# Patient Record
Sex: FEMALE | Race: WHITE | NOT HISPANIC OR LATINO | Employment: FULL TIME | URBAN - METROPOLITAN AREA
[De-identification: names, ages, dates, MRNs, and addresses within clinical notes are randomized per-mention and may not be internally consistent; named-entity substitution may affect disease eponyms.]

---

## 2017-01-10 RX ORDER — ESTRADIOL 0.1 MG/G
CREAM VAGINAL
Qty: 42.5 G | Refills: 0 | Status: ON HOLD | OUTPATIENT
Start: 2017-01-10 | End: 2018-06-11 | Stop reason: HOSPADM

## 2017-02-20 ENCOUNTER — HOSPITAL ENCOUNTER (OUTPATIENT)
Dept: RADIOLOGY | Facility: HOSPITAL | Age: 57
Discharge: HOME | End: 2017-02-20
Attending: NEUROLOGICAL SURGERY
Payer: COMMERCIAL

## 2017-02-20 ENCOUNTER — PROCEDURE VISIT (OUTPATIENT)
Dept: NEUROLOGY | Facility: CLINIC | Age: 57
End: 2017-02-20
Payer: COMMERCIAL

## 2017-02-20 DIAGNOSIS — M40.30 FLAT BACK SYNDROME, POSTPROCEDURAL: ICD-10-CM

## 2017-02-20 DIAGNOSIS — K83.8 COMMON BILE DUCT DILATATION: ICD-10-CM

## 2017-02-20 PROCEDURE — 72082 X-RAY EXAM ENTIRE SPI 2/3 VW: CPT | Mod: 26,,, | Performed by: RADIOLOGY

## 2017-02-20 PROCEDURE — 72131 CT LUMBAR SPINE W/O DYE: CPT | Mod: TC

## 2017-02-20 PROCEDURE — 72114 X-RAY EXAM L-S SPINE BENDING: CPT | Mod: 26,,, | Performed by: RADIOLOGY

## 2017-02-20 PROCEDURE — 72082 X-RAY EXAM ENTIRE SPI 2/3 VW: CPT | Mod: TC

## 2017-02-20 PROCEDURE — 95908 NRV CNDJ TST 3-4 STUDIES: CPT | Mod: S$GLB,,,

## 2017-02-20 PROCEDURE — 73522 X-RAY EXAM HIPS BI 3-4 VIEWS: CPT | Mod: 26,,, | Performed by: RADIOLOGY

## 2017-02-20 PROCEDURE — 76705 ECHO EXAM OF ABDOMEN: CPT | Mod: 26,,, | Performed by: RADIOLOGY

## 2017-02-20 PROCEDURE — 95886 MUSC TEST DONE W/N TEST COMP: CPT | Mod: S$GLB,,,

## 2017-02-20 PROCEDURE — 76705 ECHO EXAM OF ABDOMEN: CPT | Mod: TC

## 2017-02-20 PROCEDURE — 72131 CT LUMBAR SPINE W/O DYE: CPT | Mod: 26,,, | Performed by: RADIOLOGY

## 2017-02-20 PROCEDURE — 72114 X-RAY EXAM L-S SPINE BENDING: CPT | Mod: TC

## 2017-02-20 PROCEDURE — 73522 X-RAY EXAM HIPS BI 3-4 VIEWS: CPT | Mod: TC,LT

## 2017-02-20 NOTE — PROCEDURES
Procedures     See the copy of this report that has been scanned into patient's Epic Chart.     JAVAN STATON III, MD

## 2017-02-21 ENCOUNTER — HOSPITAL ENCOUNTER (OUTPATIENT)
Dept: RADIOLOGY | Facility: HOSPITAL | Age: 57
Discharge: HOME | End: 2017-02-21
Attending: FAMILY MEDICINE
Payer: COMMERCIAL

## 2017-02-21 ENCOUNTER — OFFICE VISIT (OUTPATIENT)
Dept: FAMILY MEDICINE | Facility: CLINIC | Age: 57
End: 2017-02-21
Payer: COMMERCIAL

## 2017-02-21 VITALS
TEMPERATURE: 98 F | HEIGHT: 63 IN | BODY MASS INDEX: 35.66 KG/M2 | RESPIRATION RATE: 12 BRPM | WEIGHT: 201.25 LBS | HEART RATE: 85 BPM | SYSTOLIC BLOOD PRESSURE: 128 MMHG | OXYGEN SATURATION: 99 % | DIASTOLIC BLOOD PRESSURE: 70 MMHG

## 2017-02-21 DIAGNOSIS — R60.0 PEDAL EDEMA: ICD-10-CM

## 2017-02-21 DIAGNOSIS — M25.562 CHRONIC PAIN OF LEFT KNEE: ICD-10-CM

## 2017-02-21 DIAGNOSIS — G89.29 CHRONIC PAIN OF LEFT KNEE: ICD-10-CM

## 2017-02-21 DIAGNOSIS — R29.6 FALLS FREQUENTLY: Primary | ICD-10-CM

## 2017-02-21 PROCEDURE — 73562 X-RAY EXAM OF KNEE 3: CPT | Mod: 26,LT,, | Performed by: RADIOLOGY

## 2017-02-21 PROCEDURE — 99999 PR PBB SHADOW E&M-EST. PATIENT-LVL III: CPT | Mod: PBBFAC,,, | Performed by: FAMILY MEDICINE

## 2017-02-21 PROCEDURE — 99214 OFFICE O/P EST MOD 30 MIN: CPT | Mod: S$GLB,,, | Performed by: FAMILY MEDICINE

## 2017-02-21 PROCEDURE — 73560 X-RAY EXAM OF KNEE 1 OR 2: CPT | Mod: TC,59,PO,RT

## 2017-02-21 PROCEDURE — 73560 X-RAY EXAM OF KNEE 1 OR 2: CPT | Mod: 26,59,RT, | Performed by: RADIOLOGY

## 2017-02-21 RX ORDER — FUROSEMIDE 20 MG/1
20 TABLET ORAL DAILY
Qty: 30 TABLET | Refills: 0 | Status: SHIPPED | OUTPATIENT
Start: 2017-02-21 | End: 2018-09-26

## 2017-02-21 RX ORDER — GABAPENTIN 300 MG/1
CAPSULE ORAL
Refills: 4 | COMMUNITY
Start: 2017-02-08 | End: 2017-11-07 | Stop reason: SDUPTHER

## 2017-02-21 NOTE — MR AVS SNAPSHOT
[facility]  [address]  [place] [address]  Phone: [phone]  Fax: [phone]                  Chanelle Downey   2017 8:20 AM   Office Visit    Description:  Female : 1960   Provider:  Jamari Murguia MD   Department:  [facility]           Reason for Visit     Leg Swelling           Diagnoses this Visit        Comments    Falls frequently    -  Primary     Chronic pain of left knee         Pedal edema                To Do List           Future Appointments        Provider Department Dept Phone    2017 1:00 PM [facility] [phone]      Goals (5 Years of Data)     None       These Medications        Disp Refills Start End    furosemide (LASIX) 20 MG tablet 30 tablet 0 2017    Take 1 tablet (20 mg total) by mouth once daily. - Oral    Pharmacy: [id number] - [place], [place] - [address] AT [facility] Ph #: [phone]         [facility] On Call     [facility] On Call Nurse Care Line -  Assistance  Registered nurses in the [facility] On Call Center provide clinical advisement, health education, appointment booking, and other advisory services.  Call for this free service at [phone].             Medications           Message regarding Medications     Verify the changes and/or additions to your medication regime listed below are the same as discussed with your clinician today.  If any of these changes or additions are incorrect, please notify your healthcare provider.        START taking these NEW medications        Refills    furosemide (LASIX) 20 MG tablet 0    Sig: Take 1 tablet (20 mg total) by mouth once daily.    Class: Normal    Route: Oral           Verify that the below list of medications is an accurate representation of the medications you are currently taking.  If none reported, the list may be blank. If incorrect,  "please contact your healthcare provider. Carry this list with you in case of emergency.           Current Medications     (Magic mouthwash) 1:1:1 Benadryl 12.5mg/5ml liq, aluminum & magnesium hydroxide-simehticone (Maalox), lidocaine viscous 2% Swish and spit 5 mLs every 4 (four) hours as needed. for mouth sores    duloxetine (CYMBALTA) 30 MG capsule Take 3 capsules (90 mg total) by mouth once daily.    ESTRACE 0.01 % (0.1 mg/gram) vaginal cream PLACE 1 GRAM VAGINALLY EVERY DAY    gabapentin (NEURONTIN) 300 MG capsule TK 1 C PO TID    methadone (DOLOPHINE) 5 MG tablet Take 125 mg by mouth once daily.     omeprazole (PRILOSEC) 40 MG capsule TAKE ONE CAPSULE BY MOUTH ONCE DAILY    triamcinolone acetonide 0.1% (KENALOG) 0.1 % ointment Apply topically 2 (two) times daily as needed.    furosemide (LASIX) 20 MG tablet Take 1 tablet (20 mg total) by mouth once daily.           Clinical Reference Information           Your Vitals Were     BP Pulse Temp Resp Height Weight    128/70 (BP Location: Left arm, Patient Position: Sitting, BP Method: Manual) 85 98 °F (36.7 °C) (Oral) 12 5' 3" (1.6 m) 91.3 kg (201 lb 4.5 oz)    SpO2 BMI             99% 35.66 kg/m2         Blood Pressure          Most Recent Value    BP  128/70      Allergies as of 2/21/2017     No Known Allergies      Immunizations Administered on Date of Encounter - 2/21/2017     None      Orders Placed During Today's Visit      Normal Orders This Visit    Ambulatory referral to Orthopedics     CANE FOR HOME USE       Smoking Cessation     If you would like to quit smoking:   You may be eligible for free services if you are a [place] resident and started smoking cigarettes before September 1, 1988.  Call the Smoking Cessation [employer] (SCT) toll free at (107) 686-8814 or (186) 489-0235.   Call 9-865-QUIT-NOW if you do not meet the above criteria.            Language Assistance Services     ATTENTION: Language assistance services are available, free of charge. " Please call [phone].      ATENCIÓN: Si habla español, tiene a means disposición servicios gratuitos de asistencia lingüística. Llame al [phone].     CHÚ Ý: N?u b?n nói Ti?ng Vi?t, có các d?ch v? h? tr? ngôn ng? mi?n phí dành cho b?n. G?i s? [phone].         [facility] complies with applicable Federal civil rights laws and does not discriminate on the basis of race, color, national origin, age, disability, or sex.

## 2017-02-21 NOTE — PROGRESS NOTES
Subjective:       Patient ID: Chanelle Downey is a 57 y.o. female.    Chief Complaint: Leg Swelling (patient also fell yesterday , left knee pain now)    HPI    Fall - pts R ankle gave out yesterday and caused her to fall and hurt her L knee, which is the knee she has chronic pain in and limited ROM. She had her cane at the time of her fall, but it did not stabilize her enough.     Pedal edema - onset one week ago and worsened to today. Worsens when she is up on her feet, at the end of the day, or when she sleeps in her recliner with her feet hanging low.        Current Outpatient Prescriptions on File Prior to Visit   Medication Sig Dispense Refill    (Magic mouthwash) 1:1:1 Benadryl 12.5mg/5ml liq, aluminum & magnesium hydroxide-simehticone (Maalox), lidocaine viscous 2% Swish and spit 5 mLs every 4 (four) hours as needed. for mouth sores 1 Bottle 1    duloxetine (CYMBALTA) 30 MG capsule Take 3 capsules (90 mg total) by mouth once daily. 90 capsule 5    ESTRACE 0.01 % (0.1 mg/gram) vaginal cream PLACE 1 GRAM VAGINALLY EVERY DAY 42.5 g 0    methadone (DOLOPHINE) 5 MG tablet Take 125 mg by mouth once daily.       omeprazole (PRILOSEC) 40 MG capsule TAKE ONE CAPSULE BY MOUTH ONCE DAILY 90 capsule 0    triamcinolone acetonide 0.1% (KENALOG) 0.1 % ointment Apply topically 2 (two) times daily as needed. 30 g 1     No current facility-administered medications on file prior to visit.        Review of Systems   Constitutional: Negative for chills and fever.   Skin: Negative for rash and wound.       Objective:     Vitals:    02/21/17 0836   BP: 128/70   Pulse: 85   Resp: 12   Temp: 98 °F (36.7 °C)        Physical Exam   Constitutional: She appears well-developed. No distress.   HENT:   Head: Normocephalic and atraumatic.   Eyes: Conjunctivae are normal. No scleral icterus.   Pulmonary/Chest: Effort normal.   Musculoskeletal: She exhibits edema and tenderness.   +1 pedal edema up to the knee bilaterally. Peripheral  pulses LLE intact (pt dp).    Neurological: She is alert.   Skin: She is not diaphoretic.   Psychiatric: She has a normal mood and affect. Her behavior is normal.   Vitals reviewed.      Assessment:       1. Falls frequently    2. Chronic pain of left knee    3. Pedal edema        Plan:       Chanelle was seen today for leg swelling.    Diagnoses and all orders for this visit:    Falls frequently  -     CANE FOR HOME USE  Pt was offered a rolling walker for stability which she refused, but she did agree to a quad cane.   Pt has debility from her low back pain, but also her chronic L knee pain    Chronic pain of left knee  -     Ambulatory referral to Orthopedics  -     X-ray Knee Ortho Left; Future  Pt has debility from her low back pain, but also her chronic L knee pain - xray and ortho eval. Pt only had 5 days of relief from steroid shot.      Pedal edema   -     furosemide (LASIX) 20 MG tablet; Take 1 tablet (20 mg total) by mouth once daily.    One time rx to be used in conjunction with moderate strength compression stockings.             Return if symptoms worsen or fail to improve.        Pt verbalized understanding and agreed with our plan.

## 2017-02-22 ENCOUNTER — TELEPHONE (OUTPATIENT)
Dept: FAMILY MEDICINE | Facility: CLINIC | Age: 57
End: 2017-02-22

## 2017-02-22 NOTE — TELEPHONE ENCOUNTER
Patient has an appointment 3/23/17 1pm with Dr. [facility] at the [facility], patient was notified.

## 2017-03-10 DIAGNOSIS — K21.9 GASTROESOPHAGEAL REFLUX DISEASE, ESOPHAGITIS PRESENCE NOT SPECIFIED: ICD-10-CM

## 2017-03-10 RX ORDER — OMEPRAZOLE 40 MG/1
CAPSULE, DELAYED RELEASE ORAL
Qty: 90 CAPSULE | Refills: 3 | Status: SHIPPED | OUTPATIENT
Start: 2017-03-10 | End: 2018-04-22 | Stop reason: SDUPTHER

## 2017-04-21 RX ORDER — ESTRADIOL 0.1 MG/G
CREAM VAGINAL
Qty: 42.5 G | Refills: 0 | Status: ON HOLD | OUTPATIENT
Start: 2017-04-21 | End: 2018-06-11 | Stop reason: HOSPADM

## 2017-11-02 DIAGNOSIS — F32.A CHRONIC DEPRESSION: ICD-10-CM

## 2017-11-02 RX ORDER — DULOXETIN HYDROCHLORIDE 30 MG/1
90 CAPSULE, DELAYED RELEASE ORAL DAILY
Qty: 90 CAPSULE | Refills: 3 | Status: SHIPPED | OUTPATIENT
Start: 2017-11-02 | End: 2018-04-16 | Stop reason: SDUPTHER

## 2017-11-02 NOTE — TELEPHONE ENCOUNTER
----- Message from Ivonne Oropeza sent at 11/2/2017  8:20 AM CDT -----  Contact: Pt  Pt needs a refill on duloxetine (CYMBALTA) 30 MG capsule [facility] at [phone].      Pt is currently out of her medication and she's not scheduled until tomorrow with her psychiatrist.  She's requesting a one day supply.    Please call pt at [phone].        Thanks!

## 2017-11-07 DIAGNOSIS — G62.9 NEUROPATHY: Primary | ICD-10-CM

## 2017-11-07 RX ORDER — GABAPENTIN 300 MG/1
300 CAPSULE ORAL 3 TIMES DAILY PRN
Qty: 270 CAPSULE | Refills: 1 | Status: SHIPPED | OUTPATIENT
Start: 2017-11-07 | End: 2018-09-26

## 2017-11-07 NOTE — TELEPHONE ENCOUNTER
----- Message from Cynthia Graham sent at 11/7/2017  3:49 PM CST -----  Contact: self  Pt needs a refill on her gabapentin. She uses [facility].      Pt can be reached at [phone]

## 2018-01-17 ENCOUNTER — OFFICE VISIT (OUTPATIENT)
Dept: NEUROLOGY | Facility: CLINIC | Age: 58
End: 2018-01-17
Payer: COMMERCIAL

## 2018-01-17 VITALS
BODY MASS INDEX: 39.3 KG/M2 | WEIGHT: 200.19 LBS | HEIGHT: 60 IN | HEART RATE: 90 BPM | DIASTOLIC BLOOD PRESSURE: 83 MMHG | SYSTOLIC BLOOD PRESSURE: 130 MMHG

## 2018-01-17 DIAGNOSIS — M54.12 CERVICAL RADICULOPATHY: ICD-10-CM

## 2018-01-17 DIAGNOSIS — M40.30 FLAT BACK SYNDROME, POSTPROCEDURAL: Primary | ICD-10-CM

## 2018-01-17 PROCEDURE — 99999 PR PBB SHADOW E&M-EST. PATIENT-LVL III: CPT | Mod: PBBFAC,,, | Performed by: STUDENT IN AN ORGANIZED HEALTH CARE EDUCATION/TRAINING PROGRAM

## 2018-01-17 PROCEDURE — 3008F BODY MASS INDEX DOCD: CPT | Mod: S$GLB,,, | Performed by: STUDENT IN AN ORGANIZED HEALTH CARE EDUCATION/TRAINING PROGRAM

## 2018-01-17 PROCEDURE — 99203 OFFICE O/P NEW LOW 30 MIN: CPT | Mod: S$GLB,,, | Performed by: STUDENT IN AN ORGANIZED HEALTH CARE EDUCATION/TRAINING PROGRAM

## 2018-01-17 RX ORDER — IBUPROFEN 800 MG/1
800 TABLET ORAL
COMMUNITY
End: 2023-02-27 | Stop reason: ALTCHOICE

## 2018-01-17 NOTE — ASSESSMENT & PLAN NOTE
-Plan for PT, exercise program  -Referral to Neurosurgery, will ask about any imaging they would like prior to appt  -Continue current pain control regimen--gabapentin, duloxetine, methadone

## 2018-01-17 NOTE — PATIENT INSTRUCTIONS
CMP to check BUN, Cr.  Referral to PT.  Referral to [facility], addended for aquatherapy if possible.  Referral to NSGY, previous seen by Dr. Caballero.  Will message to clarify need for further imaging prior to appt.

## 2018-01-17 NOTE — PROGRESS NOTES
NEUROLOGY OUTPATIENT CLINIC NOTE    Patient Name:  Chanelle Downey  Patient MRN:  [id number]    HPI:  Patient is a 57 y.o. female with PMHx of GERD and chronic LBP s/p lumbar discectomy 2002 and L4-S1 laminectomy and fusion 2010 with post-procedural flat back syndrome who presents to [facility] 1/17/2018 for concern of progression of BLE symptoms and wondering whether she needs surgical intervention yet.  Patient had seen Dr. Caballero 10/2016 and was under the impression that we were affiliated with his clinic due to confusion with Dr. Yap, who checked EMG on this patient 02/20/17.  Patient presents today with progression of BLE weakness and impaired gait over past ~ 10 years with increased falls over past 5 years.  Notes ~ 10 falls within the past year alone. She is unable to stand straight and walks with a cane, requiring a 45 degree front bend at the waist at all times while walking.  Cites numbness/tingling down entire LLE with associated L foot deformity.  Pain in R lower back with radiation to RLE.  Additional joint pain, possibly due to gait abnormalities, in R hip and L knee.  Occasional urge incontinence but no significant bowel/bladder incontinence or saddle anesthesia.  Has noticed some intermittent numbness/tingling in BUE developing over past year or so, R worse than L.  She still has full strength in BUE.  Has tried PT/OT intermittently over past 10+ years, but notes no recent therapy.  Very interested in working on losing weight and trying therapy.  She is also getting in with smoking cessation clinic next week.  Her pain is relatively well-controlled on gabapentin 300 mg qd (works during the night and takes when she gets home), duloxetine 90 mg po qd, methadone 110 mg po qd, and ibuprofen 800 mg po qd.  Occasionally takes up to 1600 mg ibuprofen daily, counseled on risk to stomach lining and kidneys.      ROS:  General:  No fever, no chills, no fatigue, +weight gain (20+lbs over several  years)  HEENT:  No headache, no changes in vision  Respiratory:  No cough, no SOB  Cardiovascular:  No chest pain, + palpitations--saw PCP for this, no issues  GI:  No abdominal pain, no n/v/c/d  :  No dysuria, no change in frequency, +increased urgency, +stress incontinence  Skin:  No rashes, no wounds  Musculoskeletal:  +LBP, +R hip pain, +L knee pain  Hematologic:  No easy bruising or bleeding  Neuro:  No tremors, +BLE weakness, + LLE numbness, +intermittent BUE numbness/tingling  Psych:  No anxiety, + depression--seen by Psych, stable    PMHx:  Patient Active Problem List   Diagnosis    Right arm weakness    Degenerative cervical spinal stenosis    Cigarette nicotine dependence without complication    Chronic low back pain    History of back surgery    Methadone dependence    Cervical stenosis of spinal canal    Cervical radiculopathy    Chronic pain of left knee    HSV (herpes simplex virus) anogenital infection     PSHx:  Past Surgical History:   Procedure Laterality Date    APPENDECTOMY       SECTION      EYE SURGERY      For strabismus    HYSTERECTOMY      LUMBAR DISCECTOMY      Unknown vertebra    LUMBAR FUSION      L4-S1 fusion reported    TONSILLECTOMY       Medications:  Current Outpatient Prescriptions on File Prior to Visit   Medication Sig Dispense Refill    DULoxetine (CYMBALTA) 30 MG capsule Take 3 capsules (90 mg total) by mouth once daily. 90 capsule 3    gabapentin (NEURONTIN) 300 MG capsule Take 1 capsule (300 mg total) by mouth 3 (three) times daily as needed. 270 capsule 1    methadone (DOLOPHINE) 5 MG tablet Take 125 mg by mouth once daily.       omeprazole (PRILOSEC) 40 MG capsule TAKE 1 CAPSULE BY MOUTH EVERY DAY 90 capsule 3    ESTRACE 0.01 % (0.1 mg/gram) vaginal cream PLACE 1 GRAM VAGINALLY EVERY DAY 42.5 g 0    ESTRACE 0.01 % (0.1 mg/gram) vaginal cream PLACE 1 GRAM VAGINALLY EVERY DAY 42.5 g 0    furosemide (LASIX) 20 MG tablet Take 1  tablet (20 mg total) by mouth once daily. 30 tablet 0    triamcinolone acetonide 0.1% (KENALOG) 0.1 % ointment Apply topically 2 (two) times daily as needed. 30 g 1    [DISCONTINUED] (Magic mouthwash) 1:1:1 Benadryl 12.5mg/5ml liq, aluminum & magnesium hydroxide-simehticone (Maalox), lidocaine viscous 2% Swish and spit 5 mLs every 4 (four) hours as needed. for mouth sores 1 Bottle 1     No current facility-administered medications on file prior to visit.      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  Has been with this job long enough that employer is aware of physical disabilities and can accommodate patient somewhat.  Lives with her son who is schizophrenic and helps take care of him.  She is a current smoker ~ 1/2 ppd (now down to less than that most days)--has been trying to quit and has an appt with smoking cessation clinic this coming week.  Denies EtOH and illicit use.  Regular testing at [facility].  Patient has done well with controlling methodone use and has cut down to 110 mg per day.  She is trying to become more active and would like to lose weight with a form of exercise that would be safe for her.      Physical Exam:  /83   Pulse 90   Ht 5' (1.524 m)   Wt 90.8 kg (200 lb 2.8 oz)   BMI 39.09 kg/m²   General:  Well-developed, well-nourished, nad  HEENT:  NCAT, PERRL, EOMI, oropharygneal membranes non-erythematous/without exudate  Neck:  Supple, no palpable lad, normal ROM.  Negative Spurling's test bilaterally.  Respiratory:  Symmetric expansion, no increased wob  CVS:  1+ pitting BLE edema.  Peripheral pulses 2+ and symmetric--radial, dorsalis pedis.  GI:  Abd soft, non-distended  Skin:  No visible rashes or wounds  Psych:  Pleasant, cooperative with exam.  Speech and thought content appropriate.  Neurologic Exam:  Mental Status:  AAOx3.  Converses  easily.  Able to spell 'world' forward and backward.  Recent, remote recall 3/3.  Cranial Nerves:  PERRLA, EOMI.  Visual fields intact on moving fingers in all quadrants bilaterally.  Baseline strabismus, R eye affected.  Facial movement intact and symmetric.  Tongue protrudes midline, palate raises symmetrically.  Trapezius 5/5 bilaterally.  Motor:  Normal muscle bulk and tone.  Strength 5/5 in BUE on shoulder abduction, biceps/triceps,  strength.  Sensory:  Sensation intact to light touch at all extremities.  Vibratory sensation intact and symmetric at BUE, BLE digits--subjective decrease in vibratory sensation of LLE up to upper shin/knee.  Reflexes:  Reflexes 2+--biceps, brachioradialis.  Areflexic L patellar, brisk 2+ R patellar.  No ankle clonus.  Equivocal toe LLE, withdrawal with downgoing toe RLE.    Coordination:  No resting tremor noted, able to hold posture without tremor.  FTN, HTS, RAINA wnl--no ataxia, dysmetria, or dysdiadochokinesia.  HTS with L heel over R shin limited by LLE weakness.  Gait:  Patient walks bent ~ 45 degrees forward at the waist, antalgic gait otherwise where pt swings LLE with stiff RLE.  No imbalance, normal arm swing.    Labs:  Results: CBC:   Lab Results   Component Value Date/Time    WBC 9.5 04/25/2011 08:28 PM    RBC 3.98 (L) 04/25/2011 08:28 PM    HGB 12.9 04/25/2011 08:28 PM    HCT 37.5 04/25/2011 08:28 PM     04/25/2011 08:28 PM    MCV 94.4 04/25/2011 08:28 PM    MCH 32.4 (H) 04/25/2011 08:28 PM    MCHC 34.3 04/25/2011 08:28 PM     CMP:   Lab Results   Component Value Date/Time    GLU 82 03/18/2016 01:35 PM    CALCIUM 9.5 03/18/2016 01:35 PM    ALBUMIN 3.5 03/18/2016 01:35 PM    PROT 6.5 03/10/2016 10:27 AM     03/18/2016 01:35 PM    K 4.0 03/18/2016 01:35 PM    CO2 29 03/18/2016 01:35 PM     03/18/2016 01:35 PM    BUN 10 03/18/2016 01:35 PM    CREATININE 0.8 03/18/2016 01:35 PM    ALKPHOS 82 03/10/2016 10:27 AM    ALT 23 03/10/2016 10:27 AM    AST 27  03/10/2016 10:27 AM    BILITOT 0.3 03/10/2016 10:27 AM     18 CMP PENDING--will recheck BUN, Cr.    Imagin17 CT Lumbar Spine:  Postsurgical changes prior L4-5 laminectomy and posterior spinal fusion of L4-S1. Hardware appears intact and in satisfactory position.  Fusion of the anterior vertebral bodies at L3-4 with resultant focal kyphosis.  Advanced degenerative changes at L2-L3 resulting in moderate spinal canal stenosis.  Mild degenerative changes elsewhere as above.    10/14/16 MRI Lumbar Spine:  1.  Postsurgical changes.  Degenerative changes primarily at L2-L3 with moderate spinal canal stenosis.  2.  Common bile duct dilatation.  Consider further evaluation with abdominal ultrasound.    Additional Diagnotic Testin17 EMG  Summary/Interpretation (per Dr. Yap)--scanned into EMR:  No sensory potentials identified.  Motor potentials are low in amplitude and conduction velocities are slightly slow.  There is chronic denervation in the gastroc muscle.  CONCLUSION:    1.  Chronic S1 radiculopathy  2.  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  Absent sensory potentials may be artifactual however, due to increased soft tissue mass as noted.  3.  Decreased activation of several muscles may be volitional due to pain, or an upper motor neuron lesion.  4.  Paraspinous not sampled because of prior surgery.    ASSESSMENT/PLAN:  Patient is a 57 y.o. female with a PMHx of GERD and chronic LBP s/p lumbar discectomy  and L4-S1 laminectomy and fusion  who presents to [facility] 2018 due to concern for worsening gait, increased LLE weakness, pain in RLE.    Problem List Items Addressed This Visit        Neuro    Cervical radiculopathy    Overview     18--clinic visit, pt describes intermittent BUE numbness/tingling throughout entire arm/hand.  Negative Spurling's test bilaterally.         Current Assessment & Plan     -Plan for PT, exercise program  -Referral to  Neurosurgery, will ask about any imaging they would like prior to appt  -Continue current pain control regimen--gabapentin, duloxetine, methadone            Orthopedic    Flat back syndrome, postprocedural - Primary    Overview     Dx 10/2016 when seen in NSGY clinic.  Presents to Neurology clinic 01/17/18 for worsening gait, LBP, LLE weakness, and RLE neuropathic pain.         Current Assessment & Plan     -Referral to PT to assess gait and work on strength/balance  -Referral to [facility], aquatherapy if possible  -Referral to NSGY, will ask about further imaging they would like prior to seeing patient in clinic  -Continue current gabapentin, duloxetine, methadone.    -Counseled on NSAID overuse, risk of gabapentin toxicity in ENED[place]. Will recheck BUN, Cr.         Relevant Orders    Ambulatory consult to Physical Therapy    Comprehensive metabolic panel    Ambulatory consult to Neurosurgery        Aniya Hamilton MD  Pager:  [phone] [place] [address]  [phone]

## 2018-01-17 NOTE — ASSESSMENT & PLAN NOTE
-Referral to PT to assess gait and work on strength/balance  -[facility], aquatherapy if possible  -Referral to NSGY, will ask about further imaging they would like prior to seeing patient in clinic  -Continue current gabapentin, duloxetine, methadone.    -Counseled on NSAID overuse, risk of gabapentin toxicity in [place]. Will recheck BUN, Cr.

## 2018-01-23 ENCOUNTER — DOCUMENTATION ONLY (OUTPATIENT)
Dept: SMOKING CESSATION | Facility: CLINIC | Age: 58
End: 2018-01-23

## 2018-01-26 ENCOUNTER — OFFICE VISIT (OUTPATIENT)
Dept: FAMILY MEDICINE | Facility: CLINIC | Age: 58
End: 2018-01-26
Payer: COMMERCIAL

## 2018-01-26 VITALS
BODY MASS INDEX: 38.91 KG/M2 | HEIGHT: 60 IN | RESPIRATION RATE: 20 BRPM | WEIGHT: 198.19 LBS | SYSTOLIC BLOOD PRESSURE: 128 MMHG | OXYGEN SATURATION: 95 % | DIASTOLIC BLOOD PRESSURE: 80 MMHG | HEART RATE: 88 BPM | TEMPERATURE: 100 F

## 2018-01-26 DIAGNOSIS — J45.20 MILD INTERMITTENT REACTIVE AIRWAY DISEASE WITHOUT COMPLICATION: Primary | ICD-10-CM

## 2018-01-26 DIAGNOSIS — J06.9 ACUTE UPPER RESPIRATORY INFECTION: ICD-10-CM

## 2018-01-26 DIAGNOSIS — F17.210 CIGARETTE NICOTINE DEPENDENCE WITHOUT COMPLICATION: ICD-10-CM

## 2018-01-26 DIAGNOSIS — Z12.31 ENCOUNTER FOR SCREENING MAMMOGRAM FOR BREAST CANCER: ICD-10-CM

## 2018-01-26 PROCEDURE — 99999 PR PBB SHADOW E&M-EST. PATIENT-LVL III: CPT | Mod: PBBFAC,,, | Performed by: FAMILY MEDICINE

## 2018-01-26 PROCEDURE — 94640 AIRWAY INHALATION TREATMENT: CPT | Mod: S$GLB,,, | Performed by: FAMILY MEDICINE

## 2018-01-26 PROCEDURE — 99215 OFFICE O/P EST HI 40 MIN: CPT | Mod: S$GLB,,, | Performed by: FAMILY MEDICINE

## 2018-01-26 RX ORDER — IPRATROPIUM BROMIDE AND ALBUTEROL SULFATE 2.5; .5 MG/3ML; MG/3ML
3 SOLUTION RESPIRATORY (INHALATION)
Status: COMPLETED | OUTPATIENT
Start: 2018-01-26 | End: 2018-01-26

## 2018-01-26 RX ORDER — ALBUTEROL SULFATE 90 UG/1
2 AEROSOL, METERED RESPIRATORY (INHALATION) EVERY 4 HOURS PRN
Qty: 1 INHALER | Refills: 3 | Status: SHIPPED | OUTPATIENT
Start: 2018-01-26 | End: 2019-11-04

## 2018-01-26 RX ORDER — AZITHROMYCIN 250 MG/1
TABLET, FILM COATED ORAL
Qty: 6 TABLET | Refills: 0 | Status: ON HOLD | OUTPATIENT
Start: 2018-01-26 | End: 2018-06-11 | Stop reason: HOSPADM

## 2018-01-26 RX ADMIN — IPRATROPIUM BROMIDE AND ALBUTEROL SULFATE 3 ML: 2.5; .5 SOLUTION RESPIRATORY (INHALATION) at 11:01

## 2018-01-26 NOTE — PROGRESS NOTES
Subjective:       Patient ID: Chanelle Downey is a 57 y.o. female.    Chief Complaint: Generalized Body Aches (body ache, cough, sore throat, headache ; off and on 1 week worsen past 2 to 3 days )    HPI    Onset 2-3 days ago of chest congestion, cough, body aches, sore throat, fevers/chills that has worsened since the onset.  + SOB and ARELLANO    + sick contacts    Dayquil has helped somewhat.     No n/v    Current Outpatient Prescriptions on File Prior to Visit   Medication Sig Dispense Refill    DULoxetine (CYMBALTA) 30 MG capsule Take 3 capsules (90 mg total) by mouth once daily. 90 capsule 3    gabapentin (NEURONTIN) 300 MG capsule Take 1 capsule (300 mg total) by mouth 3 (three) times daily as needed. 270 capsule 1    ibuprofen (ADVIL,MOTRIN) 200 MG tablet Take 200 mg by mouth every 6 (six) hours as needed for Pain.      methadone (DOLOPHINE) 5 MG tablet Take 110 mg by mouth once daily.       omeprazole (PRILOSEC) 40 MG capsule TAKE 1 CAPSULE BY MOUTH EVERY DAY 90 capsule 3    ESTRACE 0.01 % (0.1 mg/gram) vaginal cream PLACE 1 GRAM VAGINALLY EVERY DAY 42.5 g 0    ESTRACE 0.01 % (0.1 mg/gram) vaginal cream PLACE 1 GRAM VAGINALLY EVERY DAY 42.5 g 0    furosemide (LASIX) 20 MG tablet Take 1 tablet (20 mg total) by mouth once daily. 30 tablet 0    [DISCONTINUED] triamcinolone acetonide 0.1% (KENALOG) 0.1 % ointment Apply topically 2 (two) times daily as needed. 30 g 1     No current facility-administered medications on file prior to visit.        Past Medical History:   Diagnosis Date    Back pain, chronic        Family History   Problem Relation Age of Onset    Cancer Mother      non hodgkins lymphoma    Alcohol abuse Mother     Arthritis Mother     Depression Mother     Hypertension Mother     Heart disease Mother     Mental illness Daughter     Birth defects Daughter         reports that she has quit smoking. Her smoking use included Cigarettes. She smoked 0.50 packs per day. She has never used  smokeless tobacco. She reports that she drinks alcohol. She reports that she does not use drugs.    Review of Systems  see hpi  Objective:     Vitals:    01/26/18 1107   BP: 128/80   Pulse: 88   Resp: 20   Temp: 99.7 °F (37.6 °C)        Physical Exam   Constitutional: She appears well-developed. No distress.   HENT:   Head: Normocephalic and atraumatic.   Eyes: Conjunctivae are normal. Right eye exhibits no discharge. Left eye exhibits no discharge. No scleral icterus.   Cardiovascular: Normal rate and regular rhythm.  Exam reveals no gallop and no friction rub.    No murmur heard.  Pulmonary/Chest: No respiratory distress. She has wheezes (throughout). She has no rales.   Neurological: She is alert.   Skin: She is not diaphoretic.   Psychiatric: She has a normal mood and affect.   Vitals reviewed.      Assessment:       1. Mild intermittent reactive airway disease without complication    2. Acute upper respiratory infection    3. Encounter for screening mammogram for breast cancer    4. Cigarette nicotine dependence without complication        Plan:       Chanelle was seen today for generalized body aches.    Diagnoses and all orders for this visit:    Mild intermittent reactive airway disease without complication  -     azithromycin (ZITHROMAX Z-DANNY) 250 MG tablet; Take 2 pills day 1, then 1 pill day 2-5.  -     albuterol 90 mcg/actuation inhaler; Inhale 2 puffs into the lungs every 4 (four) hours as needed for Wheezing.  -     albuterol-ipratropium 2.5mg-0.5mg/3mL nebulizer solution 3 mL; Take 3 mLs by nebulization one time.  Patient's physical exam suggests reactive airway disease exacerbation such as COPD. This likely was triggered by upper respiratory illness and is complicated by patient's smoking status.  High risk due to exam findings. Pt to report to ed if sxs worsen.     Acute upper respiratory infection  See above    Encounter for screening mammogram for breast cancer  -     Mammo Digital Screening Bilat with  CAD; Future    Cigarette nicotine dependence without complication  Advised smoking cessation - discussed our free clinic, risks of smoking, and common practices used to quit. Discussion - 5 minutes.             Follow-up in about 4 weeks (around 2/23/2018) for f/u RAD.        Pt verbalized understanding and agreed with our plan.

## 2018-01-26 NOTE — PROGRESS NOTES
Colonoscopy  Call when ready to schedule     Mammogram Pending orders ; pt will call when ready to schedule      Influenza Vaccine Decline

## 2018-02-10 NOTE — PROGRESS NOTES
I have reviewed the notes, assessments, and/or procedures performed by Dr. Hamilton, I concur with her documentation of Chanelle Downey.

## 2018-04-16 DIAGNOSIS — F32.A CHRONIC DEPRESSION: ICD-10-CM

## 2018-04-16 RX ORDER — DULOXETIN HYDROCHLORIDE 30 MG/1
CAPSULE, DELAYED RELEASE ORAL
Qty: 90 CAPSULE | Refills: 0 | Status: SHIPPED | OUTPATIENT
Start: 2018-04-16 | End: 2018-06-02 | Stop reason: SDUPTHER

## 2018-04-22 DIAGNOSIS — K21.9 GASTROESOPHAGEAL REFLUX DISEASE, ESOPHAGITIS PRESENCE NOT SPECIFIED: ICD-10-CM

## 2018-04-23 RX ORDER — OMEPRAZOLE 40 MG/1
CAPSULE, DELAYED RELEASE ORAL
Qty: 90 CAPSULE | Refills: 0 | Status: SHIPPED | OUTPATIENT
Start: 2018-04-23 | End: 2018-08-20 | Stop reason: SDUPTHER

## 2018-05-14 ENCOUNTER — OFFICE VISIT (OUTPATIENT)
Dept: NEUROSURGERY | Facility: CLINIC | Age: 58
End: 2018-05-14
Payer: COMMERCIAL

## 2018-05-14 ENCOUNTER — TELEPHONE (OUTPATIENT)
Dept: NEUROSURGERY | Facility: CLINIC | Age: 58
End: 2018-05-14

## 2018-05-14 ENCOUNTER — HOSPITAL ENCOUNTER (OUTPATIENT)
Dept: RADIOLOGY | Facility: HOSPITAL | Age: 58
Discharge: HOME | End: 2018-05-14
Attending: NEUROLOGICAL SURGERY
Payer: COMMERCIAL

## 2018-05-14 VITALS
WEIGHT: 197.19 LBS | DIASTOLIC BLOOD PRESSURE: 67 MMHG | SYSTOLIC BLOOD PRESSURE: 117 MMHG | HEART RATE: 82 BPM | BODY MASS INDEX: 38.51 KG/M2 | TEMPERATURE: 98 F

## 2018-05-14 DIAGNOSIS — M96.1 FAILED BACK SYNDROME OF LUMBAR SPINE: ICD-10-CM

## 2018-05-14 DIAGNOSIS — F17.210 CIGARETTE NICOTINE DEPENDENCE WITHOUT COMPLICATION: ICD-10-CM

## 2018-05-14 DIAGNOSIS — M43.8X9 SAGITTAL PLANE IMBALANCE: Primary | ICD-10-CM

## 2018-05-14 DIAGNOSIS — M40.30 FLAT BACK SYNDROME, POSTPROCEDURAL: ICD-10-CM

## 2018-05-14 DIAGNOSIS — M96.1 FAILED BACK SYNDROME OF LUMBAR SPINE: Primary | ICD-10-CM

## 2018-05-14 DIAGNOSIS — K80.20 CALCULUS OF GALLBLADDER WITHOUT CHOLECYSTITIS WITHOUT OBSTRUCTION: ICD-10-CM

## 2018-05-14 PROCEDURE — 72082 X-RAY EXAM ENTIRE SPI 2/3 VW: CPT | Mod: TC

## 2018-05-14 PROCEDURE — 99215 OFFICE O/P EST HI 40 MIN: CPT | Mod: S$GLB,,, | Performed by: NEUROLOGICAL SURGERY

## 2018-05-14 PROCEDURE — 72158 MRI LUMBAR SPINE W/O & W/DYE: CPT | Mod: TC

## 2018-05-14 PROCEDURE — 25500020 PHARM REV CODE 255: Performed by: NEUROLOGICAL SURGERY

## 2018-05-14 PROCEDURE — 3008F BODY MASS INDEX DOCD: CPT | Mod: CPTII,S$GLB,, | Performed by: NEUROLOGICAL SURGERY

## 2018-05-14 PROCEDURE — 72158 MRI LUMBAR SPINE W/O & W/DYE: CPT | Mod: 26,,, | Performed by: RADIOLOGY

## 2018-05-14 PROCEDURE — 99999 PR PBB SHADOW E&M-EST. PATIENT-LVL III: CPT | Mod: PBBFAC,,, | Performed by: NEUROLOGICAL SURGERY

## 2018-05-14 PROCEDURE — 72114 X-RAY EXAM L-S SPINE BENDING: CPT | Mod: TC

## 2018-05-14 PROCEDURE — A9585 GADOBUTROL INJECTION: HCPCS | Performed by: NEUROLOGICAL SURGERY

## 2018-05-14 PROCEDURE — 72114 X-RAY EXAM L-S SPINE BENDING: CPT | Mod: 26,,, | Performed by: RADIOLOGY

## 2018-05-14 PROCEDURE — 72082 X-RAY EXAM ENTIRE SPI 2/3 VW: CPT | Mod: 26,,, | Performed by: RADIOLOGY

## 2018-05-14 RX ORDER — GADOBUTROL 604.72 MG/ML
10 INJECTION INTRAVENOUS
Status: COMPLETED | OUTPATIENT
Start: 2018-05-14 | End: 2018-05-14

## 2018-05-14 RX ADMIN — GADOBUTROL 10 ML: 604.72 INJECTION INTRAVENOUS at 12:05

## 2018-05-14 NOTE — LETTER
May 14, 2018      Aniya Hamilton MD  [address] [address]           [facility] - Neurosurgery 7th Fl  [address]  [facility] [address]  Phone: [phone]          Patient: Chanelle Downey   MR Number: [id number]   YOB: 1960   Date of Visit: 5/14/2018       Dear Dr. Aniya Hamilton:    Thank you for referring Chanelle Downey to me for evaluation. Attached you will find relevant portions of my assessment and plan of care.    If you have questions, please do not hesitate to call me. I look forward to following Chanelle Downey along with you.    Sincerely,    Eriberto Hudson MD    Enclosure  CC:  No Recipients    If you would like to receive this communication electronically, please contact [email or web address] or [phone] to request more information on [facility] Link access.    For providers and/or their staff who would like to refer a patient to [facility], please contact us through our one-stop-shop provider referral line, [facility], at [phone].    If you feel you have received this communication in error or would no longer like to receive these types of communications, please e-mail [email or web address]

## 2018-05-14 NOTE — PROGRESS NOTES
History & Physical    I, Jeevan Todd, attest that this documentation has been prepared under the direction and in the presence of Eriberto Hudson MD.    05/14/2018    Chief Complaint   Patient presents with    Lumbar Spine Pain (L-Spine)       History of Present Illness:  Chanelle Downey is a 58 y.o. patient with history of back pain and BL LE pain and positive sagittal imbalance . Patient also with history of previous L4-S1 laminectomy and fusion in 2010. Patient presents for follow up evaluation. Patient has been lost to follow up for over 1 year, because she has been anxious about surgery.     Patient states that her back pain and bilateral leg pain have worsened since the last time I saw her. Her main complaint at this point is her difficulty walking. Patient now also reports new right hip pain (that is 9/10 in intensity when standing and ambulating), new sharp shooting pain that extends down to her bilateral feet, left foot clubbing, and increased neck pain. Patient states that she is also now pushing a cart to get around her workplace (patient works as a LPN at [employer]). She also reports increased falls. She continues to deny any bowel or bladder incontinence.     Patient is still smoking (about 20 cigarettes daily). She states that her son who is a heavy smoker has recently moved into her house.     Since our last visit, patient also has had abdominal ultrasound that showed multiple gallstones, for which the patient has not yet had follow up. Patient complains of intermittent abdominal pain.     Interval History, dated 10/17/2016  Chanelle Downey is a 56 y.o. patient with history of chronic back pain and BL LE pain.  Patient presents for presents evaluation of her back pain and her inability to walk straight.  Patient reports her symptoms as inability to stand up straight and constant dull back pain and numbness in her BL LE. Pain is 9/10 most of the time and pain radiates to both of her  "thighs, knees (Left worse than right) and BL legs. Pt denies sharp shooting pain going to her feet. Symptoms reports that after she had her back fusion by Dr. Han at [facility], she did well initially, her BL LE radiculopathy improved, but then she was not able to stand up straight, she can not walk long distances and currently, she can not sleep flat because of pain. She sleeps sitting up on a chair. Symptoms are worst: when ambulating. Alleviating factors identifiable by patient are bending forwards and bending her legs. Exacerbating factors identifiable by patient are standing and laying flat on her back. Treatments so far initiated by patient: chronic pain meds Previous treatments: Lumbar fusion 2010, L4-S1 laminectomy and fusion. Associated signs and symptoms are multiple falls due to her dragging both of her legs. She denies for bladder/bowel accidents. Pt reports that due to her chronic pain, her left foot had started to get "deformed", with a curve on the outside of it. Pt also reports right shoulder pain and arm pain, in a non-dermatomal localization and that is currently NOT present.     Review of patient's allergies indicates:  No Known Allergies    Current Outpatient Prescriptions   Medication Sig Dispense Refill    albuterol 90 mcg/actuation inhaler Inhale 2 puffs into the lungs every 4 (four) hours as needed for Wheezing. 1 Inhaler 3    azithromycin (ZITHROMAX Z-DANNY) 250 MG tablet Take 2 pills day 1, then 1 pill day 2-5. 6 tablet 0    DULoxetine (CYMBALTA) 30 MG capsule take 3 capsules by mouth once daily 90 capsule 0    ESTRACE 0.01 % (0.1 mg/gram) vaginal cream PLACE 1 GRAM VAGINALLY EVERY DAY 42.5 g 0    ESTRACE 0.01 % (0.1 mg/gram) vaginal cream PLACE 1 GRAM VAGINALLY EVERY DAY 42.5 g 0    gabapentin (NEURONTIN) 300 MG capsule Take 1 capsule (300 mg total) by mouth 3 (three) times daily as needed. 270 capsule 1    ibuprofen (ADVIL,MOTRIN) 200 MG tablet Take 200 mg by mouth every 6 (six) hours " as needed for Pain.      methadone (DOLOPHINE) 5 MG tablet Take 110 mg by mouth once daily.       omeprazole (PRILOSEC) 40 MG capsule TAKE 1 CAPSULE BY MOUTH EVERY DAY 90 capsule 0    furosemide (LASIX) 20 MG tablet Take 1 tablet (20 mg total) by mouth once daily. 30 tablet 0     No current facility-administered medications for this visit.        Past Medical History:   Diagnosis Date    Back pain, chronic        Past Surgical History:   Procedure Laterality Date    APPENDECTOMY       SECTION      EYE SURGERY      For strabismus    HYSTERECTOMY      LUMBAR DISCECTOMY      Unknown vertebra    LUMBAR FUSION  2010    L4-S1 fusion reported    TONSILLECTOMY         Family History   Problem Relation Age of Onset    Cancer Mother         non hodgkins lymphoma    Alcohol abuse Mother     Arthritis Mother     Depression Mother     Hypertension Mother     Heart disease Mother     Mental illness Daughter     Birth defects Daughter        Social History   Substance Use Topics    Smoking status: Former Smoker     Packs/day: 0.50     Types: Cigarettes    Smokeless tobacco: Current User    Alcohol use 0.0 oz/week        Review of Systems:  Review of Systems   Constitutional: Negative for activity change.   HENT: Negative for congestion.    Eyes: Negative for discharge.   Respiratory: Negative for apnea.    Cardiovascular: Negative for chest pain.   Gastrointestinal: Positive for abdominal pain. Negative for abdominal distention.   Endocrine: Negative for cold intolerance.   Genitourinary: Negative for difficulty urinating.   Musculoskeletal: Positive for arthralgias (right hip), back pain, gait problem and neck pain.   Neurological: Negative for dizziness, weakness and headaches.        Positive for bilateral leg pain and bilateral foot pain.    Psychiatric/Behavioral: Negative for agitation.       Vital Signs (Most Recent)  Temp: 98 °F (36.7 °C) (18 09)  Pulse: 82 (18)  BP:  117/67 (05/14/18 0916)     89.4 kg (197 lb 3.2 oz)       Physical Exam:  Physical Exam:    Constitutional: She appears well-developed.     Eyes: Pupils are equal, round, and reactive to light. EOM are normal. Right eye exhibits no discharge. Left eye exhibits no discharge.     Abdominal: Soft.     Skin: Skin displays no rash on trunk and no rash on extremities.     Psych/Behavior: She is alert. She is oriented to person, place, and time.     Musculoskeletal:        Neck: There is no tenderness.        Back: Range of motion is full.        Right Upper Extremities: Range of motion is full. Muscle strength is 5/5. Tone is normal.        Left Upper Extremities: Range of motion is full. Muscle strength is 5/5. Tone is normal.       Right Lower Extremities: Range of motion is full. Muscle strength is 5/5. Tone is normal.        Left Lower Extremities: Range of motion is full. Tone is normal.     Neurological:        Coordination: She has a normal Romberg Test and normal tandem walking coordination.        Sensory: There is no sensory deficit in the trunk. There is no sensory deficit in the extremities.        DTRs: DTRs are DTRS NORMAL AND SYMMETRICnormal and symmetric. Tricep reflexes are 2+ on the right side and 2+ on the left side. Bicep reflexes are 2+ on the right side and 2+ on the left side. Brachioradialis reflexes are 2+ on the right side and 2+ on the left side. Patellar reflexes are 2+ on the right side and 2+ on the left side. Achilles reflexes are 2+ on the right side and 2+ on the left side. She displays no Babinski's sign on the right side. She displays no Babinski's sign on the left side.        Cranial nerves: Cranial nerve(s) II, III, IV, V, VI, VII, VIII, IX, X, XI and XII are intact.     No Chance's.   Reflexes are 2+ throughout.   Bilateral lower extremities with 2+ pitting edema.   4/5 left knee extension and iliopsoas.   Left-sided clubbed foot.     Laboratory  CMP: Reviewed    Diagnostic  Results:  CT: Reviewed   CT Lumbar Spine Without Contrast, dated 2/20/2017: Reviewed.   Postsurgical changes prior L4-5 laminectomy and posterior spinal fusion of L4-S1. Hardware appears intact and in satisfactory position.    Fusion of the anterior vertebral bodies at L3-4 with resultant focal kyphosis.    Advanced degenerative changes at L2-L3 resulting in moderate spinal canal stenosis.    Mild degenerative changes elsewhere as above.    EMG w/ Ultrasound, dated 2/20/2017: Reviewed.   CONCLUSION: TECHNICALLY DIFFICULT STUDY DUE TO MASSIVE LEG SWELLING   1 CHRONIC S1 RADICULOPATHY   2 AXONAL POLYNEUROPATHY MAY ALSO BE PRESENT. ABSENT SENSORY POTENTIALS MAY BE ARTEFACTUAL HOWEVER, DUE TO INCREASED SOFT TISSUE MASS AS NOTED ABOVE.   3 DECREASED ACTIVATION OF SEVERAL MUSCLES MAY BE VOLITIONAL DUE TO PAIN, OR AN UPPER MOTOR NEURON LESION.   4 PARASPINOUS NOT SAMPLED BECAUSE OF PRIOR SURGERY.     ASSESSMENT/PLAN:       ICD-10-CM ICD-9-CM   1. Sagittal plane imbalance M43.8X9 737.8   2. Flat back syndrome, postprocedural M40.30 738.5   3. Cigarette nicotine dependence without complication F17.210 305.1   4. Failed back syndrome of lumbar spine M96.1 722.83   5. Calculus of gallbladder without cholecystitis without obstruction K80.20 574.20       PLAN:    1. Flat back syndrome. Loss of sagittal balance (at least 15 mm of SVA). Failed back syndrome. Patient has decided to move forward with surgery after having worsening disability and inability to ambulate around. The patient has developed a left foot deformity as well, which is worse from the last time I had seen her. At this point, she is committed to proceed with surgery. I have explained to her that because of the age of her films, I will repeat a scoliosis series and an MRI of her lumbar spine without contrast, flexion/extension x-rays of the lumbar spine. Patient also had L2-3 spondylolisthesis with dynamic instability. At this point, I think that she will need at  least T10 to ileum fusion, but we will make the final planning depending on the new imaging and how much correction we would need to achieve (patient may need ALIF or PSOs, depending on today's films). I will also include Dr. Pedro Mireles, my spine deformity partner in the surgery, I will schedule a clinic visit with him in the near future.    2. Common bile duct dilation with multiple gallstones. Will refer her to General Surgery for evaluation. If any surgery needed, we would prefer to deal with the abdominal surgery first.     3. I have explained to her that smoking cessation is critical to the success of her surgery.  We will test her for nicotine, prior to the date of her surgery.      4. I spent 35 minutes with the patient, 50% of time in counseling.      5. Patient will need preoperative clearing, given all her comorbidities.     Chanelle was seen today for lumbar spine pain (l-spine).    Diagnoses and all orders for this visit:    Sagittal plane imbalance    Flat back syndrome, postprocedural    Cigarette nicotine dependence without complication    Failed back syndrome of lumbar spine    Calculus of gallbladder without cholecystitis without obstruction        I, Dr. Eriberto Hudson, personally performed the services described in this documentation. All medical record entries made by the scribe were at my direction and in my presence.  I have reviewed the chart and agree that the record reflects my personal performance and is accurate and complete. Eriberto Hudson MD.  9:36 PM 05/14/2018

## 2018-05-20 ENCOUNTER — HOSPITAL ENCOUNTER (EMERGENCY)
Facility: HOSPITAL | Age: 58
Discharge: HOME | End: 2018-05-20
Attending: EMERGENCY MEDICINE
Payer: OTHER MISCELLANEOUS

## 2018-05-20 VITALS
BODY MASS INDEX: 38.68 KG/M2 | OXYGEN SATURATION: 97 % | WEIGHT: 197 LBS | DIASTOLIC BLOOD PRESSURE: 62 MMHG | RESPIRATION RATE: 16 BRPM | HEIGHT: 60 IN | SYSTOLIC BLOOD PRESSURE: 122 MMHG | TEMPERATURE: 98 F | HEART RATE: 78 BPM

## 2018-05-20 DIAGNOSIS — S01.512A LACERATION OF INTERNAL MOUTH, INITIAL ENCOUNTER: ICD-10-CM

## 2018-05-20 DIAGNOSIS — S00.81XA FACIAL ABRASION, INITIAL ENCOUNTER: Primary | ICD-10-CM
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
## 2018-05-20 PROCEDURE — 25000003 PHARM REV CODE 250: Performed by: EMERGENCY MEDICINE

## 2018-05-20 PROCEDURE — 90471 IMMUNIZATION ADMIN: CPT | Performed by: EMERGENCY MEDICINE

## 2018-05-20 PROCEDURE — 99283 EMERGENCY DEPT VISIT LOW MDM: CPT

## 2018-05-20 PROCEDURE — 90715 TDAP VACCINE 7 YRS/> IM: CPT | Performed by: EMERGENCY MEDICINE

## 2018-05-20 PROCEDURE — 63600175 PHARM REV CODE 636 W HCPCS: Performed by: EMERGENCY MEDICINE

## 2018-05-20 RX ADMIN — BACITRACIN, NEOMYCIN, POLYMYXIN B 1 EACH: 400; 3.5; 5 OINTMENT TOPICAL at 06:05

## 2018-05-20 RX ADMIN — CLOSTRIDIUM TETANI TOXOID ANTIGEN (FORMALDEHYDE INACTIVATED), CORYNEBACTERIUM DIPHTHERIAE TOXOID ANTIGEN (FORMALDEHYDE INACTIVATED), BORDETELLA PERTUSSIS TOXOID ANTIGEN (GLUTARALDEHYDE INACTIVATED), BORDETELLA PERTUSSIS FILAMENTOUS HEMAGGLUTININ ANTIGEN (FORMALDEHYDE INACTIVATED), BORDETELLA PERTUSSIS PERTACTIN ANTIGEN, AND BORDETELLA PERTUSSIS FIMBRIAE 2/3 ANTIGEN 0.5 ML: 5; 2; 2.5; 5; 3; 5 INJECTION, SUSPENSION INTRAMUSCULAR at 06:05

## 2018-05-20 NOTE — ED TRIAGE NOTES
Pt reports she trip and fell outside of work around 4am. Pt reports hx of falls due chronic back pain, report had spinal fusion a few years ago. Pt report she hit left side of head/face/neck, presents with abrasions to forehead, nose, left cheek bone, and cut lip. Denies LOC. Denies pain in extremities or other areas of body.

## 2018-05-20 NOTE — ED PROVIDER NOTES
"Encounter Date: 2018    SCRIBE #1 NOTE: I, Karishma Schaeffer, am scribing for, and in the presence of,  Irvin Murillo MD. I have scribed the following portions of the note - Other sections scribed: HPI and ROS.       History     Chief Complaint   Patient presents with    Fall     pt reports that she was walking outside and had a fall due to gait problems about 1hr PTA; pt reports that she landed on cement and hit the front of her head; pt c/o minor pain to left side of face/head/neck; pt denies LOC, denies dizziness, and states "I feel ok"; fall occured at pt's work and was recommended to come get checked out    Facial Pain     CC: Fall    HPI: This 58 y.o. Female with PMHx of back pain and Depression presents to the ED for an evaluation following a fall that occurred while pt was working approximately 1 hour PTA. Pt reports head trauma. No LOC. Pt reports abrasions to face and left knee and sore neck.  Pt reports frequent falls due to an imbalance problem, and she is being treated for it by her neurosurgeon. Pt is not on blood thinners. Tetanus shot is not up to date. Pt denies knee pain, hip pain, memory loss, visual disturbance, and any other associated symptoms.         The history is provided by the patient. No  was used.     Review of patient's allergies indicates:  No Known Allergies  Past Medical History:   Diagnosis Date    Back pain, chronic     Depression      Past Surgical History:   Procedure Laterality Date    APPENDECTOMY       SECTION      EYE SURGERY      For strabismus    HYSTERECTOMY      LUMBAR DISCECTOMY  2002    Unknown vertebra    LUMBAR FUSION  2010    L4-S1 fusion reported    TONSILLECTOMY       Family History   Problem Relation Age of Onset    Cancer Mother         non hodgkins lymphoma    Alcohol abuse Mother     Arthritis Mother     Depression Mother     Hypertension Mother     Heart disease Mother     Mental illness Daughter     " Birth defects Daughter      Social History   Substance Use Topics    Smoking status: Former Smoker     Packs/day: 0.50     Types: Cigarettes    Smokeless tobacco: Current User    Alcohol use 0.0 oz/week     Review of Systems   Constitutional: Negative for chills, diaphoresis and fever.   HENT: Negative for ear pain and sore throat.         (+) Head trauma   Eyes: Negative for pain and visual disturbance.   Respiratory: Negative for cough and shortness of breath.    Cardiovascular: Negative for chest pain.   Gastrointestinal: Negative for abdominal pain, diarrhea, nausea and vomiting.   Genitourinary: Negative for dysuria.   Musculoskeletal: Positive for neck pain (sore). Negative for arthralgias (knee pain, hip pain) and back pain.   Skin: Positive for wound (abrasions to face and left knee). Negative for rash.   Neurological: Negative for headaches.        No LOC. (-) Memory loss       Physical Exam     Initial Vitals [05/20/18 0537]   BP Pulse Resp Temp SpO2   131/68 75 16 97.8 °F (36.6 °C) 99 %      MAP       89         Physical Exam    Nursing note and vitals reviewed.  Constitutional: She appears well-developed and well-nourished.   HENT:   Right Ear: External ear normal.   Left Ear: External ear normal.   Nose: Nose normal.   Mouth/Throat: Oropharynx is clear and moist. No oropharyngeal exudate.   Small nonbleeding abrasion to left side of face. Small nonopen left lower inner lip laceration. No bony tpp to face. Normal dentition and bite.    Eyes: EOM are normal. Pupils are equal, round, and reactive to light.   Neck: Normal range of motion. Neck supple. No thyromegaly present. No JVD present.   No ttp. FROm without pain.    Cardiovascular: Normal rate, regular rhythm, normal heart sounds and intact distal pulses. Exam reveals no gallop and no friction rub.    No murmur heard.  Pulmonary/Chest: Breath sounds normal. No respiratory distress. She has no wheezes. She has no rhonchi. She has no rales. She  exhibits no tenderness.   Abdominal: Soft. Bowel sounds are normal. She exhibits no distension and no mass. There is no tenderness. There is no rebound and no guarding.   Musculoskeletal: Normal range of motion. She exhibits no edema or tenderness.   Neurological: She is alert and oriented to person, place, and time. She has normal strength and normal reflexes. She displays normal reflexes. No cranial nerve deficit or sensory deficit.   Skin: Skin is warm and dry. Capillary refill takes less than 2 seconds.   See HEENT- also partial non bleeding abrasion to right knee.         ED Course   Procedures  Labs Reviewed - No data to display     No CT head indicated. Patient agrees. No evidence intracranial injury, facial fracture remained fracture, neck injury, back injury. Chest injury. Abdominal injury. Extremity injury. Patient is stable for discharge. Td updated, wound cleaned and dressed. Patient states 0/10 pain.                 Scribe Attestation:   Scribe #1: I performed the above scribed service and the documentation accurately describes the services I performed. I attest to the accuracy of the note.    Attending Attestation:           Physician Attestation for Scribe:  Physician Attestation Statement for Scribe #1: I, Irvin Murillo MD, reviewed documentation, as scribed by Karishma Schaeffer in my presence, and it is both accurate and complete.                    Clinical Impression:   The primary encounter diagnosis was Facial abrasion, initial encounter. Diagnoses of Fall, initial encounter and Laceration of internal mouth, initial encounter were also pertinent to this visit.                           Irvin Murillo MD  05/20/18 7506

## 2018-05-31 ENCOUNTER — OFFICE VISIT (OUTPATIENT)
Dept: SURGERY | Facility: CLINIC | Age: 58
End: 2018-05-31
Payer: COMMERCIAL

## 2018-05-31 VITALS
HEIGHT: 60 IN | DIASTOLIC BLOOD PRESSURE: 70 MMHG | TEMPERATURE: 99 F | SYSTOLIC BLOOD PRESSURE: 136 MMHG | WEIGHT: 197 LBS | HEART RATE: 87 BPM | BODY MASS INDEX: 38.68 KG/M2

## 2018-05-31 DIAGNOSIS — K80.20 GALLBLADDER CALCULUS WITHOUT CHOLECYSTITIS AND NO OBSTRUCTION: Primary | ICD-10-CM

## 2018-05-31 PROCEDURE — 3008F BODY MASS INDEX DOCD: CPT | Mod: CPTII,S$GLB,, | Performed by: SURGERY

## 2018-05-31 PROCEDURE — 99999 PR PBB SHADOW E&M-EST. PATIENT-LVL III: CPT | Mod: PBBFAC,,, | Performed by: SURGERY

## 2018-05-31 PROCEDURE — 99204 OFFICE O/P NEW MOD 45 MIN: CPT | Mod: S$GLB,,, | Performed by: SURGERY

## 2018-05-31 NOTE — PROGRESS NOTES
History & Physical    SUBJECTIVE:     History of Present Illness:  Chanelle Downey is a 58 y.o. female with h/o back pain and multiple surgeries who presents for evaluation of biliary ductal dilation noted on an abdominal US over a year ago. Patient reports long history of intermittent abdominal cramping. Unknown if associated with eating. Attacks start acutely and improve over the next several hours. She describes upper abdominal pain, sharp. Abdominal US on 2017 showed cholelithiasis with CBD measuring 8mm. LFTs in 2018 were wnl. Some nausea, no vomiting, no fever/chills, diarrhea, or blood in stool.   1 pack per day smoker.   PSH includes appendectomy,  x2, hysterectomy.    Chief Complaint   Patient presents with    Consult       Review of patient's allergies indicates:  No Known Allergies    Current Outpatient Prescriptions   Medication Sig Dispense Refill    albuterol 90 mcg/actuation inhaler Inhale 2 puffs into the lungs every 4 (four) hours as needed for Wheezing. 1 Inhaler 3    azithromycin (ZITHROMAX Z-DANNY) 250 MG tablet Take 2 pills day 1, then 1 pill day 2-5. 6 tablet 0    DULoxetine (CYMBALTA) 30 MG capsule take 3 capsules by mouth once daily 90 capsule 0    ESTRACE 0.01 % (0.1 mg/gram) vaginal cream PLACE 1 GRAM VAGINALLY EVERY DAY 42.5 g 0    ESTRACE 0.01 % (0.1 mg/gram) vaginal cream PLACE 1 GRAM VAGINALLY EVERY DAY 42.5 g 0    gabapentin (NEURONTIN) 300 MG capsule Take 1 capsule (300 mg total) by mouth 3 (three) times daily as needed. 270 capsule 1    ibuprofen (ADVIL,MOTRIN) 200 MG tablet Take 200 mg by mouth every 6 (six) hours as needed for Pain.      methadone (DOLOPHINE) 5 MG tablet Take 110 mg by mouth once daily.       omeprazole (PRILOSEC) 40 MG capsule TAKE 1 CAPSULE BY MOUTH EVERY DAY 90 capsule 0    furosemide (LASIX) 20 MG tablet Take 1 tablet (20 mg total) by mouth once daily. 30 tablet 0     No current facility-administered medications for this visit.         Past Medical History:   Diagnosis Date    Back pain, chronic     Depression      Past Surgical History:   Procedure Laterality Date    APPENDECTOMY       SECTION      EYE SURGERY      For strabismus    HYSTERECTOMY      LUMBAR DISCECTOMY  2002    Unknown vertebra    LUMBAR FUSION  2010    L4-S1 fusion reported    TONSILLECTOMY       Family History   Problem Relation Age of Onset    Cancer Mother         non hodgkins lymphoma    Alcohol abuse Mother     Arthritis Mother     Depression Mother     Hypertension Mother     Heart disease Mother     Mental illness Daughter     Birth defects Daughter      Social History   Substance Use Topics    Smoking status: Current Every Day Smoker     Packs/day: 0.50     Types: Cigarettes    Smokeless tobacco: Current User    Alcohol use 0.0 oz/week        Review of Systems:  Review of Systems   Constitutional: Negative for chills and fever.   Respiratory: Negative for cough and shortness of breath.    Cardiovascular: Negative for chest pain and palpitations.   Gastrointestinal: Negative for abdominal distention, abdominal pain, constipation, diarrhea and nausea.   Musculoskeletal: Positive for back pain. Negative for myalgias.   Neurological: Negative for dizziness and headaches.   Psychiatric/Behavioral: Negative for agitation and confusion.       OBJECTIVE:     Vital Signs (Most Recent)  Temp: 98.7 °F (37.1 °C) (18)  Pulse: 87 (18)  BP: 136/70 (18)  5' (1.524 m)  89.4 kg (196 lb 15.7 oz)     Physical Exam:  Physical Exam   Constitutional: She is oriented to person, place, and time. She appears well-developed and well-nourished. No distress.   Cardiovascular: Normal rate and regular rhythm.    Pulmonary/Chest: Effort normal. No respiratory distress.   Abdominal: Soft. She exhibits no distension and no mass. There is no tenderness. There is no rebound and no guarding. No hernia.   Well healed lower midline incision    Neurological: She is alert and oriented to person, place, and time.   Skin: Skin is warm and dry.   Psychiatric: She has a normal mood and affect. Her behavior is normal.       ASSESSMENT/PLAN:     Chanelle Downey is a 58 y.o. female with cholelithiasis and symptoms consistent with biliary colic.     PLAN:  LFTs prior to OR  Plan for lap ruchi  Risks and benefits explained, questions answered, consent obtained.     Amber Godoy MD, PGY-2  General Surgery  [phone]         I have personally performed a detailed history and physical examination on this patient. My findings are summarized in the resident's note included in the record.

## 2018-05-31 NOTE — LETTER
May 31, 2018      Eriberto Hudson MD  [address] [facility] [address]           [facility]  [address]  [place] [address]  Phone: [phone]          Patient: Chanelle Downey   MR Number: [id number]   YOB: 1960   Date of Visit: 5/31/2018       Dear Dr. Eriberto Hudson:    Thank you for referring Chanelle Downey to me for evaluation. Attached you will find relevant portions of my assessment and plan of care.    If you have questions, please do not hesitate to call me. I look forward to following Chanelle Downey along with you.    Sincerely,    Yonatan Berry MD    Enclosure  CC:  No Recipients    If you would like to receive this communication electronically, please contact [email or web address] or [phone] to request more information on [employer] access.    For providers and/or their staff who would like to refer a patient to [facility], please contact us through our one-stop-shop provider referral line, [facility] , at [phone].    If you feel you have received this communication in error or would no longer like to receive these types of communications, please e-mail [email or web address]

## 2018-06-01 DIAGNOSIS — K80.20 CALCULUS OF GALLBLADDER WITHOUT CHOLECYSTITIS WITHOUT OBSTRUCTION: Primary | ICD-10-CM

## 2018-06-02 DIAGNOSIS — F32.A CHRONIC DEPRESSION: ICD-10-CM

## 2018-06-04 DIAGNOSIS — K80.50 BILIARY COLIC: ICD-10-CM

## 2018-06-04 RX ORDER — SODIUM CHLORIDE 9 MG/ML
INJECTION, SOLUTION INTRAVENOUS CONTINUOUS
Status: CANCELLED | OUTPATIENT
Start: 2018-06-04

## 2018-06-04 RX ORDER — DULOXETIN HYDROCHLORIDE 30 MG/1
CAPSULE, DELAYED RELEASE ORAL
Qty: 90 CAPSULE | Refills: 0 | Status: SHIPPED | OUTPATIENT
Start: 2018-06-04 | End: 2018-07-22 | Stop reason: SDUPTHER

## 2018-06-08 ENCOUNTER — TELEPHONE (OUTPATIENT)
Dept: SURGERY | Facility: CLINIC | Age: 58
End: 2018-06-08

## 2018-06-08 NOTE — TELEPHONE ENCOUNTER
Call to pt regarding surgery arrival time on Monday 6/11/18. Pt did not answer. Left message on voice mail to call back.

## 2018-06-08 NOTE — PRE-PROCEDURE INSTRUCTIONS
PreOp Instructions given:     - Verbal medication information (what to hold and what to take)   - NPO guidelines   - Arrival place directions given; time to be given the day before procedure by the   Surgeon's Office   - Bathing with antibacterial soap   - Don't wear any jewelry or bring any valuables AM of surgery   - No makeup or moisturizer to face   - No perfume/cologne, powder, lotions or aftershave     Pt. verbalized understanding.     Denies any history of side effects or issues with anesthesia or sedation.

## 2018-06-11 ENCOUNTER — SURGERY (OUTPATIENT)
Age: 58
End: 2018-06-11

## 2018-06-11 ENCOUNTER — ANESTHESIA (OUTPATIENT)
Dept: SURGERY | Facility: HOSPITAL | Age: 58
End: 2018-06-11
Payer: COMMERCIAL

## 2018-06-11 ENCOUNTER — HOSPITAL ENCOUNTER (OUTPATIENT)
Facility: HOSPITAL | Age: 58
Discharge: HOME | End: 2018-06-11
Attending: SURGERY | Admitting: SURGERY
Payer: COMMERCIAL

## 2018-06-11 ENCOUNTER — ANESTHESIA EVENT (OUTPATIENT)
Dept: SURGERY | Facility: HOSPITAL | Age: 58
End: 2018-06-11
Payer: COMMERCIAL

## 2018-06-11 VITALS
HEART RATE: 95 BPM | HEIGHT: 60 IN | TEMPERATURE: 98 F | DIASTOLIC BLOOD PRESSURE: 68 MMHG | OXYGEN SATURATION: 96 % | WEIGHT: 190 LBS | RESPIRATION RATE: 16 BRPM | BODY MASS INDEX: 37.3 KG/M2 | SYSTOLIC BLOOD PRESSURE: 141 MMHG

## 2018-06-11 DIAGNOSIS — K80.50 BILIARY COLIC: ICD-10-CM

## 2018-06-11 PROCEDURE — 47562 LAPAROSCOPIC CHOLECYSTECTOMY: CPT | Mod: ,,, | Performed by: SURGERY

## 2018-06-11 PROCEDURE — 36000708 HC OR TIME LEV III 1ST 15 MIN: Performed by: SURGERY

## 2018-06-11 PROCEDURE — 63600175 PHARM REV CODE 636 W HCPCS: Performed by: NURSE ANESTHETIST, CERTIFIED REGISTERED

## 2018-06-11 PROCEDURE — 71000015 HC POSTOP RECOV 1ST HR: Performed by: SURGERY

## 2018-06-11 PROCEDURE — 94761 N-INVAS EAR/PLS OXIMETRY MLT: CPT

## 2018-06-11 PROCEDURE — 63600175 PHARM REV CODE 636 W HCPCS: Performed by: ANESTHESIOLOGY

## 2018-06-11 PROCEDURE — 25000003 PHARM REV CODE 250: Performed by: PHYSICIAN ASSISTANT

## 2018-06-11 PROCEDURE — 25000003 PHARM REV CODE 250: Performed by: NURSE ANESTHETIST, CERTIFIED REGISTERED

## 2018-06-11 PROCEDURE — 71000033 HC RECOVERY, INTIAL HOUR: Performed by: SURGERY

## 2018-06-11 PROCEDURE — 27201423 OPTIME MED/SURG SUP & DEVICES STERILE SUPPLY: Performed by: SURGERY

## 2018-06-11 PROCEDURE — 63600175 PHARM REV CODE 636 W HCPCS: Performed by: PHYSICIAN ASSISTANT

## 2018-06-11 PROCEDURE — 88304 TISSUE EXAM BY PATHOLOGIST: CPT | Mod: 26,,, | Performed by: PATHOLOGY

## 2018-06-11 PROCEDURE — 37000008 HC ANESTHESIA 1ST 15 MINUTES: Performed by: SURGERY

## 2018-06-11 PROCEDURE — 37000009 HC ANESTHESIA EA ADD 15 MINS: Performed by: SURGERY

## 2018-06-11 PROCEDURE — D9220A PRA ANESTHESIA: Mod: CRNA,,, | Performed by: NURSE ANESTHETIST, CERTIFIED REGISTERED

## 2018-06-11 PROCEDURE — 71000039 HC RECOVERY, EACH ADD'L HOUR: Performed by: SURGERY

## 2018-06-11 PROCEDURE — D9220A PRA ANESTHESIA: Mod: ANES,,, | Performed by: ANESTHESIOLOGY

## 2018-06-11 PROCEDURE — 36000709 HC OR TIME LEV III EA ADD 15 MIN: Performed by: SURGERY

## 2018-06-11 PROCEDURE — 27000221 HC OXYGEN, UP TO 24 HOURS

## 2018-06-11 PROCEDURE — 88304 TISSUE EXAM BY PATHOLOGIST: CPT | Performed by: PATHOLOGY

## 2018-06-11 RX ORDER — METOCLOPRAMIDE HYDROCHLORIDE 5 MG/ML
10 INJECTION INTRAMUSCULAR; INTRAVENOUS EVERY 10 MIN PRN
Status: DISCONTINUED | OUTPATIENT
Start: 2018-06-11 | End: 2018-06-11 | Stop reason: HOSPADM

## 2018-06-11 RX ORDER — GLYCOPYRROLATE 0.2 MG/ML
INJECTION INTRAMUSCULAR; INTRAVENOUS
Status: DISCONTINUED | OUTPATIENT
Start: 2018-06-11 | End: 2018-06-14

## 2018-06-11 RX ORDER — FENTANYL CITRATE 50 UG/ML
INJECTION, SOLUTION INTRAMUSCULAR; INTRAVENOUS
Status: DISCONTINUED | OUTPATIENT
Start: 2018-06-11 | End: 2018-06-14

## 2018-06-11 RX ORDER — ONDANSETRON 2 MG/ML
4 INJECTION INTRAMUSCULAR; INTRAVENOUS ONCE AS NEEDED
Status: DISCONTINUED | OUTPATIENT
Start: 2018-06-11 | End: 2018-06-11 | Stop reason: HOSPADM

## 2018-06-11 RX ORDER — NEOSTIGMINE METHYLSULFATE 1 MG/ML
INJECTION, SOLUTION INTRAVENOUS
Status: DISCONTINUED | OUTPATIENT
Start: 2018-06-11 | End: 2018-06-14

## 2018-06-11 RX ORDER — LIDOCAINE HCL/PF 100 MG/5ML
SYRINGE (ML) INTRAVENOUS
Status: DISCONTINUED | OUTPATIENT
Start: 2018-06-11 | End: 2018-06-14

## 2018-06-11 RX ORDER — IBUPROFEN 800 MG/1
800 TABLET ORAL 3 TIMES DAILY
Qty: 15 TABLET | Refills: 0 | Status: SHIPPED | OUTPATIENT
Start: 2018-06-11 | End: 2018-06-16

## 2018-06-11 RX ORDER — PROCHLORPERAZINE EDISYLATE 5 MG/ML
5 INJECTION INTRAMUSCULAR; INTRAVENOUS EVERY 6 HOURS PRN
Status: DISCONTINUED | OUTPATIENT
Start: 2018-06-11 | End: 2018-06-11 | Stop reason: HOSPADM

## 2018-06-11 RX ORDER — ACETAMINOPHEN 10 MG/ML
INJECTION, SOLUTION INTRAVENOUS
Status: DISCONTINUED | OUTPATIENT
Start: 2018-06-11 | End: 2018-06-14

## 2018-06-11 RX ORDER — ONDANSETRON 2 MG/ML
INJECTION INTRAMUSCULAR; INTRAVENOUS
Status: DISCONTINUED | OUTPATIENT
Start: 2018-06-11 | End: 2018-06-14

## 2018-06-11 RX ORDER — SODIUM CHLORIDE 9 MG/ML
INJECTION, SOLUTION INTRAVENOUS CONTINUOUS
Status: DISCONTINUED | OUTPATIENT
Start: 2018-06-11 | End: 2018-06-11 | Stop reason: HOSPADM

## 2018-06-11 RX ORDER — DEXAMETHASONE SODIUM PHOSPHATE 4 MG/ML
INJECTION, SOLUTION INTRA-ARTICULAR; INTRALESIONAL; INTRAMUSCULAR; INTRAVENOUS; SOFT TISSUE
Status: DISCONTINUED | OUTPATIENT
Start: 2018-06-11 | End: 2018-06-14

## 2018-06-11 RX ORDER — PROPOFOL 10 MG/ML
VIAL (ML) INTRAVENOUS
Status: DISCONTINUED | OUTPATIENT
Start: 2018-06-11 | End: 2018-06-14

## 2018-06-11 RX ORDER — KETOROLAC TROMETHAMINE 30 MG/ML
INJECTION, SOLUTION INTRAMUSCULAR; INTRAVENOUS
Status: DISCONTINUED | OUTPATIENT
Start: 2018-06-11 | End: 2018-06-14

## 2018-06-11 RX ORDER — MIDAZOLAM HYDROCHLORIDE 1 MG/ML
INJECTION, SOLUTION INTRAMUSCULAR; INTRAVENOUS
Status: DISCONTINUED | OUTPATIENT
Start: 2018-06-11 | End: 2018-06-14

## 2018-06-11 RX ORDER — FENTANYL CITRATE 50 UG/ML
25 INJECTION, SOLUTION INTRAMUSCULAR; INTRAVENOUS EVERY 5 MIN PRN
Status: DISCONTINUED | OUTPATIENT
Start: 2018-06-11 | End: 2018-06-11

## 2018-06-11 RX ORDER — HYDROMORPHONE HYDROCHLORIDE 1 MG/ML
0.2 INJECTION, SOLUTION INTRAMUSCULAR; INTRAVENOUS; SUBCUTANEOUS EVERY 5 MIN PRN
Status: DISCONTINUED | OUTPATIENT
Start: 2018-06-11 | End: 2018-06-11 | Stop reason: HOSPADM

## 2018-06-11 RX ORDER — SODIUM CHLORIDE 0.9 % (FLUSH) 0.9 %
3 SYRINGE (ML) INJECTION
Status: DISCONTINUED | OUTPATIENT
Start: 2018-06-11 | End: 2018-06-11 | Stop reason: HOSPADM

## 2018-06-11 RX ORDER — FENTANYL CITRATE 50 UG/ML
25 INJECTION, SOLUTION INTRAMUSCULAR; INTRAVENOUS EVERY 5 MIN PRN
Status: COMPLETED | OUTPATIENT
Start: 2018-06-11 | End: 2018-06-11

## 2018-06-11 RX ORDER — ROCURONIUM BROMIDE 10 MG/ML
INJECTION, SOLUTION INTRAVENOUS
Status: DISCONTINUED | OUTPATIENT
Start: 2018-06-11 | End: 2018-06-14

## 2018-06-11 RX ORDER — CEFAZOLIN SODIUM 1 G/3ML
2 INJECTION, POWDER, FOR SOLUTION INTRAMUSCULAR; INTRAVENOUS
Status: COMPLETED | OUTPATIENT
Start: 2018-06-11 | End: 2018-06-11

## 2018-06-11 RX ORDER — KETAMINE HYDROCHLORIDE 10 MG/ML
INJECTION, SOLUTION INTRAMUSCULAR; INTRAVENOUS
Status: DISCONTINUED | OUTPATIENT
Start: 2018-06-11 | End: 2018-06-14

## 2018-06-11 RX ADMIN — LIDOCAINE HYDROCHLORIDE 100 MG: 20 INJECTION, SOLUTION INTRAVENOUS at 09:06

## 2018-06-11 RX ADMIN — CEFAZOLIN 2 G: 330 INJECTION, POWDER, FOR SOLUTION INTRAMUSCULAR; INTRAVENOUS at 09:06

## 2018-06-11 RX ADMIN — FENTANYL CITRATE 50 MCG: 50 INJECTION, SOLUTION INTRAMUSCULAR; INTRAVENOUS at 10:06

## 2018-06-11 RX ADMIN — FENTANYL CITRATE 25 MCG: 50 INJECTION, SOLUTION INTRAMUSCULAR; INTRAVENOUS at 11:06

## 2018-06-11 RX ADMIN — KETAMINE HYDROCHLORIDE 20 MG: 10 INJECTION, SOLUTION INTRAMUSCULAR; INTRAVENOUS at 09:06

## 2018-06-11 RX ADMIN — MIDAZOLAM HYDROCHLORIDE 2 MG: 1 INJECTION, SOLUTION INTRAMUSCULAR; INTRAVENOUS at 09:06

## 2018-06-11 RX ADMIN — ROCURONIUM BROMIDE 40 MG: 10 INJECTION, SOLUTION INTRAVENOUS at 09:06

## 2018-06-11 RX ADMIN — ONDANSETRON 4 MG: 2 INJECTION INTRAMUSCULAR; INTRAVENOUS at 09:06

## 2018-06-11 RX ADMIN — PROPOFOL 150 MG: 10 INJECTION, EMULSION INTRAVENOUS at 09:06

## 2018-06-11 RX ADMIN — DEXAMETHASONE SODIUM PHOSPHATE 4 MG: 4 INJECTION, SOLUTION INTRAMUSCULAR; INTRAVENOUS at 09:06

## 2018-06-11 RX ADMIN — KETOROLAC TROMETHAMINE 30 MG: 30 INJECTION, SOLUTION INTRAMUSCULAR; INTRAVENOUS at 10:06

## 2018-06-11 RX ADMIN — NEOSTIGMINE METHYLSULFATE 5 MG: 1 INJECTION INTRAVENOUS at 10:06

## 2018-06-11 RX ADMIN — FENTANYL CITRATE 25 MCG: 50 INJECTION, SOLUTION INTRAMUSCULAR; INTRAVENOUS at 12:06

## 2018-06-11 RX ADMIN — FENTANYL CITRATE 25 MCG: 50 INJECTION, SOLUTION INTRAMUSCULAR; INTRAVENOUS at 10:06

## 2018-06-11 RX ADMIN — FENTANYL CITRATE 100 MCG: 50 INJECTION, SOLUTION INTRAMUSCULAR; INTRAVENOUS at 09:06

## 2018-06-11 RX ADMIN — GLYCOPYRROLATE 0.6 MG: 0.2 INJECTION, SOLUTION INTRAMUSCULAR; INTRAVENOUS at 10:06

## 2018-06-11 RX ADMIN — SODIUM CHLORIDE, SODIUM GLUCONATE, SODIUM ACETATE, POTASSIUM CHLORIDE, MAGNESIUM CHLORIDE, SODIUM PHOSPHATE, DIBASIC, AND POTASSIUM PHOSPHATE: .53; .5; .37; .037; .03; .012; .00082 INJECTION, SOLUTION INTRAVENOUS at 10:06

## 2018-06-11 RX ADMIN — ACETAMINOPHEN 1000 MG: 10 INJECTION, SOLUTION INTRAVENOUS at 09:06

## 2018-06-11 RX ADMIN — SODIUM CHLORIDE: 0.9 INJECTION, SOLUTION INTRAVENOUS at 08:06

## 2018-06-11 NOTE — ANESTHESIA PREPROCEDURE EVALUATION
06/11/2018  Chanelle Downey is a 58 y.o., female here for lap ruchi    Patient Active Problem List   Diagnosis    Right arm weakness    Degenerative cervical spinal stenosis    Cigarette nicotine dependence without complication    Chronic low back pain    History of back surgery    Methadone dependence    Cervical stenosis of spinal canal    Cervical radiculopathy    Chronic pain of left knee    HSV (herpes simplex virus) anogenital infection    Flat back syndrome, postprocedural    Sagittal plane imbalance    Failed back syndrome of lumbar spine    Calculus of gallbladder without cholecystitis without obstruction    Biliary colic         Anesthesia Evaluation    I have reviewed the Patient Summary Reports.    I have reviewed the Nursing Notes.   I have reviewed the Medications.     Review of Systems  Anesthesia Hx:  No problems with previous Anesthesia    Cardiovascular:  Cardiovascular Normal     Pulmonary:  Pulmonary Normal    Hepatic/GI:  Hepatic/GI Normal    Neurological:   Neuromuscular Disease,    Endocrine:  Endocrine Normal    Psych:   depression          Physical Exam  General:  Well nourished    Airway/Jaw/Neck:  Airway Findings: Mouth Opening: Normal Tongue: Normal  General Airway Assessment: Adult  Mallampati: II  TM Distance: Normal, at least 6 cm       Chest/Lungs:  Chest/Lungs Findings: Clear to auscultation, Normal Respiratory Rate     Heart/Vascular:  Heart Findings: Rate: Normal  Rhythm: Regular Rhythm             Anesthesia Plan  Type of Anesthesia, risks & benefits discussed:  Anesthesia Type:  general  Patient's Preference:   Intra-op Monitoring Plan: standard ASA monitors  Intra-op Monitoring Plan Comments:   Post Op Pain Control Plan: IV/PO Opioids PRN and multimodal analgesia  Post Op Pain Control Plan Comments:   Induction:    Beta Blocker:  Patient is not currently  on a Beta-Blocker (No further documentation required).       Informed Consent: Patient understands risks and agrees with Anesthesia plan.  Questions answered. Anesthesia consent signed with patient.  ASA Score: 2     Day of Surgery Review of History & Physical:            Ready For Surgery From Anesthesia Perspective.

## 2018-06-11 NOTE — DISCHARGE INSTRUCTIONS
Discharge Instructions for Laparoscopic Cholecystectomy  You have had a procedure known as a laparoscopic cholecystectomy. A laparoscopic cholecystectomy is a procedure to remove your gallbladder. People who have this procedure usually recover more quickly and have less pain than with open gallbladder surgery (called open cholecystectomy). Many surgeons recommend a low-fat diet, avoiding fried food in particular, for the first month after surgery.   You can live a full and healthy life without your gallbladder. This includes eating the foods and doing the things you enjoyed before your gallbladder problems started.  Home care  Recommendations for home care include the following:   · Ask someone to drive you to your appointments for the next 3 days. Dont drive until you are no longer taking pain medicine and are able to step on the brake pedal without hesitation.   · Wash the skin around your incision daily with mild soap and water. It's OK to shower 48 hours after your surgery.  · Eat your regular diet. It is wise to stay away from rich, greasy, or spicy food for a few days.  · Remember, it takes at least 1 week for you to get most of your strength and energy back.  · Make an office visit to talk to your healthcare provider if the following symptoms dont go away within a week after your surgery:  ¨ Fatigue  ¨ Pain around the incision  ¨ Diarrhea or constipation  ¨ Loss of appetite     When to call your healthcare provider  Call your healthcare provider immediately if you have any of the following:  · Yellowing of your eyes or skin (jaundice)  · Chills  · Fever of 100.4°F (38.0°C) or higher, or as directed by your healthcare provider   · Redness, swelling, increasing pain, pus, or a foul smell at the incision site  · Dark or rust-colored urine  · Stool that is carmelina-colored or light in color instead of brown  · Increasing belly pain  · Rectal bleeding  · Leg swelling or shortness of breath   Date Last Reviewed:  7/1/2016  © 8446-5825 [employer]. [address] [place] [address], [place] [address]. All rights reserved. This information is not intended as a substitute for professional medical care. Always follow your healthcare professional's instructions.      PATIENT INSTRUCTIONS  GALL BLADDER SURGERY  (CHOLECYSTECTOMY)    FOLLOW-UP:  Please make an appointment with your physician in 2 weeks. Call your physician immediately if you have any fevers greater than 102.5, drainage from your wound that is not clear or looks infected, persistent bleeding, increasing abdominal pain, problems urinating, or persistent nausea/vomiting.  You should be aware that you may have right shoulder pain after surgery and that this will progressively go away.  This is called 'referred pain' and is from the area of the gallbladder.  It can also be caused by gas that may be trapped under the diaphragm from the surgery, especially if it was performed laparoscopically through mini-incisions.  This gas will progressively get reabsorbed by your body.     WOUND CARE INSTRUCTIONS:  Keep a dry clean dressing on the wound if there is drainage. The initial bandage may be removed after 48 hours.  Once the wound has quit draining you may leave it open to air.  If clothing rubs against the wound or causes irritation and the wound is not draining you may cover it with a dry dressing during the daytime.  Try to keep the wound dry and avoid ointments on the wound unless directed to do so.  If the wound becomes bright red and painful or starts to drain infected material that is not clear, please contact your physician immediately.   You should also call if you begin to drain fluid that is thin and greenish-brown from the wound and appears to look like bile.  If the wound though is mildly pink and has a thick firm ridge underneath it, this is normal, and is referred to as a healing ridge.  This will resolve over the next 4-6 weeks.    DIET:  You may eat any  foods that you can tolerate.  It is a good idea to eat a high fiber diet and take in plenty of fluids to prevent constipation.  If you do become constipated you may want to take a mild laxative or take ducolax tablets on a daily basis until your bowel habits are regular.  Constipation can be very uncomfortable, along with straining, after recent abdominal surgery.    ACTIVITY:  You are encouraged to cough and deep breath or use your incentive spirometer if you were given one, every 15-30 minutes when awake.  This will help prevent respiratory complications and low grade fevers post-operatively.  You may want to hug a pillow when coughing and sneezing to add additional support to the surgical area(s) which will decrease pain during these times.  You are encouraged to walk and engage in light activity for the next two weeks.  You should not lift more than 20 pounds during this time frame as it could put you at increased risk for a post-operative hernia.  Twenty pounds is roughly equivalent to a plastic bag of groceries.      MEDICATIONS:  Try to take narcotic medications and anti-inflammatory medications, such as tylenol, ibuprofen, naprosyn, etc., with food.  This will minimize stomach upset from the medication.  Should you develop nausea and vomiting from the pain medication, or develop a rash, please discontinue the medication and contact your physician.  You should not drive, make important decisions, or operate machinery when taking narcotic pain medication.    QUESTIONS:  Please feel free to call your physician or the hospital  if you have any questions, and they will be glad to assist you.

## 2018-06-11 NOTE — PLAN OF CARE
VSS. Patient states pain is tolerable. No N&V. Pt voided w/o difficulty. SCD's in place throughout duration in PACU. Transferred to the next Phase of Care.

## 2018-06-11 NOTE — PLAN OF CARE
D/C instructions reviewed with pt. Questions answered. Handouts provided. Pt awake, alert, VSS, tolerating PO, voided w/o difficulty. Denies nausea. States pain is tolerable (only pain from chronic back problems, no pain at surgery site). Criteria met for d/c home. Son to drive.

## 2018-06-11 NOTE — OP NOTE
DATE OF PROCEDURE:  06/11/2018    PREOPERATIVE DIAGNOSIS:  Biliary colic.    POSTOPERATIVE DIAGNOSIS:  Biliary colic.    PROCEDURE:  Laparoscopic cholecystectomy.    SURGEON:  Yonatan Berry M.D.    ASSISTANT:  Amber Godoy M.D. (RES)    ANESTHESIA:  General.    BLOOD LOSS:  Minimal.    COMPLICATIONS:  None.    INDICATIONS:  A 58-year-old with postprandial abdominal pain and gallstones on   ultrasound.    OPERATIVE REPORT IN DETAIL:  The patient was bought to the Operating Room and   placed in the supine position, prepped and draped in sterile fashion.  Once   satisfactory general anesthesia was induced, an incision was made at the base of   the umbilicus, carried down through fascia and peritoneum.  The peritoneal   cavity was entered under direct vision.  Serjio cannula was inserted through   this defect.  However, initially a pursestring suture of 0 Vicryl was used to   minimize air leak.  Three 5-mm trocars were placed across the upper abdomen.    The gallbladder was grasped at the dome and infundibulum, retracted cephalad and   right lateral.  Cystic duct and artery were individually dissected, doubly   clipped and divided.  The duodenum was densely adherent to the lower third of   the gallbladder, but this was able to be taken down safely.  The gallbladder was   removed intact without spillage, placed in an EndoCatch bag and removed through   the umbilicus under visualization of a 5 mm scope in the epigastrium.  The 10   mm scope was reintroduced through the umbilicus.  Right upper quadrant was   visualized, irrigated.  There was no evidence of bleeding or enteric or biliary   spillage.  Trocars were removed under direct vision.  The umbilical fascial   defect was closed by tying the previously placed pursestring suture.  The wounds   were irrigated and then closed with subcuticular suture.  Needle, sponge and   instrument counts were correct.  The patient tolerated the procedure well and   was stable at  the completion of the operation.      ERIC/FRANCISCO  dd: 06/11/2018 10:19:10 (CDT)  td: 06/11/2018 11:29:13 (CDT)  Doc ID   #[id number]  Job ID #[id number]    CC:

## 2018-06-11 NOTE — BRIEF OP NOTE
[facility]  Brief Operative Note     SUMMARY     Surgery Date: 6/11/2018     Surgeon(s) and Role:     * Yonatan Berry MD - Primary     * Amber Godoy MD - Resident - Assisting        Pre-op Diagnosis:  Calculus of gallbladder without cholecystitis without obstruction [K80.20]    Post-op Diagnosis:  Post-Op Diagnosis Codes:     * Calculus of gallbladder without cholecystitis without obstruction [K80.20]    Procedure(s) (LRB):  CHOLECYSTECTOMY, LAPAROSCOPIC  (N/A)    Anesthesia: General    Description of the findings of the procedure:   Laparoscopic cholecystectomy with dense adhesions around gallbladder    Estimated Blood Loss: 10ml         Specimens:   Specimen (12h ago through future)    Start     Ordered    06/11/18 1009  Specimen to Pathology - Surgery  Once     Comments:  1. Gallbladder - Permanent      06/11/18 1013          Discharge Note    SUMMARY     Admit Date: 6/11/2018    Discharge Date and Time:  06/11/2018 10:43 AM    Hospital Course (synopsis of major diagnoses, care, treatment, and services provided during the course of the hospital stay):   Patient was admitted for the above procedure which she tolerated well. She was discharged home when she met PACU criteria     Final Diagnosis: Post-Op Diagnosis Codes:     * Calculus of gallbladder without cholecystitis without obstruction [K80.20]    Disposition: Home or Self Care    Follow Up/Patient Instructions:     Medications:  Reconciled Home Medications:      Medication List      CHANGE how you take these medications    * ibuprofen 200 MG tablet  Commonly known as:  ADVIL,MOTRIN  Take 200 mg by mouth every 6 (six) hours as needed for Pain.  What changed:  Another medication with the same name was added. Make sure you understand how and when to take each.     * ibuprofen 800 MG tablet  Commonly known as:  ADVIL,MOTRIN  Take 1 tablet (800 mg total) by mouth 3 (three) times daily.  What changed:  You were already taking a  medication with the same name, and this prescription was added. Make sure you understand how and when to take each.        * This list has 2 medication(s) that are the same as other medications prescribed for you. Read the directions carefully, and ask your doctor or other care provider to review them with you.            CONTINUE taking these medications    albuterol 90 mcg/actuation inhaler  Inhale 2 puffs into the lungs every 4 (four) hours as needed for Wheezing.     DULoxetine 30 MG capsule  Commonly known as:  CYMBALTA  take 3 tablet by mouth once daily     furosemide 20 MG tablet  Commonly known as:  LASIX  Take 1 tablet (20 mg total) by mouth once daily.     gabapentin 300 MG capsule  Commonly known as:  NEURONTIN  Take 1 capsule (300 mg total) by mouth 3 (three) times daily as needed.     methadone 5 MG tablet  Commonly known as:  DOLOPHINE  Take 105 mg by mouth once daily.     omeprazole 40 MG capsule  Commonly known as:  PRILOSEC  TAKE 1 CAPSULE BY MOUTH EVERY DAY        STOP taking these medications    azithromycin 250 MG tablet  Commonly known as:  ZITHROMAX Z-DANNY     ESTRACE 0.01 % (0.1 mg/gram) vaginal cream  Generic drug:  estradiol            Discharge Procedure Orders  Diet Adult Regular     Lifting restrictions   Order Comments: No lifting more than 10lbs for 4 weeks     Notify your health care provider if you experience any of the following:  temperature >100.4     Notify your health care provider if you experience any of the following:  persistent nausea and vomiting or diarrhea     Notify your health care provider if you experience any of the following:  severe uncontrolled pain     Notify your health care provider if you experience any of the following:  redness, tenderness, or signs of infection (pain, swelling, redness, odor or green/yellow discharge around incision site)     Notify your health care provider if you experience any of the following:  difficulty breathing or increased cough      Notify your health care provider if you experience any of the following:  severe persistent headache     Notify your health care provider if you experience any of the following:  worsening rash     Notify your health care provider if you experience any of the following:  persistent dizziness, light-headedness, or visual disturbances     Notify your health care provider if you experience any of the following:  increased confusion or weakness     Remove dressing in 48 hours       Follow-up Information     Yonatan Berry MD In 2 weeks.    Specialties:  General Surgery, Surgery  Contact information:  [facility] [address]  [facility] [address]  [phone]                 Amber Godoy MD, PGY-2  General Surgery  [phone]

## 2018-06-11 NOTE — H&P (VIEW-ONLY)
History & Physical    SUBJECTIVE:     History of Present Illness:  Chanelle Downey is a 58 y.o. female with h/o back pain and multiple surgeries who presents for evaluation of biliary ductal dilation noted on an abdominal US over a year ago. Patient reports long history of intermittent abdominal cramping. Unknown if associated with eating. Attacks start acutely and improve over the next several hours. She describes upper abdominal pain, sharp. Abdominal US on 2017 showed cholelithiasis with CBD measuring 8mm. LFTs in 2018 were wnl. Some nausea, no vomiting, no fever/chills, diarrhea, or blood in stool.   1 pack per day smoker.   PSH includes appendectomy,  x2, hysterectomy.    Chief Complaint   Patient presents with    Consult       Review of patient's allergies indicates:  No Known Allergies    Current Outpatient Prescriptions   Medication Sig Dispense Refill    albuterol 90 mcg/actuation inhaler Inhale 2 puffs into the lungs every 4 (four) hours as needed for Wheezing. 1 Inhaler 3    azithromycin (ZITHROMAX Z-DANNY) 250 MG tablet Take 2 pills day 1, then 1 pill day 2-5. 6 tablet 0    DULoxetine (CYMBALTA) 30 MG capsule take 3 capsules by mouth once daily 90 capsule 0    ESTRACE 0.01 % (0.1 mg/gram) vaginal cream PLACE 1 GRAM VAGINALLY EVERY DAY 42.5 g 0    ESTRACE 0.01 % (0.1 mg/gram) vaginal cream PLACE 1 GRAM VAGINALLY EVERY DAY 42.5 g 0    gabapentin (NEURONTIN) 300 MG capsule Take 1 capsule (300 mg total) by mouth 3 (three) times daily as needed. 270 capsule 1    ibuprofen (ADVIL,MOTRIN) 200 MG tablet Take 200 mg by mouth every 6 (six) hours as needed for Pain.      methadone (DOLOPHINE) 5 MG tablet Take 110 mg by mouth once daily.       omeprazole (PRILOSEC) 40 MG capsule TAKE 1 CAPSULE BY MOUTH EVERY DAY 90 capsule 0    furosemide (LASIX) 20 MG tablet Take 1 tablet (20 mg total) by mouth once daily. 30 tablet 0     No current facility-administered medications for this visit.         Past Medical History:   Diagnosis Date    Back pain, chronic     Depression      Past Surgical History:   Procedure Laterality Date    APPENDECTOMY       SECTION      EYE SURGERY      For strabismus    HYSTERECTOMY      LUMBAR DISCECTOMY  2002    Unknown vertebra    LUMBAR FUSION  2010    L4-S1 fusion reported    TONSILLECTOMY       Family History   Problem Relation Age of Onset    Cancer Mother         non hodgkins lymphoma    Alcohol abuse Mother     Arthritis Mother     Depression Mother     Hypertension Mother     Heart disease Mother     Mental illness Daughter     Birth defects Daughter      Social History   Substance Use Topics    Smoking status: Current Every Day Smoker     Packs/day: 0.50     Types: Cigarettes    Smokeless tobacco: Current User    Alcohol use 0.0 oz/week        Review of Systems:  Review of Systems   Constitutional: Negative for chills and fever.   Respiratory: Negative for cough and shortness of breath.    Cardiovascular: Negative for chest pain and palpitations.   Gastrointestinal: Negative for abdominal distention, abdominal pain, constipation, diarrhea and nausea.   Musculoskeletal: Positive for back pain. Negative for myalgias.   Neurological: Negative for dizziness and headaches.   Psychiatric/Behavioral: Negative for agitation and confusion.       OBJECTIVE:     Vital Signs (Most Recent)  Temp: 98.7 °F (37.1 °C) (18)  Pulse: 87 (18)  BP: 136/70 (18)  5' (1.524 m)  89.4 kg (196 lb 15.7 oz)     Physical Exam:  Physical Exam   Constitutional: She is oriented to person, place, and time. She appears well-developed and well-nourished. No distress.   Cardiovascular: Normal rate and regular rhythm.    Pulmonary/Chest: Effort normal. No respiratory distress.   Abdominal: Soft. She exhibits no distension and no mass. There is no tenderness. There is no rebound and no guarding. No hernia.   Well healed lower midline incision    Neurological: She is alert and oriented to person, place, and time.   Skin: Skin is warm and dry.   Psychiatric: She has a normal mood and affect. Her behavior is normal.       ASSESSMENT/PLAN:     Chanelle Downey is a 58 y.o. female with cholelithiasis and symptoms consistent with biliary colic.     PLAN:  LFTs prior to OR  Plan for lap ruchi  Risks and benefits explained, questions answered, consent obtained.     Amber Godoy MD, PGY-2  General Surgery  [phone]         I have personally performed a detailed history and physical examination on this patient. My findings are summarized in the resident's note included in the record.

## 2018-06-11 NOTE — PROGRESS NOTES
Dr. Godoy at bedside. Pts FMLA papers sent to clinic, will fax to appropriate office when completed.

## 2018-06-11 NOTE — TRANSFER OF CARE
Anesthesia Transfer of Care Note    Patient: Chanelle Downey    Procedure(s) Performed: Procedure(s) (LRB):  CHOLECYSTECTOMY, LAPAROSCOPIC  (N/A)    Patient location: PACU    Anesthesia Type: general    Transport from OR: Transported from OR on 6-10 L/min O2 by face mask with adequate spontaneous ventilation    Post pain: adequate analgesia    Post assessment: no apparent anesthetic complications and tolerated procedure well    Post vital signs: stable    Level of consciousness: sedated    Nausea/Vomiting: no vomiting    Complications: none    Transfer of care protocol was followed      Last vitals:   Visit Vitals  BP (!) 122/58 (BP Location: Left arm, Patient Position: Lying)   Pulse 77   Temp 36.7 °C (98.1 °F) (Oral)   Resp 16   Ht 5' (1.524 m)   Wt 86.2 kg (190 lb)   SpO2 98%   Breastfeeding? No   BMI 37.11 kg/m²

## 2018-06-14 NOTE — ANESTHESIA POSTPROCEDURE EVALUATION
Anesthesia Post Evaluation    Patient: Chanelle Downey    Procedure(s) Performed: Procedure(s) (LRB):  CHOLECYSTECTOMY, LAPAROSCOPIC  (N/A)    Final Anesthesia Type: general  Patient location during evaluation: PACU  Patient participation: Yes- Able to Participate  Level of consciousness: awake and alert  Post-procedure vital signs: reviewed and stable  Pain management: adequate  Airway patency: patent  PONV status at discharge: No PONV  Anesthetic complications: no      Cardiovascular status: blood pressure returned to baseline  Respiratory status: unassisted  Hydration status: euvolemic  Follow-up not needed.        Visit Vitals  BP (!) 141/68 (BP Location: Right arm, Patient Position: Lying)   Pulse 95   Temp 36.8 °C (98.2 °F) (Temporal)   Resp 16   Ht 5' (1.524 m)   Wt 86.2 kg (190 lb)   SpO2 96%   Breastfeeding? No   BMI 37.11 kg/m²       Pain/Jamie Score: No Data Recorded

## 2018-06-21 ENCOUNTER — TELEPHONE (OUTPATIENT)
Dept: SURGERY | Facility: CLINIC | Age: 58
End: 2018-06-21

## 2018-06-21 NOTE — TELEPHONE ENCOUNTER
----- Message from Rajeev Tariq sent at 6/21/2018 10:49 AM CDT -----  Contact: Pt  Needs Advice    Reason for call:    Pt would like a call regarding her post op appt on today. States she has to reschedule due to her transportation.   Communication Preference: [phone]   Additional Information:

## 2018-06-21 NOTE — TELEPHONE ENCOUNTER
----- Message from Lyudmila Mcgowan sent at 6/21/2018  9:06 AM CDT -----  Contact: patient  Patient needs to speak with the nurse to reschedule her post op that was today, patient's # is [phone]

## 2018-06-21 NOTE — TELEPHONE ENCOUNTER
Spoke with pt. States she can not make it here today for 9:45 am. Needs a little later time. Informed pt that she can come in as late as 11:15 am before Dr Berry goes back to surgery. Pt states she will still come in today t\hen, just could not make it for the 9:45 am time.Reception desk notified.

## 2018-06-28 ENCOUNTER — OFFICE VISIT (OUTPATIENT)
Dept: SURGERY | Facility: CLINIC | Age: 58
End: 2018-06-28
Payer: COMMERCIAL

## 2018-06-28 VITALS
TEMPERATURE: 99 F | WEIGHT: 197.44 LBS | BODY MASS INDEX: 38.56 KG/M2 | HEART RATE: 87 BPM | SYSTOLIC BLOOD PRESSURE: 130 MMHG | DIASTOLIC BLOOD PRESSURE: 62 MMHG

## 2018-06-28 DIAGNOSIS — K80.20 GALLSTONES: Primary | ICD-10-CM

## 2018-06-28 PROCEDURE — 99024 POSTOP FOLLOW-UP VISIT: CPT | Mod: S$GLB,,, | Performed by: SURGERY

## 2018-06-28 PROCEDURE — 99999 PR PBB SHADOW E&M-EST. PATIENT-LVL III: CPT | Mod: PBBFAC,,, | Performed by: SURGERY

## 2018-06-28 NOTE — LETTER
June 28, 2018      [facility] - General Surgery  [address]  [place] [address]  Phone: [phone]       Patient: Chanelle Downey   YOB: 1960  Date of Visit: 06/28/2018    To Whom It May Concern:    Ayaka Downey  was at [facility] on 06/28/2018.She may return to work on July 1, 2018 with no restrictions. If you have any questions or concerns, or if I can be of further assistance, please do not hesitate to contact me.    Sincerely,        Yonatan Berry MD

## 2018-06-28 NOTE — PROGRESS NOTES
Post op Visit    S:  S/p lap ruchi. Path consistent with chronic cholecystitis. Feeling much better. Tolerating diet, normal bowel movements. No pain.    O:  NAD  Abdomen soft, NT, ND, incisions c/d/i    A/P  S/p lap ruchi, doing well  RTC PRN    Amber Godoy MD, PGY-2  General Surgery  [phone]     I have personally performed a detailed history and physical examination on this patient. My findings are summarized in the resident's note included in the record.

## 2018-07-22 DIAGNOSIS — F32.A CHRONIC DEPRESSION: ICD-10-CM

## 2018-07-23 RX ORDER — DULOXETIN HYDROCHLORIDE 30 MG/1
CAPSULE, DELAYED RELEASE ORAL
Qty: 90 CAPSULE | Refills: 0 | Status: SHIPPED | OUTPATIENT
Start: 2018-07-23 | End: 2018-09-12 | Stop reason: SDUPTHER

## 2018-07-24 ENCOUNTER — TELEPHONE (OUTPATIENT)
Dept: FAMILY MEDICINE | Facility: CLINIC | Age: 58
End: 2018-07-24

## 2018-08-13 ENCOUNTER — PATIENT MESSAGE (OUTPATIENT)
Dept: RESEARCH | Facility: HOSPITAL | Age: 58
End: 2018-08-13

## 2018-08-20 DIAGNOSIS — K21.9 GASTROESOPHAGEAL REFLUX DISEASE, ESOPHAGITIS PRESENCE NOT SPECIFIED: ICD-10-CM

## 2018-08-20 RX ORDER — OMEPRAZOLE 40 MG/1
CAPSULE, DELAYED RELEASE ORAL
Qty: 90 CAPSULE | Refills: 0 | Status: SHIPPED | OUTPATIENT
Start: 2018-08-20 | End: 2018-12-13 | Stop reason: SDUPTHER

## 2018-09-12 DIAGNOSIS — F32.A CHRONIC DEPRESSION: ICD-10-CM

## 2018-09-12 RX ORDER — DULOXETIN HYDROCHLORIDE 30 MG/1
CAPSULE, DELAYED RELEASE ORAL
Qty: 90 CAPSULE | Refills: 0 | Status: SHIPPED | OUTPATIENT
Start: 2018-09-12 | End: 2018-09-26

## 2018-09-16 ENCOUNTER — HOSPITAL ENCOUNTER (EMERGENCY)
Facility: OTHER | Age: 58
Discharge: HOME | End: 2018-09-16
Attending: EMERGENCY MEDICINE
Payer: COMMERCIAL

## 2018-09-16 VITALS
WEIGHT: 192 LBS | RESPIRATION RATE: 18 BRPM | HEIGHT: 60 IN | BODY MASS INDEX: 37.69 KG/M2 | TEMPERATURE: 98 F | OXYGEN SATURATION: 96 % | HEART RATE: 78 BPM | DIASTOLIC BLOOD PRESSURE: 64 MMHG | SYSTOLIC BLOOD PRESSURE: 120 MMHG

## 2018-09-16 DIAGNOSIS — M54.12 CERVICAL RADICULOPATHY: Primary | ICD-10-CM

## 2018-09-16 DIAGNOSIS — S16.1XXA CERVICAL STRAIN, ACUTE, INITIAL ENCOUNTER: ICD-10-CM

## 2018-09-16 PROCEDURE — 63600175 PHARM REV CODE 636 W HCPCS: Performed by: EMERGENCY MEDICINE

## 2018-09-16 PROCEDURE — 99283 EMERGENCY DEPT VISIT LOW MDM: CPT | Mod: 25

## 2018-09-16 PROCEDURE — 96372 THER/PROPH/DIAG INJ SC/IM: CPT

## 2018-09-16 RX ORDER — KETOROLAC TROMETHAMINE 30 MG/ML
30 INJECTION, SOLUTION INTRAMUSCULAR; INTRAVENOUS
Status: COMPLETED | OUTPATIENT
Start: 2018-09-16 | End: 2018-09-16

## 2018-09-16 RX ORDER — ORPHENADRINE CITRATE 100 MG/1
100 TABLET, EXTENDED RELEASE ORAL DAILY PRN
Qty: 5 TABLET | Refills: 0 | Status: SHIPPED | OUTPATIENT
Start: 2018-09-16 | End: 2018-09-21

## 2018-09-16 RX ORDER — OXYCODONE AND ACETAMINOPHEN 5; 325 MG/1; MG/1
1 TABLET ORAL EVERY 6 HOURS PRN
Qty: 12 TABLET | Refills: 0 | Status: SHIPPED | OUTPATIENT
Start: 2018-09-16 | End: 2018-09-26

## 2018-09-16 RX ORDER — DEXAMETHASONE SODIUM PHOSPHATE 4 MG/ML
8 INJECTION, SOLUTION INTRA-ARTICULAR; INTRALESIONAL; INTRAMUSCULAR; INTRAVENOUS; SOFT TISSUE
Status: COMPLETED | OUTPATIENT
Start: 2018-09-16 | End: 2018-09-16

## 2018-09-16 RX ADMIN — DEXAMETHASONE SODIUM PHOSPHATE 8 MG: 4 INJECTION, SOLUTION INTRAMUSCULAR; INTRAVENOUS at 08:09

## 2018-09-16 RX ADMIN — KETOROLAC TROMETHAMINE 30 MG: 30 INJECTION, SOLUTION INTRAMUSCULAR at 08:09
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
## 2018-09-26 ENCOUNTER — OFFICE VISIT (OUTPATIENT)
Dept: FAMILY MEDICINE | Facility: CLINIC | Age: 58
End: 2018-09-26
Payer: COMMERCIAL

## 2018-09-26 VITALS
BODY MASS INDEX: 39.17 KG/M2 | SYSTOLIC BLOOD PRESSURE: 110 MMHG | RESPIRATION RATE: 20 BRPM | HEIGHT: 60 IN | OXYGEN SATURATION: 98 % | TEMPERATURE: 99 F | WEIGHT: 199.5 LBS | HEART RATE: 93 BPM | DIASTOLIC BLOOD PRESSURE: 70 MMHG

## 2018-09-26 DIAGNOSIS — F17.210 CIGARETTE NICOTINE DEPENDENCE WITHOUT COMPLICATION: ICD-10-CM

## 2018-09-26 DIAGNOSIS — J30.9 ALLERGIC RHINITIS, UNSPECIFIED SEASONALITY, UNSPECIFIED TRIGGER: ICD-10-CM

## 2018-09-26 DIAGNOSIS — M54.50 CHRONIC BILATERAL LOW BACK PAIN WITHOUT SCIATICA: ICD-10-CM

## 2018-09-26 DIAGNOSIS — Z12.39 BREAST CANCER SCREENING: ICD-10-CM

## 2018-09-26 DIAGNOSIS — M54.12 CERVICAL RADICULOPATHY: Primary | ICD-10-CM

## 2018-09-26 DIAGNOSIS — Z23 NEED FOR INFLUENZA VACCINATION: ICD-10-CM

## 2018-09-26 DIAGNOSIS — Z12.11 COLON CANCER SCREENING: ICD-10-CM

## 2018-09-26 DIAGNOSIS — F32.A CHRONIC DEPRESSION: ICD-10-CM

## 2018-09-26 DIAGNOSIS — Z12.11 SCREEN FOR COLON CANCER: ICD-10-CM

## 2018-09-26 DIAGNOSIS — G89.29 CHRONIC BILATERAL LOW BACK PAIN WITHOUT SCIATICA: ICD-10-CM

## 2018-09-26 PROCEDURE — 99999 PR PBB SHADOW E&M-EST. PATIENT-LVL IV: CPT | Mod: PBBFAC,,, | Performed by: FAMILY MEDICINE

## 2018-09-26 PROCEDURE — 3008F BODY MASS INDEX DOCD: CPT | Mod: CPTII,S$GLB,, | Performed by: FAMILY MEDICINE

## 2018-09-26 PROCEDURE — 99215 OFFICE O/P EST HI 40 MIN: CPT | Mod: S$GLB,,, | Performed by: FAMILY MEDICINE

## 2018-09-26 RX ORDER — DULOXETIN HYDROCHLORIDE 60 MG/1
60 CAPSULE, DELAYED RELEASE ORAL 2 TIMES DAILY
Qty: 180 CAPSULE | Refills: 2 | Status: SHIPPED | OUTPATIENT
Start: 2018-09-26 | End: 2019-11-04 | Stop reason: SDUPTHER

## 2018-09-26 RX ORDER — FLUTICASONE PROPIONATE 50 MCG
1 SPRAY, SUSPENSION (ML) NASAL 2 TIMES DAILY
Qty: 1 BOTTLE | Refills: 3 | Status: SHIPPED | OUTPATIENT
Start: 2018-09-26 | End: 2020-02-21

## 2018-09-26 NOTE — PROGRESS NOTES
Health Maintenance     Colonoscopy Ok to get fitkit     Mammogram Scheduled    Influenza Vaccine Decline

## 2018-09-26 NOTE — PROGRESS NOTES
Subjective:       Patient ID: Chanelle Downey is a 58 y.o. female.    Chief Complaint: Mild intermittent reactive airway disease without complicati (4 weeks follow up )    HPI  ER f/u     Neck pain - pain was severe when it started a few weeks ago, but it has improved remarkably since then with prn anti - inflammatories and norflex.     Nicotine Dep - pt is back up to 1 ppd. Life is stressful with her son and she uses smoking as a stress reliever.      Chronic low back pain - chronic stable - patient is managing fairly well with current med ago treatment    Chronic opioid dep - currently is on methadone 105mg daily. She is weaning off slowly.       Current Outpatient Medications on File Prior to Visit   Medication Sig Dispense Refill    ibuprofen (ADVIL,MOTRIN) 800 MG tablet Take 800 mg by mouth as needed for Pain.       methadone HCl (METHADONE ORAL) Take 105 mg by mouth once daily.       omeprazole (PRILOSEC) 40 MG capsule TAKE 1 CAPSULE BY MOUTH EVERY DAY 90 capsule 0    [DISCONTINUED] DULoxetine (CYMBALTA) 30 MG capsule TAKE 3 CAPSULES BY MOUTH EVERY DAY 90 capsule 0    albuterol 90 mcg/actuation inhaler Inhale 2 puffs into the lungs every 4 (four) hours as needed for Wheezing. 1 Inhaler 3    [DISCONTINUED] furosemide (LASIX) 20 MG tablet Take 1 tablet (20 mg total) by mouth once daily. 30 tablet 0    [DISCONTINUED] gabapentin (NEURONTIN) 300 MG capsule Take 1 capsule (300 mg total) by mouth 3 (three) times daily as needed. 270 capsule 1    [DISCONTINUED] oxyCODONE-acetaminophen (PERCOCET) 5-325 mg per tablet Take 1 tablet by mouth every 6 (six) hours as needed for Pain. 12 tablet 0     No current facility-administered medications on file prior to visit.        Past Medical History:   Diagnosis Date    Back pain, chronic     Depression        Family History   Problem Relation Age of Onset    Cancer Mother         non hodgkins lymphoma    Alcohol abuse Mother     Arthritis Mother     Depression  Mother     Hypertension Mother     Heart disease Mother     Mental illness Daughter     Birth defects Daughter         reports that she has been smoking cigarettes.  She has been smoking about 0.50 packs per day. She uses smokeless tobacco. She reports that she drinks alcohol. She reports that she does not use drugs.    Review of Systems   Musculoskeletal: Positive for arthralgias, back pain and gait problem.   Neurological: Negative for weakness and numbness.       Objective:     Vitals:    09/26/18 1445   BP: 110/70   Pulse: 93   Resp: 20   Temp: 98.5 °F (36.9 °C)        Physical Exam   Constitutional: She appears well-developed. No distress.   Antalgic gait   HENT:   Head: Normocephalic and atraumatic.   Eyes: Conjunctivae are normal. No scleral icterus.   Pulmonary/Chest: Effort normal.   Musculoskeletal:   Decreased mobility low back   Neurological: She is alert.   Skin: She is not diaphoretic.   Psychiatric: She has a normal mood and affect. Her behavior is normal.   Vitals reviewed.      Assessment:       1. Cervical radiculopathy    2. Chronic bilateral low back pain without sciatica    3. Screen for colon cancer    4. Cigarette nicotine dependence without complication    5. Allergic rhinitis, unspecified seasonality, unspecified trigger    6. Breast cancer screening    7. Colon cancer screening    8. Chronic depression    9. Need for influenza vaccination        Plan:       Chanelle was seen today for mild intermittent reactive airway disease without complicati.    Diagnoses and all orders for this visit:    Cervical radiculopathy  - Chronic - stable     Pt is doing well on current therapy. No side effects noted. Will continue current therapy.    Chronic bilateral low back pain without sciatica  - Chronic - stable     Pt is doing well on current therapy. No side effects noted. Will continue current therapy.  Screen for colon cancer  -     Fecal Immunochemical Test (iFOBT); Future    Cigarette nicotine  dependence without complication  -     Ambulatory referral to Smoking Cessation Program    Allergic rhinitis, unspecified seasonality, unspecified trigger  Pts sxs and PE most coincide with AR. Will give pt a trial of flonase and daily zyrtec for relief of pts sxs. Pt asked to allow 2 weeks of consistent use before evaluating the efficacy of flonase. Pt to call back if sxs worsen or do not improve in 2 weeks.     Breast cancer screening  -     Mammo Digital Screening Bilat with CAD; Future    Colon cancer screening  -     Case request GI: COLONOSCOPY    Chronic depression  -     DULoxetine (CYMBALTA) 60 MG capsule; Take 1 capsule (60 mg total) by mouth 2 (two) times daily.  Patient is having issues obtaining Cymbalta from pharmacy her current dose of 90 mg.  She has only been given 10 day supply that time current.  Will try patient on 60 mg b.i.d. but if patient feels this is too much she can start on 60 mg daily the 1st.  Patient will contact me if she has any concerns.  Need for influenza vaccination  Patient refused citing safety concerns.          Follow-up in about 3 months (around 12/26/2018).        Pt verbalized understanding and agreed with our plan.

## 2018-10-17 ENCOUNTER — TELEPHONE (OUTPATIENT)
Dept: SMOKING CESSATION | Facility: CLINIC | Age: 58
End: 2018-10-17

## 2018-10-17 NOTE — TELEPHONE ENCOUNTER
Called patient to reschedule missed intake appointment, she has rescheduled and confirmed for 10/24/18 @ 1:00 pm.

## 2018-11-02 ENCOUNTER — TELEPHONE (OUTPATIENT)
Dept: SLEEP MEDICINE | Facility: CLINIC | Age: 58
End: 2018-11-02

## 2018-12-11 ENCOUNTER — PATIENT MESSAGE (OUTPATIENT)
Dept: ADMINISTRATIVE | Facility: HOSPITAL | Age: 58
End: 2018-12-11

## 2018-12-13 DIAGNOSIS — Z12.11 COLON CANCER SCREENING: Primary | ICD-10-CM

## 2018-12-13 DIAGNOSIS — K21.9 GASTROESOPHAGEAL REFLUX DISEASE, ESOPHAGITIS PRESENCE NOT SPECIFIED: ICD-10-CM

## 2018-12-13 RX ORDER — OMEPRAZOLE 40 MG/1
40 CAPSULE, DELAYED RELEASE ORAL DAILY
Qty: 90 CAPSULE | Refills: 0 | Status: SHIPPED | OUTPATIENT
Start: 2018-12-13 | End: 2019-04-17 | Stop reason: SDUPTHER

## 2019-01-14 DIAGNOSIS — Z12.11 COLON CANCER SCREENING: Primary | ICD-10-CM

## 2019-04-17 DIAGNOSIS — K21.9 GASTROESOPHAGEAL REFLUX DISEASE, ESOPHAGITIS PRESENCE NOT SPECIFIED: ICD-10-CM

## 2019-04-17 RX ORDER — OMEPRAZOLE 40 MG/1
CAPSULE, DELAYED RELEASE ORAL
Qty: 90 CAPSULE | Refills: 0 | Status: SHIPPED | OUTPATIENT
Start: 2019-04-17 | End: 2019-07-16 | Stop reason: SDUPTHER

## 2019-07-16 DIAGNOSIS — K21.9 GASTROESOPHAGEAL REFLUX DISEASE, ESOPHAGITIS PRESENCE NOT SPECIFIED: ICD-10-CM

## 2019-07-16 RX ORDER — OMEPRAZOLE 40 MG/1
CAPSULE, DELAYED RELEASE ORAL
Qty: 90 CAPSULE | Refills: 0 | Status: SHIPPED | OUTPATIENT
Start: 2019-07-16 | End: 2019-10-24 | Stop reason: SDUPTHER

## 2019-10-24 DIAGNOSIS — K21.9 GASTROESOPHAGEAL REFLUX DISEASE, ESOPHAGITIS PRESENCE NOT SPECIFIED: ICD-10-CM

## 2019-10-29 RX ORDER — OMEPRAZOLE 40 MG/1
CAPSULE, DELAYED RELEASE ORAL
Qty: 90 CAPSULE | Refills: 0 | Status: SHIPPED | OUTPATIENT
Start: 2019-10-29 | End: 2020-02-13 | Stop reason: SDUPTHER

## 2019-11-04 ENCOUNTER — OFFICE VISIT (OUTPATIENT)
Dept: INTERNAL MEDICINE | Facility: CLINIC | Age: 59
End: 2019-11-04
Payer: COMMERCIAL

## 2019-11-04 VITALS
BODY MASS INDEX: 39.25 KG/M2 | WEIGHT: 199.94 LBS | OXYGEN SATURATION: 98 % | HEART RATE: 94 BPM | HEIGHT: 60 IN | DIASTOLIC BLOOD PRESSURE: 92 MMHG | SYSTOLIC BLOOD PRESSURE: 140 MMHG

## 2019-11-04 DIAGNOSIS — G89.4 CHRONIC PAIN SYNDROME: ICD-10-CM

## 2019-11-04 DIAGNOSIS — B00.2 HERPES GINGIVOSTOMATITIS: ICD-10-CM

## 2019-11-04 DIAGNOSIS — M40.30 FLAT BACK SYNDROME, POSTPROCEDURAL: ICD-10-CM

## 2019-11-04 DIAGNOSIS — A60.09 HERPES GENITALIS IN WOMEN: Primary | ICD-10-CM

## 2019-11-04 PROBLEM — K80.20 CALCULUS OF GALLBLADDER WITHOUT CHOLECYSTITIS WITHOUT OBSTRUCTION: Status: RESOLVED | Noted: 2018-05-14 | Resolved: 2019-11-04

## 2019-11-04 PROBLEM — K80.50 BILIARY COLIC: Status: RESOLVED | Noted: 2018-06-11 | Resolved: 2019-11-04

## 2019-11-04 PROCEDURE — 99214 PR OFFICE/OUTPT VISIT, EST, LEVL IV, 30-39 MIN: ICD-10-PCS | Mod: S$GLB,,, | Performed by: INTERNAL MEDICINE

## 2019-11-04 PROCEDURE — 99214 OFFICE O/P EST MOD 30 MIN: CPT | Mod: S$GLB,,, | Performed by: INTERNAL MEDICINE

## 2019-11-04 PROCEDURE — 99999 PR PBB SHADOW E&M-EST. PATIENT-LVL III: ICD-10-PCS | Mod: PBBFAC,,, | Performed by: INTERNAL MEDICINE

## 2019-11-04 PROCEDURE — 99999 PR PBB SHADOW E&M-EST. PATIENT-LVL III: CPT | Mod: PBBFAC,,, | Performed by: INTERNAL MEDICINE

## 2019-11-04 RX ORDER — TIZANIDINE 4 MG/1
4 TABLET ORAL EVERY 8 HOURS PRN
Qty: 60 TABLET | Refills: 0 | Status: SHIPPED | OUTPATIENT
Start: 2019-11-04 | End: 2020-02-21

## 2019-11-04 RX ORDER — VALACYCLOVIR HYDROCHLORIDE 500 MG/1
500 TABLET, FILM COATED ORAL DAILY
Qty: 90 TABLET | Refills: 1 | Status: SHIPPED | OUTPATIENT
Start: 2019-11-04 | End: 2020-05-19 | Stop reason: SDUPTHER

## 2019-11-04 RX ORDER — DULOXETIN HYDROCHLORIDE 60 MG/1
60 CAPSULE, DELAYED RELEASE ORAL 2 TIMES DAILY
Qty: 180 CAPSULE | Refills: 1 | Status: SHIPPED | OUTPATIENT
Start: 2019-11-04 | End: 2020-02-21 | Stop reason: SDUPTHER

## 2019-11-04 NOTE — PROGRESS NOTES
INTERNAL MEDICINE CLINIC - [facility] Day Clinic  Progress Note     PRESENTING HISTORY     PCP: Jamari Murguia MD    Current Chief Complaint/Problem:    Chief Complaint   Patient presents with    Med refill and sore that won't heal     History of Present Illness & ROS: Ms. Chanelle Downey is a 59 y.o. female.    She complains of pain in the left lateral tongue area.  It comes and goes.      She has chronic pain to back.  She goes to [facility] [place] treatment.    Currently on 105 mg daily.    She pulls her right shoulder muscle last week.       PAST HISTORY:     Past Medical History:   Diagnosis Date    Back pain, chronic     Biliary colic 2018    Calculus of gallbladder without cholecystitis without obstruction 2018    Cervical radiculopathy 3/21/2016    01/17/18--clinic visit, pt describes intermittent BUE numbness/tingling throughout entire arm/hand.  Negative Spurling's test bilaterally.    Cervical stenosis of spinal canal 3/21/2016    Degenerative cervical spinal stenosis 2/10/2016    Depression     Flat back syndrome, postprocedural 2018    Dx 10/2016 when seen in NSGY clinic.  Presents to Neurology clinic 18 for worsening gait, LBP, LLE weakness, and RLE neuropathic pain.    HSV (herpes simplex virus) anogenital infection 2016       Past Surgical History:   Procedure Laterality Date    APPENDECTOMY       SECTION      EYE SURGERY      For strabismus    HYSTERECTOMY      LAPAROSCOPIC CHOLECYSTECTOMY N/A 2018    Procedure: CHOLECYSTECTOMY, LAPAROSCOPIC ;  Surgeon: Yonaatn Berry MD;  Location: [facility] OR [address] [place] [address];  Service: General;  Laterality: N/A;    LUMBAR DISCECTOMY      Unknown vertebra    LUMBAR FUSION  2010    L4-S1 fusion reported    TONSILLECTOMY         Family History   Problem Relation Age of Onset    Cancer Mother         non hodgkins lymphoma    Alcohol abuse Mother     Arthritis Mother     Depression  Mother     Hypertension Mother     Heart disease Mother     Mental illness Daughter     Birth defects Daughter        Social History     Socioeconomic History    Marital status:      Spouse name: Not on file    Number of children: Not on file    Years of education: Not on file    Highest education level: Not on file   Occupational History    Not on file   Social Needs    Financial resource strain: Somewhat hard    Food insecurity:     Worry: Often true     Inability: Often true    Transportation needs:     Medical: No     Non-medical: No   Tobacco Use    Smoking status: Current Every Day Smoker     Packs/day: 0.50     Types: Cigarettes    Smokeless tobacco: Current User   Substance and Sexual Activity    Alcohol use: Yes     Alcohol/week: 0.0 standard drinks     Frequency: Never     Binge frequency: Never    Drug use: No    Sexual activity: Not Currently     Partners: Male   Lifestyle    Physical activity:     Days per week: 4 days     Minutes per session: Not on file    Stress: Not on file   Relationships    Social connections:     Talks on phone: Never     Gets together: Never     Attends Gnosticism service: Not on file     Active member of club or organization: No     Attends meetings of clubs or organizations: Never     Relationship status:    Other Topics Concern    Not on file   Social History Narrative    Not on file       MEDICATIONS & ALLERGIES:     Current Outpatient Medications on File Prior to Visit   Medication Sig Dispense Refill           DULoxetine (CYMBALTA) 60 MG capsule Take 1 capsule (60 mg total) by mouth 2 (two) times daily. 180 capsule 2    fluticasone (FLONASE) 50 mcg/actuation nasal spray 1 spray (50 mcg total) by Each Nare route 2 (two) times daily. 1 Bottle 3    ibuprofen (ADVIL,MOTRIN) 800 MG tablet Take 800 mg by mouth as needed for Pain.       methadone HCl (METHADONE ORAL) Take 105 mg by mouth once daily.       omeprazole (PRILOSEC) 40 MG capsule  TAKE 1 CAPSULE(40 MG) BY MOUTH EVERY DAY 90 capsule 0     No current facility-administered medications on file prior to visit.         Review of patient's allergies indicates:  No Known Allergies    Medications Reconciliation:   I have reconciled the patient's home medications and discharge medications with the patient/family. I have updated all changes.  Refer to After-Visit Medication List.    OBJECTIVE:     Vital Signs:  Vitals:    11/04/19 1508   BP: (!) 140/92   Pulse: 94     Wt Readings from Last 1 Encounters:   11/04/19 1508 90.7 kg (199 lb 15.3 oz)     Body mass index is 39.05 kg/m².     Physical Exam:        General: Well developed, well nourished. No distress.  HEENT: Head is normocephalic, atraumatic   Eyes: Clear conjunctiva.  Neck: Supple, symmetrical neck; trachea midline.  Lungs: Clear to auscultation bilaterally and normal respiratory effort.  Cardiovascular: Heart with regular rate and rhythm.    Abdomen: Abdomen is soft, non-tender non-distended with normal bowel sounds.  Skin: Skin color, texture, turgor normal. No rashes.  Musculoskeletal: Normal gait.   Psychiatric: Normal affect. Alert.    Laboratory  Lab Results   Component Value Date    WBC 9.5 04/25/2011    HGB 12.9 04/25/2011    HCT 37.5 04/25/2011     04/25/2011    CHOL 164 03/10/2016    TRIG 67 03/10/2016    HDL 63 03/10/2016    ALT 18 05/31/2018    AST 21 05/31/2018     05/31/2018    K 4.2 05/31/2018     05/31/2018    CREATININE 0.8 05/31/2018    BUN 14 05/31/2018    CO2 30 (H) 05/31/2018    TSH 1.129 06/14/2016    INR 1.0 04/25/2011       ASSESSMENT & PLAN:     Herpes genitalis in women  Herpes gingivostomatitis  - Rx chronic suppression treatment:  -     valACYclovir (VALTREX) 500 MG tablet; Take 1 tablet (500 mg total) by mouth once daily.  Dispense: 90 tablet; Refill: 1    Flat back syndrome, postprocedural  Chronic pain syndrome  - Receives Methadone from local behavioral health clinic.    Will refill her Cymbalta  until established with PCP.    Muscle relaxant for recent pulled right shoulder muscle.    Rx:  -     DULoxetine (CYMBALTA) 60 MG capsule; Take 1 capsule (60 mg total) by mouth 2 (two) times daily.  Dispense: 180 capsule; Refill: 1  -     tiZANidine (ZANAFLEX) 4 MG tablet; Take 1 tablet (4 mg total) by mouth every 8 (eight) hours as needed.  Dispense: 60 tablet; Refill: 0    Scheduled Follow-up :  Future Appointments   Date Time Provider Department Center   12/2/2019  9:00 AM Domi Thompson MD [facility] IM [facility]   2/18/2020  8:00 AM Oscar Ceballos III, NP [facility] PSYCH [facility]       After Visit Medication List :     Medication List           Accurate as of November 4, 2019  3:33 PM. If you have any questions, ask your nurse or doctor.               START taking these medications    tiZANidine 4 MG tablet  Commonly known as:  ZANAFLEX  Take 1 tablet (4 mg total) by mouth every 8 (eight) hours as needed.  Started by:  Alex Salazar MD     valACYclovir 500 MG tablet  Commonly known as:  VALTREX  Take 1 tablet (500 mg total) by mouth once daily.  Started by:  Alex Salazar MD        CONTINUE taking these medications    DULoxetine 60 MG capsule  Commonly known as:  CYMBALTA  Take 1 capsule (60 mg total) by mouth 2 (two) times daily.     fluticasone propionate 50 mcg/actuation nasal spray  Commonly known as:  FLONASE  1 spray (50 mcg total) by Each Nare route 2 (two) times daily.     ibuprofen 800 MG tablet  Commonly known as:  ADVIL,MOTRIN     METHADONE ORAL     omeprazole 40 MG capsule  Commonly known as:  PRILOSEC  TAKE 1 CAPSULE(40 MG) BY MOUTH EVERY DAY        STOP taking these medications    albuterol 90 mcg/actuation inhaler  Commonly known as:  PROVENTIL/VENTOLIN HFA  Stopped by:  Alex Salazar MD           Where to Get Your Medications      These medications were sent to [id number] - [facility] [address] AT [facility]  [address], [place]  [address]    Phone:  [phone]   · DULoxetine 60 MG capsule  · tiZANidine 4 MG tablet  · valACYclovir 500 MG tablet         Signing Physician:  Alex Salazar MD

## 2020-02-13 DIAGNOSIS — K21.9 GASTROESOPHAGEAL REFLUX DISEASE, ESOPHAGITIS PRESENCE NOT SPECIFIED: ICD-10-CM

## 2020-02-13 NOTE — TELEPHONE ENCOUNTER
Last Office Visit Info:   The patient's last visit with No primary care provider on file. was on Visit date not found.    The patient's last visit in current department was on Visit date not found.        Last CBC Results:   Lab Results   Component Value Date    WBC 9.5 04/25/2011    HGB 12.9 04/25/2011    HCT 37.5 04/25/2011     04/25/2011       Last CMP Results  Lab Results   Component Value Date     05/31/2018    K 4.2 05/31/2018     05/31/2018    CO2 30 (H) 05/31/2018    BUN 14 05/31/2018    CREATININE 0.8 05/31/2018    CALCIUM 9.2 05/31/2018    ALBUMIN 3.7 05/31/2018    AST 21 05/31/2018    ALT 18 05/31/2018       Last Lipids  Lab Results   Component Value Date    CHOL 164 03/10/2016    TRIG 67 03/10/2016    HDL 63 03/10/2016    LDLCALC 87.6 03/10/2016       Last A1C  No results found for: HGBA1C    Last TSH  Lab Results   Component Value Date    TSH 1.129 06/14/2016         Current Med Refills  Medication List with Changes/Refills   Current Medications    DULOXETINE (CYMBALTA) 60 MG CAPSULE    Take 1 capsule (60 mg total) by mouth 2 (two) times daily.       Start Date: 11/4/2019 End Date: --    FLUTICASONE (FLONASE) 50 MCG/ACTUATION NASAL SPRAY    1 spray (50 mcg total) by Each Nare route 2 (two) times daily.       Start Date: 9/26/2018 End Date: --    IBUPROFEN (ADVIL,MOTRIN) 800 MG TABLET    Take 800 mg by mouth as needed for Pain.        Start Date: --        End Date: --    METHADONE HCL (METHADONE ORAL)    Take 105 mg by mouth once daily.        Start Date: --        End Date: --    OMEPRAZOLE (PRILOSEC) 40 MG CAPSULE    TAKE 1 CAPSULE(40 MG) BY MOUTH EVERY DAY       Start Date: 10/29/2019End Date: --    TIZANIDINE (ZANAFLEX) 4 MG TABLET    Take 1 tablet (4 mg total) by mouth every 8 (eight) hours as needed.       Start Date: 11/4/2019 End Date: --    VALACYCLOVIR (VALTREX) 500 MG TABLET    Take 1 tablet (500 mg total) by mouth once daily.       Start Date: 11/4/2019 End Date: --        Order(s) placed per written order guidelines:     Please advise.

## 2020-02-16 RX ORDER — OMEPRAZOLE 40 MG/1
CAPSULE, DELAYED RELEASE ORAL
Qty: 90 CAPSULE | Refills: 0 | Status: SHIPPED | OUTPATIENT
Start: 2020-02-16 | End: 2020-02-21 | Stop reason: SDUPTHER

## 2020-02-21 ENCOUNTER — OFFICE VISIT (OUTPATIENT)
Dept: INTERNAL MEDICINE | Facility: CLINIC | Age: 60
End: 2020-02-21
Payer: COMMERCIAL

## 2020-02-21 VITALS
HEART RATE: 84 BPM | SYSTOLIC BLOOD PRESSURE: 128 MMHG | DIASTOLIC BLOOD PRESSURE: 80 MMHG | HEIGHT: 60 IN | WEIGHT: 198 LBS | BODY MASS INDEX: 38.87 KG/M2 | OXYGEN SATURATION: 96 %

## 2020-02-21 DIAGNOSIS — F32.A DEPRESSION, UNSPECIFIED DEPRESSION TYPE: ICD-10-CM

## 2020-02-21 DIAGNOSIS — R20.2 PARESTHESIA OF BOTH LOWER EXTREMITIES: ICD-10-CM

## 2020-02-21 DIAGNOSIS — G89.29 CHRONIC BILATERAL LOW BACK PAIN, UNSPECIFIED WHETHER SCIATICA PRESENT: ICD-10-CM

## 2020-02-21 DIAGNOSIS — Z00.00 HEALTHCARE MAINTENANCE: ICD-10-CM

## 2020-02-21 DIAGNOSIS — Z83.3 FAMILY HISTORY OF DIABETES MELLITUS (DM): ICD-10-CM

## 2020-02-21 DIAGNOSIS — K21.9 GASTROESOPHAGEAL REFLUX DISEASE, ESOPHAGITIS PRESENCE NOT SPECIFIED: ICD-10-CM

## 2020-02-21 DIAGNOSIS — R29.91 ABNORMAL FOOT FINDING: ICD-10-CM

## 2020-02-21 DIAGNOSIS — G89.29 CHRONIC NECK PAIN: ICD-10-CM

## 2020-02-21 DIAGNOSIS — G89.4 CHRONIC PAIN SYNDROME: ICD-10-CM

## 2020-02-21 DIAGNOSIS — R26.81 GAIT INSTABILITY: ICD-10-CM

## 2020-02-21 DIAGNOSIS — Z00.00 ANNUAL PHYSICAL EXAM: Primary | ICD-10-CM

## 2020-02-21 DIAGNOSIS — H53.8 BLURRY VISION, BILATERAL: ICD-10-CM

## 2020-02-21 DIAGNOSIS — Z72.0 TOBACCO ABUSE: ICD-10-CM

## 2020-02-21 DIAGNOSIS — M54.2 CHRONIC NECK PAIN: ICD-10-CM

## 2020-02-21 DIAGNOSIS — M54.50 CHRONIC BILATERAL LOW BACK PAIN, UNSPECIFIED WHETHER SCIATICA PRESENT: ICD-10-CM

## 2020-02-21 PROCEDURE — 99999 PR PBB SHADOW E&M-EST. PATIENT-LVL V: CPT | Mod: PBBFAC,,, | Performed by: STUDENT IN AN ORGANIZED HEALTH CARE EDUCATION/TRAINING PROGRAM

## 2020-02-21 PROCEDURE — 99999 PR PBB SHADOW E&M-EST. PATIENT-LVL V: ICD-10-PCS | Mod: PBBFAC,,, | Performed by: STUDENT IN AN ORGANIZED HEALTH CARE EDUCATION/TRAINING PROGRAM

## 2020-02-21 PROCEDURE — 99396 PREV VISIT EST AGE 40-64: CPT | Mod: S$GLB,,, | Performed by: STUDENT IN AN ORGANIZED HEALTH CARE EDUCATION/TRAINING PROGRAM

## 2020-02-21 PROCEDURE — 99396 PR PREVENTIVE VISIT,EST,40-64: ICD-10-PCS | Mod: S$GLB,,, | Performed by: STUDENT IN AN ORGANIZED HEALTH CARE EDUCATION/TRAINING PROGRAM

## 2020-02-21 RX ORDER — DULOXETIN HYDROCHLORIDE 60 MG/1
60 CAPSULE, DELAYED RELEASE ORAL 2 TIMES DAILY
Qty: 180 CAPSULE | Refills: 1 | Status: SHIPPED | OUTPATIENT
Start: 2020-02-21 | End: 2020-11-05 | Stop reason: SDUPTHER

## 2020-02-21 RX ORDER — GABAPENTIN 100 MG/1
100 CAPSULE ORAL 3 TIMES DAILY
Qty: 90 CAPSULE | Refills: 2 | Status: SHIPPED | OUTPATIENT
Start: 2020-02-21 | End: 2021-12-14

## 2020-02-21 RX ORDER — OMEPRAZOLE 40 MG/1
CAPSULE, DELAYED RELEASE ORAL
Qty: 90 CAPSULE | Refills: 0 | Status: SHIPPED | OUTPATIENT
Start: 2020-02-21 | End: 2020-05-14 | Stop reason: SDUPTHER

## 2020-02-21 NOTE — PROGRESS NOTES
"Subjective:       Patient ID: Chanelle Downey is a 60 y.o. female.    Chief Complaint: Establish Care and Medication Refill    HPI   61yo F w/ PMH GERD and chronic LBP s/p lumbar discectomy 2002 and L4-S1 laminectomy and fusion 2010 with post-procedural chronic pain who presents to establish care and medication refills.     Pt noted to have flat back syndrome when evaluated by neurology in Jan 2018 for worsening gait, LBP, LLE weakness, and RLE neuropathic. PT and consult to NSGY was ordered. Evaluated by NSGY May 2018:   "because of the age of her films, I will repeat a scoliosis series and an MRI of her lumbar spine without contrast, flexion/extension x-rays of the lumbar spine. Patient also had L2-3 spondylolisthesis with dynamic instability. At this point, I think that she will need at least T10 to ileum fusion, but we will make the final planning depending on the new imaging and how much correction we would need to achieve (patient may need ALIF or PSOs, depending on today's films)."   Imaging was completed, however, Pt lost to follow-up. Today, Pt notes not desiring to "go under the knife" due to fear from complications of initial surgeries and present disability. PT has not been pursued recently. Paresthesias include BL LE now as well as BL UE. Pt notes 2 falls at the beginning of the month due to balance issues; denies presyncopal symptoms, CP. She is presently using a cane. 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     Pain regimen presently includes:   Methadone 105mg daily  Ibuprofen up to 800mg daily    Last seen by psychiatry May 2016 for MDD, PTSD, VIKA.  At the time Cymbalta increased to 90 mg PO daily from 60 and gabapentin 300mg TID initiated. Pt has not been taking gabapentin; unclear why and if discontinued.     Poor social support noted. Pt has one schizophrenic son who is not reliable. This she notes is why she has missed c-scope appts as no one has been available " to be with her during the procedure.     She continues to smoke.    Diet is poor.     Exercise is limited by chronic pain, foot deformity, SOB.    Review of Systems   Constitutional: Negative for chills and fever.   HENT: Negative for congestion, sneezing, sore throat and trouble swallowing.    Respiratory: Positive for shortness of breath (w/ exertion). Negative for cough, wheezing and stridor.    Cardiovascular: Positive for palpitations and leg swelling. Negative for chest pain.   Gastrointestinal: Negative for abdominal pain, blood in stool, constipation, diarrhea, nausea and vomiting.   Genitourinary: Negative for dysuria and hematuria.   Musculoskeletal: Positive for arthralgias, back pain, gait problem, myalgias and neck pain. Negative for joint swelling.   Skin: Negative for rash and wound.   Neurological: Positive for numbness and headaches. Negative for dizziness, tremors, weakness and light-headedness.         Past Medical History:   Diagnosis Date    Back pain, chronic     Biliary colic 2018    Calculus of gallbladder without cholecystitis without obstruction 2018    Cervical radiculopathy 3/21/2016    01/17/18--clinic visit, pt describes intermittent BUE numbness/tingling throughout entire arm/hand.  Negative Spurling's test bilaterally.    Cervical stenosis of spinal canal 3/21/2016    Degenerative cervical spinal stenosis 2/10/2016    Depression     Flat back syndrome, postprocedural 2018    Dx 10/2016 when seen in NSGY clinic.  Presents to Neurology clinic 18 for worsening gait, LBP, LLE weakness, and RLE neuropathic pain.    HSV (herpes simplex virus) anogenital infection 2016     Past Surgical History:   Procedure Laterality Date    APPENDECTOMY       SECTION      EYE SURGERY      For strabismus    HYSTERECTOMY      LAPAROSCOPIC CHOLECYSTECTOMY N/A 2018    Procedure: CHOLECYSTECTOMY, LAPAROSCOPIC ;  Surgeon: Yonatan Berry MD;  Location:  [facility] OR 2ND FLR;  Service: General;  Laterality: N/A;    LUMBAR DISCECTOMY  2002    Unknown vertebra    LUMBAR FUSION  2010    L4-S1 fusion reported    TONSILLECTOMY       Social History     Socioeconomic History    Marital status:      Spouse name: Not on file    Number of children: Not on file    Years of education: Not on file    Highest education level: Not on file   Occupational History    Occupation: LPN   Social Needs    Financial resource strain: Somewhat hard    Food insecurity:     Worry: Often true     Inability: Often true    Transportation needs:     Medical: No     Non-medical: No   Tobacco Use    Smoking status: Current Every Day Smoker     Packs/day: 0.50     Types: Cigarettes    Smokeless tobacco: Current User    Tobacco comment: 10-15 cig/24 hrs since 40   Substance and Sexual Activity    Alcohol use: Yes     Alcohol/week: 0.0 standard drinks     Frequency: Never     Binge frequency: Never    Drug use: No    Sexual activity: Not Currently     Partners: Male   Lifestyle    Physical activity:     Days per week: 4 days     Minutes per session: Not on file    Stress: Not on file   Relationships    Social connections:     Talks on phone: Never     Gets together: Never     Attends Mandaeism service: Not on file     Active member of club or organization: No     Attends meetings of clubs or organizations: Never     Relationship status:    Other Topics Concern    Not on file   Social History Narrative    Not on file     Review of patient's allergies indicates:  No Known Allergies    Current Outpatient Medications on File Prior to Visit   Medication Sig Dispense Refill Last Dose    ibuprofen (ADVIL,MOTRIN) 800 MG tablet Take 800 mg by mouth as needed for Pain.    Taking    methadone HCl (METHADONE ORAL) Take 105 mg by mouth once daily.    Taking    valACYclovir (VALTREX) 500 MG tablet Take 1 tablet (500 mg total) by mouth once daily. 90 tablet 1      Medications have  been reviewed and reconciled.    Objective:       Vitals:    02/21/20 1038   BP: 128/80   BP Location: Left arm   Patient Position: Sitting   BP Method: Large (Manual)   Pulse: 84   SpO2: 96%   Weight: 89.8 kg (197 lb 15.6 oz)   Height: 5' (1.524 m)       Physical Exam   Constitutional: She is oriented to person, place, and time. She appears well-developed. No distress.   HENT:   Head: Normocephalic.   Mouth/Throat: Oropharynx is clear and moist.   Eyes: Conjunctivae are normal. No scleral icterus.   Neck: Normal range of motion. Neck supple.   Cardiovascular: Normal rate, regular rhythm and intact distal pulses.   Murmur heard.  Pulmonary/Chest: Effort normal and breath sounds normal. She has no wheezes.   Abdominal: Soft. Bowel sounds are normal. There is no tenderness.   Musculoskeletal:   TTP cervical and lumbar spine  Large joint ROM intact, however, limited by pain at BL knees and shoulders  Patient walks bent ~ 45 degrees forward at the waist, antalgic gait otherwise where pt swings LLE with stiff RLE.  L foot deformity.   Lymphadenopathy:     She has no cervical adenopathy.   Neurological: She is alert and oriented to person, place, and time. She has normal strength. She displays no atrophy. No cranial nerve deficit or sensory deficit. She exhibits normal muscle tone. Coordination and gait abnormal.   Strength intact R>L   Skin: Skin is warm and dry. She is not diaphoretic.   Psychiatric: She has a normal mood and affect.   Nursing note and vitals reviewed.      Assessment:       1. Annual physical exam    2. Chronic pain syndrome    3. Gastroesophageal reflux disease, esophagitis presence not specified    4. Chronic neck pain    5. Chronic bilateral low back pain, unspecified whether sciatica present    6. Healthcare maintenance    7. Family history of diabetes mellitus (DM)    8. Depression, unspecified depression type    9. Paresthesia of both lower extremities    10. Tobacco abuse    11. Blurry vision,  bilateral    12. Gait instability    13. Abnormal foot finding        Plan:       Chanelle was seen today for establish care and medication refill.    Diagnoses and all orders for this visit:    Annual physical exam  Healthcare maintenance  Overall, high fall risk. Decrease fall RFs as Pt does not wish to proceed with further surgery. Close follow-up.  -     CBC auto differential; Future  -     Comprehensive metabolic panel; Future  -     Lipid panel; Future  -     Case request GI: COLONOSCOPY  -     Mammo Digital Screening Bilateral With CAD; Future  -     Fecal Immunochemical Test (iFOBT); Future - as alternative if Pt unable to complete c-scope due to poor social support  Pt denies vaccinations.     Chronic pain syndrome  Chronic neck pain  Chronic bilateral low back pain, unspecified whether sciatica present  Gait instability  Overall, high fall risk. Decrease fall RFs as Pt does not wish to proceed with further surgery.   -     DULoxetine (CYMBALTA) 60 MG capsule; Take 1 capsule (60 mg total) by mouth 2 (two) times daily.  -     Ambulatory referral/consult to Physical Therapy; Future  -     Ambulatory referral/consult to [facility]; Future    Gastroesophageal reflux disease, esophagitis presence not specified  Continue PPI in setting of high NSAID use.  -     omeprazole (PRILOSEC) 40 MG capsule; TAKE 1 CAPSULE(40 MG) BY MOUTH EVERY DAY  -     CBC auto differential; Future  -     Comprehensive metabolic panel; Future  -     Vitamin D; Future    Family history of diabetes mellitus (DM)  -     Comprehensive metabolic panel; Future  -     Lipid panel; Future  -     Hemoglobin A1c; Future    Depression, unspecified depression type  Eval further at f/u. Continue Cymbalta.   -     DULoxetine (CYMBALTA) 60 MG capsule; Take 1 capsule (60 mg total) by mouth 2 (two) times daily.    Paresthesia of both lower extremities  W/u for metabolic etiologies as contributing or primary factors. Continue duloxetine. As poor  control, initiate gabapentin.   -     DULoxetine (CYMBALTA) 60 MG capsule; Take 1 capsule (60 mg total) by mouth 2 (two) times daily.  -     gabapentin (NEURONTIN) 100 MG capsule; Take 1 capsule (100 mg total) by mouth 3 (three) times daily.  -     Vitamin B12; Future  -     Magnesium; Future  -     Folate; Future  -     Vitamin D; Future    Tobacco abuse  Pt expresses desire to quit.  -     Ambulatory referral/consult to Smoking Cessation Program; Future    Blurry vision, bilateral  Chronic issues, wears non-prescription otc magnification glasses presently. Plan to optimize vision to decrease fall risk.   -     Ambulatory referral/consult to Optometry; Future    Gait instability  Overall, high fall risk. Decrease fall RFs as Pt does not wish to proceed with further surgery.   -     Ambulatory referral/consult to Physical Therapy; Future  -     Ambulatory referral/consult to [facility]; Future  -     Ambulatory referral/consult to Podiatry; Future    Abnormal foot finding  Gait instability  Eval by podiatry for orthotics or shoes to held decrease fall risk.   -     Ambulatory referral/consult to Podiatry; Future    RTC 3 months for follow-up and care coordination.     Pt case d/w Dr. Burleson.

## 2020-02-21 NOTE — PROGRESS NOTES
Reviewed patient's medical record in Epic. Patient's history and physical reviewed and discussed, please refer to resident physician's note for specific details. I agree with resident's assessment and plan.    Tad Burleson MD

## 2020-02-23 PROBLEM — K21.9 GASTROESOPHAGEAL REFLUX DISEASE: Status: ACTIVE | Noted: 2020-02-23

## 2020-02-23 PROBLEM — Z72.0 TOBACCO ABUSE: Status: ACTIVE | Noted: 2020-02-23

## 2020-02-23 PROBLEM — F32.A DEPRESSION: Status: ACTIVE | Noted: 2020-02-23

## 2020-02-23 PROBLEM — R26.81 GAIT INSTABILITY: Status: ACTIVE | Noted: 2020-02-23

## 2020-02-23 PROBLEM — R20.2 PARESTHESIA OF BOTH LOWER EXTREMITIES: Status: ACTIVE | Noted: 2020-02-23

## 2020-05-14 DIAGNOSIS — K21.9 GASTROESOPHAGEAL REFLUX DISEASE, ESOPHAGITIS PRESENCE NOT SPECIFIED: ICD-10-CM

## 2020-05-14 RX ORDER — OMEPRAZOLE 40 MG/1
CAPSULE, DELAYED RELEASE ORAL
Qty: 90 CAPSULE | Refills: 0 | OUTPATIENT
Start: 2020-05-14

## 2020-05-14 RX ORDER — OMEPRAZOLE 40 MG/1
CAPSULE, DELAYED RELEASE ORAL
Qty: 90 CAPSULE | Refills: 0 | Status: SHIPPED | OUTPATIENT
Start: 2020-05-14 | End: 2020-11-09 | Stop reason: SDUPTHER

## 2020-05-14 NOTE — TELEPHONE ENCOUNTER
----- Message from Davina Joyce sent at 5/14/2020  4:24 PM CDT -----  Contact: Pt   Type:  RX Refill Request  Who Called: Chanelle  Refill or New Rx: omeprazole (PRILOSEC) 40 MG capsule    How is the patient currently taking it? (ex. 1XDay): 1 a day   Is this a 30 day or 90 day RX: 90     Preferred Pharmacy with phone number: [id number] - [place], [place] - [address] AT [facility]  Phone: [phone]    Ordering Provider: Dr. Domi Thompson   Would the patient rather a call back or a response via [facility]?  Call back   Best Call Back Number:[phone]    Additional Information: Pt needs it as soon as possible she has 3 more days left. Pt would like a call back as well

## 2020-05-19 DIAGNOSIS — A60.09 HERPES GENITALIS IN WOMEN: ICD-10-CM

## 2020-05-19 DIAGNOSIS — B00.2 HERPES GINGIVOSTOMATITIS: ICD-10-CM

## 2020-05-23 RX ORDER — VALACYCLOVIR HYDROCHLORIDE 500 MG/1
500 TABLET, FILM COATED ORAL DAILY
Qty: 90 TABLET | Refills: 1 | Status: SHIPPED | OUTPATIENT
Start: 2020-05-23 | End: 2020-11-30 | Stop reason: SDUPTHER

## 2020-11-05 DIAGNOSIS — R20.2 PARESTHESIA OF BOTH LOWER EXTREMITIES: ICD-10-CM

## 2020-11-05 DIAGNOSIS — F32.A DEPRESSION, UNSPECIFIED DEPRESSION TYPE: ICD-10-CM

## 2020-11-05 DIAGNOSIS — G89.4 CHRONIC PAIN SYNDROME: ICD-10-CM

## 2020-11-05 RX ORDER — DULOXETIN HYDROCHLORIDE 60 MG/1
60 CAPSULE, DELAYED RELEASE ORAL 2 TIMES DAILY
Qty: 180 CAPSULE | Refills: 1 | Status: SHIPPED | OUTPATIENT
Start: 2020-11-05 | End: 2020-11-09 | Stop reason: SDUPTHER

## 2020-11-09 DIAGNOSIS — R20.2 PARESTHESIA OF BOTH LOWER EXTREMITIES: ICD-10-CM

## 2020-11-09 DIAGNOSIS — F32.A DEPRESSION, UNSPECIFIED DEPRESSION TYPE: ICD-10-CM

## 2020-11-09 DIAGNOSIS — K21.9 GASTROESOPHAGEAL REFLUX DISEASE: ICD-10-CM

## 2020-11-09 DIAGNOSIS — G89.4 CHRONIC PAIN SYNDROME: ICD-10-CM

## 2020-11-09 RX ORDER — DULOXETIN HYDROCHLORIDE 60 MG/1
60 CAPSULE, DELAYED RELEASE ORAL 2 TIMES DAILY
Qty: 180 CAPSULE | Refills: 1 | Status: SHIPPED | OUTPATIENT
Start: 2020-11-09 | End: 2021-12-21 | Stop reason: SDUPTHER

## 2020-11-09 RX ORDER — OMEPRAZOLE 40 MG/1
CAPSULE, DELAYED RELEASE ORAL
Qty: 90 CAPSULE | Refills: 0 | Status: SHIPPED | OUTPATIENT
Start: 2020-11-09 | End: 2020-11-30 | Stop reason: SDUPTHER

## 2020-11-30 ENCOUNTER — TELEPHONE (OUTPATIENT)
Dept: NEUROLOGY | Facility: CLINIC | Age: 60
End: 2020-11-30

## 2020-11-30 DIAGNOSIS — K21.9 GASTROESOPHAGEAL REFLUX DISEASE: ICD-10-CM

## 2020-11-30 DIAGNOSIS — A60.09 HERPES GENITALIS IN WOMEN: ICD-10-CM

## 2020-11-30 DIAGNOSIS — B00.2 HERPES GINGIVOSTOMATITIS: ICD-10-CM

## 2020-11-30 NOTE — TELEPHONE ENCOUNTER
----- Message from Valerie Davidson sent at 11/27/2020 10:19 AM CST -----  Regarding: appt access  Contact: pt  Pt would like to schedule appt for neck pain, numbness in arm, hands, fingers. I attempted to schedule appt but was unable to. Pt can be reached at [phone]

## 2020-11-30 NOTE — TELEPHONE ENCOUNTER
----- Message from Valerie Davidson sent at 11/27/2020 10:11 AM CST -----  Regarding: refills  Contact: pt  Pt requests refills:    omeprazole (PRILOSEC) 40 MG capsule 90 capsule 0 11/9/2020  No  Sig: TAKE 1 CAPSULE(40 MG) BY MOUTH EVERY DAY  Sent to pharmacy as: omeprazole (PRILOSEC) 40 MG capsule    valACYclovir (VALTREX) 500 MG tablet 90 tablet 1 5/23/2020  No  Sig - Route: Take 1 tablet (500 mg total) by mouth once daily. - Oral  Sent to pharmacy as: valACYclovir (VALTREX) 500 MG tablet    [id number] - [place], [place] - [address] AT [facility]  Telephone Fax  [phone]    Pt can be reached at [phone]

## 2020-12-01 RX ORDER — OMEPRAZOLE 40 MG/1
CAPSULE, DELAYED RELEASE ORAL
Qty: 90 CAPSULE | Refills: 0 | Status: SHIPPED | OUTPATIENT
Start: 2020-12-01 | End: 2021-12-21 | Stop reason: SDUPTHER

## 2020-12-01 RX ORDER — VALACYCLOVIR HYDROCHLORIDE 500 MG/1
500 TABLET, FILM COATED ORAL DAILY
Qty: 90 TABLET | Refills: 1 | Status: SHIPPED | OUTPATIENT
Start: 2020-12-01 | End: 2021-12-21 | Stop reason: SDUPTHER

## 2021-02-10 ENCOUNTER — OFFICE VISIT (OUTPATIENT)
Dept: SPINE | Facility: CLINIC | Age: 61
End: 2021-02-10
Attending: PHYSICAL MEDICINE & REHABILITATION
Payer: COMMERCIAL

## 2021-02-10 VITALS
HEIGHT: 60 IN | WEIGHT: 198 LBS | HEART RATE: 62 BPM | BODY MASS INDEX: 38.87 KG/M2 | DIASTOLIC BLOOD PRESSURE: 77 MMHG | SYSTOLIC BLOOD PRESSURE: 137 MMHG

## 2021-02-10 DIAGNOSIS — G89.4 CHRONIC PAIN SYNDROME: ICD-10-CM

## 2021-02-10 DIAGNOSIS — M79.2 NEURALGIA AND NEURITIS: ICD-10-CM

## 2021-02-10 DIAGNOSIS — M47.22 OSTEOARTHRITIS OF SPINE WITH RADICULOPATHY, CERVICAL REGION: Primary | ICD-10-CM

## 2021-02-10 DIAGNOSIS — M40.30 FLAT BACK SYNDROME, POSTPROCEDURAL: ICD-10-CM

## 2021-02-10 PROCEDURE — 99999 PR PBB SHADOW E&M-EST. PATIENT-LVL III: CPT | Mod: PBBFAC,,, | Performed by: PHYSICAL MEDICINE & REHABILITATION

## 2021-02-10 PROCEDURE — 99204 OFFICE O/P NEW MOD 45 MIN: CPT | Mod: S$GLB,,, | Performed by: PHYSICAL MEDICINE & REHABILITATION

## 2021-02-10 PROCEDURE — 99204 PR OFFICE/OUTPT VISIT, NEW, LEVL IV, 45-59 MIN: ICD-10-PCS | Mod: S$GLB,,, | Performed by: PHYSICAL MEDICINE & REHABILITATION

## 2021-02-10 PROCEDURE — 99999 PR PBB SHADOW E&M-EST. PATIENT-LVL III: ICD-10-PCS | Mod: PBBFAC,,, | Performed by: PHYSICAL MEDICINE & REHABILITATION

## 2021-02-10 RX ORDER — DIAZEPAM 2 MG/1
2 TABLET ORAL ONCE AS NEEDED
Qty: 2 TABLET | Refills: 0 | Status: SHIPPED | OUTPATIENT
Start: 2021-02-10 | End: 2021-12-14

## 2021-04-16 ENCOUNTER — PATIENT MESSAGE (OUTPATIENT)
Dept: RESEARCH | Facility: HOSPITAL | Age: 61
End: 2021-04-16

## 2021-07-09 ENCOUNTER — OFFICE VISIT (OUTPATIENT)
Dept: INTERNAL MEDICINE | Facility: CLINIC | Age: 61
End: 2021-07-09
Payer: COMMERCIAL

## 2021-07-09 VITALS
HEIGHT: 60 IN | OXYGEN SATURATION: 97 % | WEIGHT: 199.31 LBS | BODY MASS INDEX: 39.13 KG/M2 | SYSTOLIC BLOOD PRESSURE: 128 MMHG | TEMPERATURE: 99 F | DIASTOLIC BLOOD PRESSURE: 76 MMHG | HEART RATE: 86 BPM

## 2021-07-09 DIAGNOSIS — L03.116 CELLULITIS OF LEFT LOWER EXTREMITY: Primary | ICD-10-CM

## 2021-07-09 PROCEDURE — 99999 PR PBB SHADOW E&M-EST. PATIENT-LVL IV: CPT | Mod: PBBFAC,,, | Performed by: INTERNAL MEDICINE

## 2021-07-09 PROCEDURE — 99999 PR PBB SHADOW E&M-EST. PATIENT-LVL IV: ICD-10-PCS | Mod: PBBFAC,,, | Performed by: INTERNAL MEDICINE

## 2021-07-09 PROCEDURE — 99213 PR OFFICE/OUTPT VISIT, EST, LEVL III, 20-29 MIN: ICD-10-PCS | Mod: S$GLB,,, | Performed by: INTERNAL MEDICINE

## 2021-07-09 PROCEDURE — 99213 OFFICE O/P EST LOW 20 MIN: CPT | Mod: S$GLB,,, | Performed by: INTERNAL MEDICINE

## 2021-07-09 RX ORDER — CEPHALEXIN 500 MG/1
500 CAPSULE ORAL EVERY 6 HOURS
Qty: 40 CAPSULE | Refills: 0 | Status: SHIPPED | OUTPATIENT
Start: 2021-07-09 | End: 2021-12-14

## 2021-07-19 ENCOUNTER — PATIENT MESSAGE (OUTPATIENT)
Dept: ADMINISTRATIVE | Facility: OTHER | Age: 61
End: 2021-07-19

## 2021-07-19 ENCOUNTER — PATIENT OUTREACH (OUTPATIENT)
Dept: ADMINISTRATIVE | Facility: OTHER | Age: 61
End: 2021-07-19

## 2021-07-19 DIAGNOSIS — Z12.11 COLON CANCER SCREENING: ICD-10-CM

## 2021-07-19 DIAGNOSIS — Z12.31 BREAST CANCER SCREENING BY MAMMOGRAM: Primary | ICD-10-CM

## 2021-12-14 DIAGNOSIS — F32.A DEPRESSION, UNSPECIFIED DEPRESSION TYPE: ICD-10-CM

## 2021-12-14 DIAGNOSIS — R20.2 PARESTHESIA OF BOTH LOWER EXTREMITIES: ICD-10-CM

## 2021-12-14 DIAGNOSIS — G89.4 CHRONIC PAIN SYNDROME: ICD-10-CM

## 2021-12-14 RX ORDER — DULOXETIN HYDROCHLORIDE 60 MG/1
60 CAPSULE, DELAYED RELEASE ORAL 2 TIMES DAILY
Qty: 180 CAPSULE | Refills: 1 | Status: CANCELLED | OUTPATIENT
Start: 2021-12-14

## 2021-12-15 ENCOUNTER — TELEPHONE (OUTPATIENT)
Dept: INTERNAL MEDICINE | Facility: CLINIC | Age: 61
End: 2021-12-15
Payer: COMMERCIAL

## 2021-12-21 ENCOUNTER — OFFICE VISIT (OUTPATIENT)
Dept: INTERNAL MEDICINE | Facility: CLINIC | Age: 61
End: 2021-12-21
Payer: COMMERCIAL

## 2021-12-21 ENCOUNTER — LAB VISIT (OUTPATIENT)
Dept: LAB | Facility: HOSPITAL | Age: 61
End: 2021-12-21
Payer: COMMERCIAL

## 2021-12-21 VITALS
BODY MASS INDEX: 36.09 KG/M2 | WEIGHT: 196.13 LBS | OXYGEN SATURATION: 96 % | DIASTOLIC BLOOD PRESSURE: 82 MMHG | HEIGHT: 62 IN | SYSTOLIC BLOOD PRESSURE: 140 MMHG | HEART RATE: 81 BPM

## 2021-12-21 DIAGNOSIS — Z00.00 VISIT FOR ANNUAL HEALTH EXAMINATION: Primary | ICD-10-CM

## 2021-12-21 DIAGNOSIS — M48.02 DEGENERATIVE CERVICAL SPINAL STENOSIS: ICD-10-CM

## 2021-12-21 DIAGNOSIS — F17.210 CIGARETTE NICOTINE DEPENDENCE WITHOUT COMPLICATION: ICD-10-CM

## 2021-12-21 DIAGNOSIS — F32.A DEPRESSION, UNSPECIFIED DEPRESSION TYPE: ICD-10-CM

## 2021-12-21 DIAGNOSIS — R03.0 ELEVATED BP WITHOUT DIAGNOSIS OF HYPERTENSION: ICD-10-CM

## 2021-12-21 DIAGNOSIS — F11.20 METHADONE DEPENDENCE: ICD-10-CM

## 2021-12-21 DIAGNOSIS — M54.2 CHRONIC NECK PAIN: ICD-10-CM

## 2021-12-21 DIAGNOSIS — B00.2 HERPES GINGIVOSTOMATITIS: ICD-10-CM

## 2021-12-21 DIAGNOSIS — K21.9 GASTROESOPHAGEAL REFLUX DISEASE WITHOUT ESOPHAGITIS: ICD-10-CM

## 2021-12-21 DIAGNOSIS — G89.29 CHRONIC NECK PAIN: ICD-10-CM

## 2021-12-21 DIAGNOSIS — A60.09 HERPES GENITALIS IN WOMEN: ICD-10-CM

## 2021-12-21 DIAGNOSIS — Z00.00 VISIT FOR ANNUAL HEALTH EXAMINATION: ICD-10-CM

## 2021-12-21 LAB
25(OH)D3+25(OH)D2 SERPL-MCNC: 21 NG/ML (ref 30–96)
ALBUMIN SERPL BCP-MCNC: 3.6 G/DL (ref 3.5–5.2)
ALP SERPL-CCNC: 83 U/L (ref 55–135)
ALT SERPL W/O P-5'-P-CCNC: 17 U/L (ref 10–44)
ANION GAP SERPL CALC-SCNC: 10 MMOL/L (ref 8–16)
AST SERPL-CCNC: 18 U/L (ref 10–40)
BASOPHILS # BLD AUTO: 0.05 K/UL (ref 0–0.2)
BASOPHILS NFR BLD: 0.6 % (ref 0–1.9)
BILIRUB SERPL-MCNC: 0.4 MG/DL (ref 0.1–1)
BUN SERPL-MCNC: 16 MG/DL (ref 6–20)
CALCIUM SERPL-MCNC: 9.7 MG/DL (ref 8.7–10.5)
CHLORIDE SERPL-SCNC: 101 MMOL/L (ref 95–110)
CHOLEST SERPL-MCNC: 190 MG/DL (ref 120–199)
CHOLEST/HDLC SERPL: 3.1 {RATIO} (ref 2–5)
CO2 SERPL-SCNC: 28 MMOL/L (ref 23–29)
CREAT SERPL-MCNC: 0.8 MG/DL (ref 0.5–1.4)
DIFFERENTIAL METHOD: ABNORMAL
EOSINOPHIL # BLD AUTO: 0.4 K/UL (ref 0–0.5)
EOSINOPHIL NFR BLD: 4.7 % (ref 0–8)
ERYTHROCYTE [DISTWIDTH] IN BLOOD BY AUTOMATED COUNT: 12.2 % (ref 11.5–14.5)
EST. GFR  (AFRICAN AMERICAN): >60 ML/MIN/1.73 M^2
EST. GFR  (NON AFRICAN AMERICAN): >60 ML/MIN/1.73 M^2
ESTIMATED AVG GLUCOSE: 91 MG/DL (ref 68–131)
GLUCOSE SERPL-MCNC: 82 MG/DL (ref 70–110)
HBA1C MFR BLD: 4.8 % (ref 4–5.6)
HCT VFR BLD AUTO: 37.1 % (ref 37–48.5)
HDLC SERPL-MCNC: 61 MG/DL (ref 40–75)
HDLC SERPL: 32.1 % (ref 20–50)
HGB BLD-MCNC: 12.2 G/DL (ref 12–16)
IMM GRANULOCYTES # BLD AUTO: 0.02 K/UL (ref 0–0.04)
IMM GRANULOCYTES NFR BLD AUTO: 0.2 % (ref 0–0.5)
LDLC SERPL CALC-MCNC: 112.8 MG/DL (ref 63–159)
LYMPHOCYTES # BLD AUTO: 3.8 K/UL (ref 1–4.8)
LYMPHOCYTES NFR BLD: 45.7 % (ref 18–48)
MCH RBC QN AUTO: 31.8 PG (ref 27–31)
MCHC RBC AUTO-ENTMCNC: 32.9 G/DL (ref 32–36)
MCV RBC AUTO: 97 FL (ref 82–98)
MONOCYTES # BLD AUTO: 0.7 K/UL (ref 0.3–1)
MONOCYTES NFR BLD: 8.1 % (ref 4–15)
NEUTROPHILS # BLD AUTO: 3.4 K/UL (ref 1.8–7.7)
NEUTROPHILS NFR BLD: 40.7 % (ref 38–73)
NONHDLC SERPL-MCNC: 129 MG/DL
NRBC BLD-RTO: 0 /100 WBC
PLATELET # BLD AUTO: 210 K/UL (ref 150–450)
PMV BLD AUTO: 14.3 FL (ref 9.2–12.9)
POTASSIUM SERPL-SCNC: 4.1 MMOL/L (ref 3.5–5.1)
PROT SERPL-MCNC: 7.2 G/DL (ref 6–8.4)
RBC # BLD AUTO: 3.84 M/UL (ref 4–5.4)
SODIUM SERPL-SCNC: 139 MMOL/L (ref 136–145)
TRIGL SERPL-MCNC: 81 MG/DL (ref 30–150)
TSH SERPL DL<=0.005 MIU/L-ACNC: 2.9 UIU/ML (ref 0.4–4)
WBC # BLD AUTO: 8.27 K/UL (ref 3.9–12.7)

## 2021-12-21 PROCEDURE — 1160F RVW MEDS BY RX/DR IN RCRD: CPT | Mod: CPTII,S$GLB,, | Performed by: INTERNAL MEDICINE

## 2021-12-21 PROCEDURE — 83036 HEMOGLOBIN GLYCOSYLATED A1C: CPT | Performed by: INTERNAL MEDICINE

## 2021-12-21 PROCEDURE — 3008F BODY MASS INDEX DOCD: CPT | Mod: CPTII,S$GLB,, | Performed by: INTERNAL MEDICINE

## 2021-12-21 PROCEDURE — 1159F MED LIST DOCD IN RCRD: CPT | Mod: CPTII,S$GLB,, | Performed by: INTERNAL MEDICINE

## 2021-12-21 PROCEDURE — 99396 PREV VISIT EST AGE 40-64: CPT | Mod: S$GLB,,, | Performed by: INTERNAL MEDICINE

## 2021-12-21 PROCEDURE — 84443 ASSAY THYROID STIM HORMONE: CPT | Performed by: INTERNAL MEDICINE

## 2021-12-21 PROCEDURE — 3077F SYST BP >= 140 MM HG: CPT | Mod: CPTII,S$GLB,, | Performed by: INTERNAL MEDICINE

## 2021-12-21 PROCEDURE — 85025 COMPLETE CBC W/AUTO DIFF WBC: CPT | Performed by: INTERNAL MEDICINE

## 2021-12-21 PROCEDURE — 82306 VITAMIN D 25 HYDROXY: CPT | Performed by: INTERNAL MEDICINE

## 2021-12-21 PROCEDURE — 3079F DIAST BP 80-89 MM HG: CPT | Mod: CPTII,S$GLB,, | Performed by: INTERNAL MEDICINE

## 2021-12-21 PROCEDURE — 3079F PR MOST RECENT DIASTOLIC BLOOD PRESSURE 80-89 MM HG: ICD-10-PCS | Mod: CPTII,S$GLB,, | Performed by: INTERNAL MEDICINE

## 2021-12-21 PROCEDURE — 80053 COMPREHEN METABOLIC PANEL: CPT | Performed by: INTERNAL MEDICINE

## 2021-12-21 PROCEDURE — 1159F PR MEDICATION LIST DOCUMENTED IN MEDICAL RECORD: ICD-10-PCS | Mod: CPTII,S$GLB,, | Performed by: INTERNAL MEDICINE

## 2021-12-21 PROCEDURE — 99396 PR PREVENTIVE VISIT,EST,40-64: ICD-10-PCS | Mod: S$GLB,,, | Performed by: INTERNAL MEDICINE

## 2021-12-21 PROCEDURE — 80061 LIPID PANEL: CPT | Performed by: INTERNAL MEDICINE

## 2021-12-21 PROCEDURE — 3077F PR MOST RECENT SYSTOLIC BLOOD PRESSURE >= 140 MM HG: ICD-10-PCS | Mod: CPTII,S$GLB,, | Performed by: INTERNAL MEDICINE

## 2021-12-21 PROCEDURE — 1160F PR REVIEW ALL MEDS BY PRESCRIBER/CLIN PHARMACIST DOCUMENTED: ICD-10-PCS | Mod: CPTII,S$GLB,, | Performed by: INTERNAL MEDICINE

## 2021-12-21 PROCEDURE — 99999 PR PBB SHADOW E&M-EST. PATIENT-LVL III: ICD-10-PCS | Mod: PBBFAC,,, | Performed by: INTERNAL MEDICINE

## 2021-12-21 PROCEDURE — 3008F PR BODY MASS INDEX (BMI) DOCUMENTED: ICD-10-PCS | Mod: CPTII,S$GLB,, | Performed by: INTERNAL MEDICINE

## 2021-12-21 PROCEDURE — 99999 PR PBB SHADOW E&M-EST. PATIENT-LVL III: CPT | Mod: PBBFAC,,, | Performed by: INTERNAL MEDICINE

## 2021-12-21 PROCEDURE — 36415 COLL VENOUS BLD VENIPUNCTURE: CPT | Performed by: INTERNAL MEDICINE

## 2021-12-21 RX ORDER — OMEPRAZOLE 40 MG/1
CAPSULE, DELAYED RELEASE ORAL
Qty: 90 CAPSULE | Refills: 0 | Status: SHIPPED | OUTPATIENT
Start: 2021-12-21 | End: 2022-03-21

## 2021-12-21 RX ORDER — CHOLECALCIFEROL (VITAMIN D3) 25 MCG
1000 TABLET ORAL DAILY
COMMUNITY

## 2021-12-21 RX ORDER — DULOXETIN HYDROCHLORIDE 60 MG/1
60 CAPSULE, DELAYED RELEASE ORAL 2 TIMES DAILY
Qty: 180 CAPSULE | Refills: 1 | Status: SHIPPED | OUTPATIENT
Start: 2021-12-21 | End: 2022-11-17

## 2021-12-21 RX ORDER — VALACYCLOVIR HYDROCHLORIDE 500 MG/1
500 TABLET, FILM COATED ORAL DAILY
Qty: 90 TABLET | Refills: 1 | Status: SHIPPED | OUTPATIENT
Start: 2021-12-21 | End: 2022-07-12

## 2021-12-21 RX ORDER — PREGABALIN 25 MG/1
25 CAPSULE ORAL 2 TIMES DAILY
Qty: 60 CAPSULE | Refills: 1 | Status: SHIPPED | OUTPATIENT
Start: 2021-12-21 | End: 2022-03-23

## 2021-12-21 NOTE — PROGRESS NOTES
INTERNAL MEDICINE INITIAL VISIT NOTE      CHIEF COMPLAINT     Chief Complaint   Patient presents with    Establish Care       HPI     Chanelle Downey is a 61 y.o. female with nicotine dependence, GERD, depression, methadone dependence since 2005, cervical radiculopathy, lumbar discectomy and fusion here to establish care. Requesting med refills.     Reports numbness and tingling in hands ongoing for some time. Drops items often. Evaluated by PMR 2/2021 for similar symptoms - EMG, Cervical X-ray and MRI ordered. Has not completed.     Past Medical History:  Past Medical History:   Diagnosis Date    Back pain, chronic     Biliary colic 6/11/2018    Calculus of gallbladder without cholecystitis without obstruction 5/14/2018    Cervical radiculopathy 3/21/2016    01/17/18--clinic visit, pt describes intermittent BUE numbness/tingling throughout entire arm/hand.  Negative Spurling's test bilaterally.    Cervical stenosis of spinal canal 3/21/2016    Degenerative cervical spinal stenosis 2/10/2016    Depression     Flat back syndrome, postprocedural 1/17/2018    Dx 10/2016 when seen in NSGY clinic.  Presents to Neurology clinic 01/17/18 for worsening gait, LBP, LLE weakness, and RLE neuropathic pain.    HSV (herpes simplex virus) anogenital infection 9/16/2016       Review of Systems:  Review of Systems   HENT: Negative for hearing loss.    Eyes: Negative for discharge.   Respiratory: Negative for wheezing.    Cardiovascular: Negative for chest pain and palpitations.   Gastrointestinal: Negative for blood in stool, constipation, diarrhea and vomiting.   Genitourinary: Negative for dysuria and hematuria.   Musculoskeletal: Positive for neck pain.   Neurological: Positive for headaches. Negative for weakness.   Endo/Heme/Allergies: Negative for polydipsia.       Health Maintenance:   Immunizations:   Influenza - defer  Tdap - 5/2018  Covid 19 - discussed   HPV  Prevnar rec at 65 - next visit  Shingrix rec at 52  "- next visit    Cancer Screening:  PAP: s/p hys, not sexually active  Mammogram: ordered  Colonoscopy: ordered  DEXA:  Next visit  LDCT: next visit      PHYSICAL EXAM     BP (!) 140/82 (BP Location: Left arm, Patient Position: Sitting)   Pulse 81   Ht 5' 2" (1.575 m)   Wt 89 kg (196 lb 1.6 oz)   SpO2 96%   BMI 35.87 kg/m²     GEN - A+OX4, NAD, using cane   HEENT - PERRL, EOMI,   Neck - No thyromegaly or thyroid masses felt.  No cervical lymphadenopathy appreciated.  CV - RRR, no m/r/g  Chest - CTAB, no wheezing, crackles, or rhonchi  Abd - S/NT/ND/+BS.   Ext - 2+BDP. No C/C/E.  Skin - Normal color and texture, no rash, no skin lesions.  MSK - Flexed gait    ASSESSMENT/PLAN     Chanelle Downey is a 61 y.o. female with conditions as above.     Visit for annual health examination  -     CBC Auto Differential; Future; Expected date: 12/21/2021  -     Comprehensive Metabolic Panel; Future; Expected date: 12/21/2021  -     Hemoglobin A1C; Future; Expected date: 12/21/2021  -     Lipid Panel; Future; Expected date: 12/21/2021  -     TSH; Future; Expected date: 12/21/2021  -     Vitamin D; Future; Expected date: 12/21/2021    Elevated BP without diagnosis of hypertension  Elevated, advised to check BP 4 times weekly and keep log   Nurse BP check in 1 mo    Degenerative cervical spinal stenosis  Established with PMR; advised to complete imaging studies ordered   -     pregabalin (LYRICA) 25 MG capsule; Take 1 capsule (25 mg total) by mouth 2 (two) times daily.  Dispense: 60 capsule; Refill: 1    Cigarette nicotine dependence without complication  Contemplative    Methadone dependence  Managed by outside clinic    Chronic neck pain  As above  -     DULoxetine (CYMBALTA) 60 MG capsule; Take 1 capsule (60 mg total) by mouth 2 (two) times daily.  Dispense: 180 capsule; Refill: 1  -     pregabalin (LYRICA) 25 MG capsule; Take 1 capsule (25 mg total) by mouth 2 (two) times daily.  Dispense: 60 capsule; Refill: " 1    Depression, unspecified depression type  Stable  -     DULoxetine (CYMBALTA) 60 MG capsule; Take 1 capsule (60 mg total) by mouth 2 (two) times daily.  Dispense: 180 capsule; Refill: 1    Gastroesophageal reflux disease without esophagitis  Stable  -     omeprazole (PRILOSEC) 40 MG capsule; TAKE 1 CAPSULE(40 MG) BY MOUTH EVERY DAY  Dispense: 90 capsule; Refill: 0    Herpes genitalis in women  -     valACYclovir (VALTREX) 500 MG tablet; Take 1 tablet (500 mg total) by mouth once daily.  Dispense: 90 tablet; Refill: 1    Herpes gingivostomatitis  -     valACYclovir (VALTREX) 500 MG tablet; Take 1 tablet (500 mg total) by mouth once daily.  Dispense: 90 tablet; Refill: 1    HM as above.    RTC in 3 mo, sooner if needed and depending on labs.    Adia Barry MD  Department of Internal Medicine - [facility]  01/07/2022

## 2021-12-21 NOTE — PATIENT INSTRUCTIONS
Please check your blood pressure at least 4 times a week around the same time of day. Keep a log of these numbers and bring to your nurse BP check.     Please schedule your MRI and Xray.     Please schedule your mammogram and colonoscopy.

## 2021-12-28 DIAGNOSIS — E55.9 VITAMIN D DEFICIENCY: Primary | ICD-10-CM

## 2021-12-28 RX ORDER — ERGOCALCIFEROL 1.25 MG/1
50000 CAPSULE ORAL
Qty: 12 CAPSULE | Refills: 2 | Status: SHIPPED | OUTPATIENT
Start: 2021-12-28 | End: 2022-12-21

## 2022-01-02 ENCOUNTER — PATIENT MESSAGE (OUTPATIENT)
Dept: ADMINISTRATIVE | Facility: HOSPITAL | Age: 62
End: 2022-01-02
Payer: COMMERCIAL

## 2022-01-19 ENCOUNTER — PATIENT MESSAGE (OUTPATIENT)
Dept: ADMINISTRATIVE | Facility: HOSPITAL | Age: 62
End: 2022-01-19
Payer: COMMERCIAL

## 2022-03-16 ENCOUNTER — PATIENT MESSAGE (OUTPATIENT)
Dept: ADMINISTRATIVE | Facility: OTHER | Age: 62
End: 2022-03-16
Payer: COMMERCIAL

## 2022-03-16 ENCOUNTER — PATIENT OUTREACH (OUTPATIENT)
Dept: ADMINISTRATIVE | Facility: OTHER | Age: 62
End: 2022-03-16
Payer: COMMERCIAL

## 2022-03-16 NOTE — PROGRESS NOTES
Care Everywhere: updated  Immunization: updated  Health Maintenance: updated  Media Review: review for outside mammogram and colon cancer report   Legacy Review:   DIS:no profile in portal   Order placed:   Upcoming appts:  EFAX:  Task Tickets:Mammogram scheduling ticket sent to patient's portal   Referrals:

## 2022-06-01 ENCOUNTER — PATIENT MESSAGE (OUTPATIENT)
Dept: ADMINISTRATIVE | Facility: HOSPITAL | Age: 62
End: 2022-06-01
Payer: COMMERCIAL

## 2022-07-11 DIAGNOSIS — A60.09 HERPES GENITALIS IN WOMEN: ICD-10-CM

## 2022-07-11 DIAGNOSIS — B00.2 HERPES GINGIVOSTOMATITIS: ICD-10-CM

## 2022-07-11 NOTE — TELEPHONE ENCOUNTER
No new care gaps identified.  [facility] Embedded Care Gaps. Reference number: [id number]. 7/11/2022   10:47:59 AM IRVINGT

## 2022-07-12 RX ORDER — VALACYCLOVIR HYDROCHLORIDE 500 MG/1
TABLET, FILM COATED ORAL
Qty: 90 TABLET | Refills: 1 | Status: SHIPPED | OUTPATIENT
Start: 2022-07-12 | End: 2022-12-21 | Stop reason: SDUPTHER

## 2022-07-12 NOTE — TELEPHONE ENCOUNTER
Refill Decision Note   Chanelle Downey  is requesting a refill authorization.  Brief Assessment and Rationale for Refill:  Approve     Medication Therapy Plan:       Medication Reconciliation Completed: No   Comments:     No Care Gaps recommended.     Note composed:10:19 AM 07/12/2022

## 2022-09-22 DIAGNOSIS — Z12.31 OTHER SCREENING MAMMOGRAM: ICD-10-CM

## 2022-12-21 ENCOUNTER — OFFICE VISIT (OUTPATIENT)
Dept: INTERNAL MEDICINE | Facility: CLINIC | Age: 62
End: 2022-12-21
Payer: COMMERCIAL

## 2022-12-21 VITALS
SYSTOLIC BLOOD PRESSURE: 134 MMHG | OXYGEN SATURATION: 98 % | WEIGHT: 206.44 LBS | BODY MASS INDEX: 37.99 KG/M2 | HEIGHT: 62 IN | DIASTOLIC BLOOD PRESSURE: 84 MMHG | HEART RATE: 76 BPM

## 2022-12-21 DIAGNOSIS — R60.0 BILATERAL LOWER EXTREMITY EDEMA: ICD-10-CM

## 2022-12-21 DIAGNOSIS — F11.20 METHADONE DEPENDENCE: ICD-10-CM

## 2022-12-21 DIAGNOSIS — F17.210 CIGARETTE NICOTINE DEPENDENCE WITHOUT COMPLICATION: ICD-10-CM

## 2022-12-21 DIAGNOSIS — K21.9 GASTROESOPHAGEAL REFLUX DISEASE WITHOUT ESOPHAGITIS: ICD-10-CM

## 2022-12-21 DIAGNOSIS — F32.A DEPRESSION, UNSPECIFIED DEPRESSION TYPE: ICD-10-CM

## 2022-12-21 DIAGNOSIS — B00.2 HERPES GINGIVOSTOMATITIS: ICD-10-CM

## 2022-12-21 DIAGNOSIS — Z12.11 ENCOUNTER FOR SCREENING FOR MALIGNANT NEOPLASM OF COLON: ICD-10-CM

## 2022-12-21 DIAGNOSIS — I10 ESSENTIAL HYPERTENSION: ICD-10-CM

## 2022-12-21 DIAGNOSIS — A60.09 HERPES GENITALIS IN WOMEN: ICD-10-CM

## 2022-12-21 DIAGNOSIS — Z00.00 VISIT FOR ANNUAL HEALTH EXAMINATION: Primary | ICD-10-CM

## 2022-12-21 DIAGNOSIS — M48.02 DEGENERATIVE CERVICAL SPINAL STENOSIS: ICD-10-CM

## 2022-12-21 DIAGNOSIS — G89.4 CHRONIC PAIN SYNDROME: ICD-10-CM

## 2022-12-21 DIAGNOSIS — E66.01 CLASS 2 SEVERE OBESITY DUE TO EXCESS CALORIES WITH SERIOUS COMORBIDITY AND BODY MASS INDEX (BMI) OF 37.0 TO 37.9 IN ADULT: ICD-10-CM

## 2022-12-21 PROCEDURE — 1160F PR REVIEW ALL MEDS BY PRESCRIBER/CLIN PHARMACIST DOCUMENTED: ICD-10-PCS | Mod: CPTII,S$GLB,, | Performed by: INTERNAL MEDICINE

## 2022-12-21 PROCEDURE — 3079F PR MOST RECENT DIASTOLIC BLOOD PRESSURE 80-89 MM HG: ICD-10-PCS | Mod: CPTII,S$GLB,, | Performed by: INTERNAL MEDICINE

## 2022-12-21 PROCEDURE — 1160F RVW MEDS BY RX/DR IN RCRD: CPT | Mod: CPTII,S$GLB,, | Performed by: INTERNAL MEDICINE

## 2022-12-21 PROCEDURE — 99999 PR PBB SHADOW E&M-EST. PATIENT-LVL IV: ICD-10-PCS | Mod: PBBFAC,,, | Performed by: INTERNAL MEDICINE

## 2022-12-21 PROCEDURE — 99396 PR PREVENTIVE VISIT,EST,40-64: ICD-10-PCS | Mod: S$GLB,,, | Performed by: INTERNAL MEDICINE

## 2022-12-21 PROCEDURE — 99999 PR PBB SHADOW E&M-EST. PATIENT-LVL IV: CPT | Mod: PBBFAC,,, | Performed by: INTERNAL MEDICINE

## 2022-12-21 PROCEDURE — 3008F BODY MASS INDEX DOCD: CPT | Mod: CPTII,S$GLB,, | Performed by: INTERNAL MEDICINE

## 2022-12-21 PROCEDURE — 3075F PR MOST RECENT SYSTOLIC BLOOD PRESS GE 130-139MM HG: ICD-10-PCS | Mod: CPTII,S$GLB,, | Performed by: INTERNAL MEDICINE

## 2022-12-21 PROCEDURE — 1159F PR MEDICATION LIST DOCUMENTED IN MEDICAL RECORD: ICD-10-PCS | Mod: CPTII,S$GLB,, | Performed by: INTERNAL MEDICINE

## 2022-12-21 PROCEDURE — 3075F SYST BP GE 130 - 139MM HG: CPT | Mod: CPTII,S$GLB,, | Performed by: INTERNAL MEDICINE

## 2022-12-21 PROCEDURE — 1159F MED LIST DOCD IN RCRD: CPT | Mod: CPTII,S$GLB,, | Performed by: INTERNAL MEDICINE

## 2022-12-21 PROCEDURE — 99396 PREV VISIT EST AGE 40-64: CPT | Mod: S$GLB,,, | Performed by: INTERNAL MEDICINE

## 2022-12-21 PROCEDURE — 3008F PR BODY MASS INDEX (BMI) DOCUMENTED: ICD-10-PCS | Mod: CPTII,S$GLB,, | Performed by: INTERNAL MEDICINE

## 2022-12-21 PROCEDURE — 3079F DIAST BP 80-89 MM HG: CPT | Mod: CPTII,S$GLB,, | Performed by: INTERNAL MEDICINE

## 2022-12-21 RX ORDER — OMEPRAZOLE 40 MG/1
40 CAPSULE, DELAYED RELEASE ORAL DAILY
Qty: 90 CAPSULE | Refills: 3 | Status: SHIPPED | OUTPATIENT
Start: 2022-12-21 | End: 2024-02-05

## 2022-12-21 RX ORDER — DULOXETIN HYDROCHLORIDE 60 MG/1
60 CAPSULE, DELAYED RELEASE ORAL 2 TIMES DAILY
Qty: 180 CAPSULE | Refills: 3 | Status: SHIPPED | OUTPATIENT
Start: 2022-12-21 | End: 2024-01-10

## 2022-12-21 RX ORDER — HYDROCHLOROTHIAZIDE 12.5 MG/1
12.5 TABLET ORAL DAILY
Qty: 30 TABLET | Refills: 5 | Status: SHIPPED | OUTPATIENT
Start: 2022-12-21 | End: 2023-06-14 | Stop reason: SDUPTHER

## 2022-12-21 RX ORDER — VALACYCLOVIR HYDROCHLORIDE 500 MG/1
500 TABLET, FILM COATED ORAL DAILY
Qty: 90 TABLET | Refills: 3 | Status: SHIPPED | OUTPATIENT
Start: 2022-12-21

## 2022-12-21 NOTE — LETTER
December 21, 2022    Chanelle Downey  [address]  [place] [address]             Derick Cunningham [facility]  [address]  [facility] [address]  Phone: [phone]  Fax: [phone] To Whom It May Concern,     Ms. Downey was seen in my office for medical care today. Given her physical exam findings, she has been started on a new medication which requires close monitoring of her blood pressure. Due to possible medication side effects, I have advised her refrain from working between 12/21/2022-12/24/2022.     If you have any questions or concerns, please don't hesitate to call.    Sincerely,          Bonny Barry MD

## 2022-12-21 NOTE — PROGRESS NOTES
INTERNAL MEDICINE ESTABLISHED PATIENT VISIT NOTE    Subjective:     Chief Complaint: Leg Swelling       Patient ID: Chanelle Downey is a 62 y.o. female with nicotine dependence, GERD, depression, methadone dependence since 2005, cervical radiculopathy, lumbar discectomy and fusion, last seen by me 12/2021, here today for annual.    Concerned about leg swelling, ongoing for a few years. Now is pitting. Associated pain and redness, worse with standing. Some improvement after rest. Unable to tolerate compression stockings.     Also notes BP elevations at night when at work, as high as 180-200/. Other times, 130/80.     Past Medical History:  Past Medical History:   Diagnosis Date    Back pain, chronic     Biliary colic 6/11/2018    Calculus of gallbladder without cholecystitis without obstruction 5/14/2018    Cervical radiculopathy 3/21/2016    01/17/18--clinic visit, pt describes intermittent BUE numbness/tingling throughout entire arm/hand.  Negative Spurling's test bilaterally.    Cervical stenosis of spinal canal 3/21/2016    Degenerative cervical spinal stenosis 2/10/2016    Depression     Flat back syndrome, postprocedural 1/17/2018    Dx 10/2016 when seen in NSGY clinic.  Presents to Neurology clinic 01/17/18 for worsening gait, LBP, LLE weakness, and RLE neuropathic pain.    HSV (herpes simplex virus) anogenital infection 9/16/2016          Review of Systems:  Review of Systems   Constitutional:  Negative for chills and fever.   HENT:  Negative for congestion.    Respiratory:  Negative for cough and shortness of breath.    Cardiovascular:  Positive for leg swelling. Negative for chest pain.   Gastrointestinal:  Negative for constipation, nausea and vomiting.   Genitourinary:  Negative for hematuria and urgency.   Musculoskeletal:  Negative for falls.   Skin:  Negative for rash.   Neurological:  Negative for dizziness and loss of consciousness.     Health Maintenance:   Immunizations:   Influenza -  "defer  Tdap - 5/2018  Covid 19 - discussed   HPV  Prevnar rec at 67 - next visit  Shingrix rec at 52 - next visit     Cancer Screening:  PAP: s/p hys  Mammogram: ordered  Colonoscopy: ordered  DEXA:  n/a  LDCT: next visit    Objective:   /84 (BP Location: Left arm, Patient Position: Sitting, BP Method: Large (Manual))   Pulse 76   Ht 5' 2" (1.575 m)   Wt 93.6 kg (206 lb 7.4 oz)   SpO2 98%   BMI 37.76 kg/m²        General: AAO x3, no apparent distress  HEENT: PERRL  CV: RRR, S1/S2 heard  Pulm: Lungs CTAB, no crackles, no wheezes  Abd: s/NT/ND +BS  Extremities: 1-2+ bilateral pitting edema with venous insufficiency changes    Labs:       Assessment/Plan     Visit for annual health examination  Labs ordered    Essential hypertension  Acceptable today, hypertensive  to 180-200/  Given LE swelling, trial of low dose HCTZ  -     CBC Auto Differential; Future; Expected date: 12/21/2022  -     Comprehensive Metabolic Panel; Future; Expected date: 12/21/2022  -     hydroCHLOROthiazide (HYDRODIURIL) 12.5 MG Tab; Take 1 tablet (12.5 mg total) by mouth once daily.  Dispense: 30 tablet; Refill: 5    Bilateral lower extremity edema  -     B-TYPE NATRIURETIC PEPTIDE; Future; Expected date: 12/21/2022  -     TSH; Future; Expected date: 12/21/2022  -     hydroCHLOROthiazide (HYDRODIURIL) 12.5 MG Tab; Take 1 tablet (12.5 mg total) by mouth once daily.  Dispense: 30 tablet; Refill: 5    Degenerative cervical spinal stenosis  -     DULoxetine (CYMBALTA) 60 MG capsule; Take 1 capsule (60 mg total) by mouth 2 (two) times daily.  Dispense: 180 capsule; Refill: 3    Chronic pain syndrome  -     DULoxetine (CYMBALTA) 60 MG capsule; Take 1 capsule (60 mg total) by mouth 2 (two) times daily.  Dispense: 180 capsule; Refill: 3    Methadone dependence    Gastroesophageal reflux disease without esophagitis  -     omeprazole (PRILOSEC) 40 MG capsule; Take 1 capsule (40 mg total) by mouth once daily.  Dispense: 90 capsule; Refill: " 3    Depression, unspecified depression type  -     DULoxetine (CYMBALTA) 60 MG capsule; Take 1 capsule (60 mg total) by mouth 2 (two) times daily.  Dispense: 180 capsule; Refill: 3    Cigarette nicotine dependence without complication    Herpes genitalis in women  -     valACYclovir (VALTREX) 500 MG tablet; Take 1 tablet (500 mg total) by mouth once daily.  Dispense: 90 tablet; Refill: 3    Herpes gingivostomatitis  -     valACYclovir (VALTREX) 500 MG tablet; Take 1 tablet (500 mg total) by mouth once daily.  Dispense: 90 tablet; Refill: 3    Encounter for screening for malignant neoplasm of colon  -     Ambulatory referral/consult to Endo Procedure ; Future; Expected date: 12/22/2022    Class 2 severe obesity due to excess calories with serious comorbidity and body mass index (BMI) of 37.0 to 37.9 in adult      HM as above.    Adia Barry MD  Department of Internal Medicine - [facility]  12/21/2022

## 2023-02-02 ENCOUNTER — TELEPHONE (OUTPATIENT)
Dept: SPINE | Facility: CLINIC | Age: 63
End: 2023-02-02
Payer: COMMERCIAL

## 2023-02-08 ENCOUNTER — CLINICAL SUPPORT (OUTPATIENT)
Dept: ENDOSCOPY | Facility: HOSPITAL | Age: 63
End: 2023-02-08
Payer: COMMERCIAL

## 2023-02-08 VITALS — WEIGHT: 206 LBS | BODY MASS INDEX: 37.91 KG/M2 | HEIGHT: 62 IN

## 2023-02-08 DIAGNOSIS — Z12.11 ENCOUNTER FOR SCREENING FOR MALIGNANT NEOPLASM OF COLON: ICD-10-CM

## 2023-02-08 RX ORDER — POLYETHYLENE GLYCOL 3350, SODIUM SULFATE ANHYDROUS, SODIUM BICARBONATE, SODIUM CHLORIDE, POTASSIUM CHLORIDE 236; 22.74; 6.74; 5.86; 2.97 G/4L; G/4L; G/4L; G/4L; G/4L
4 POWDER, FOR SOLUTION ORAL ONCE
Qty: 4000 ML | Refills: 0 | Status: SHIPPED | OUTPATIENT
Start: 2023-02-08 | End: 2023-02-08

## 2023-02-14 ENCOUNTER — TELEPHONE (OUTPATIENT)
Dept: SPORTS MEDICINE | Facility: CLINIC | Age: 63
End: 2023-02-14
Payer: COMMERCIAL

## 2023-02-14 DIAGNOSIS — M25.562 LEFT KNEE PAIN, UNSPECIFIED CHRONICITY: Primary | ICD-10-CM

## 2023-02-14 NOTE — TELEPHONE ENCOUNTER
Called and spoke with patient to let her know that we will need x-ray before she see Roel on Monday, X-ray is schedule at 9:30 on 02/20/23.  Patient is fine coming in 30 min early for x-ray.

## 2023-02-15 ENCOUNTER — OFFICE VISIT (OUTPATIENT)
Dept: INTERNAL MEDICINE | Facility: CLINIC | Age: 63
End: 2023-02-15
Payer: COMMERCIAL

## 2023-02-15 ENCOUNTER — HOSPITAL ENCOUNTER (OUTPATIENT)
Dept: RADIOLOGY | Facility: HOSPITAL | Age: 63
Discharge: HOME | End: 2023-02-15
Attending: INTERNAL MEDICINE
Payer: COMMERCIAL

## 2023-02-15 VITALS
BODY MASS INDEX: 40.32 KG/M2 | SYSTOLIC BLOOD PRESSURE: 130 MMHG | DIASTOLIC BLOOD PRESSURE: 70 MMHG | HEART RATE: 87 BPM | WEIGHT: 205.38 LBS | HEIGHT: 60 IN | OXYGEN SATURATION: 96 %

## 2023-02-15 VITALS — WEIGHT: 206 LBS | HEIGHT: 62 IN | BODY MASS INDEX: 37.91 KG/M2

## 2023-02-15 DIAGNOSIS — F11.20 METHADONE DEPENDENCE: ICD-10-CM

## 2023-02-15 DIAGNOSIS — Z12.31 OTHER SCREENING MAMMOGRAM: ICD-10-CM

## 2023-02-15 DIAGNOSIS — I10 HYPERTENSION, ESSENTIAL: Primary | ICD-10-CM

## 2023-02-15 DIAGNOSIS — Z00.00 HEALTH CARE MAINTENANCE: ICD-10-CM

## 2023-02-15 DIAGNOSIS — F32.0 CURRENT MILD EPISODE OF MAJOR DEPRESSIVE DISORDER, UNSPECIFIED WHETHER RECURRENT: ICD-10-CM

## 2023-02-15 DIAGNOSIS — G89.4 CHRONIC PAIN SYNDROME: ICD-10-CM

## 2023-02-15 LAB
BACTERIA #/AREA URNS AUTO: ABNORMAL /HPF
BILIRUB UR QL STRIP: NEGATIVE
CAOX CRY UR QL COMP ASSIST: ABNORMAL
CLARITY UR REFRACT.AUTO: ABNORMAL
COLOR UR AUTO: YELLOW
GLUCOSE UR QL STRIP: NEGATIVE
HGB UR QL STRIP: NEGATIVE
KETONES UR QL STRIP: NEGATIVE
LEUKOCYTE ESTERASE UR QL STRIP: ABNORMAL
MICROSCOPIC COMMENT: ABNORMAL
NITRITE UR QL STRIP: NEGATIVE
PH UR STRIP: 5 [PH] (ref 5–8)
PROT UR QL STRIP: NEGATIVE
RBC #/AREA URNS AUTO: 7 /HPF (ref 0–4)
SP GR UR STRIP: 1.01 (ref 1–1.03)
SQUAMOUS #/AREA URNS AUTO: 31 /HPF
URN SPEC COLLECT METH UR: ABNORMAL
WBC #/AREA URNS AUTO: 42 /HPF (ref 0–5)
YEAST UR QL AUTO: ABNORMAL

## 2023-02-15 PROCEDURE — 77063 MAMMO DIGITAL SCREENING BILAT WITH TOMO: ICD-10-PCS | Mod: 26,,, | Performed by: RADIOLOGY

## 2023-02-15 PROCEDURE — 1159F MED LIST DOCD IN RCRD: CPT | Mod: CPTII,S$GLB,, | Performed by: PHYSICIAN ASSISTANT

## 2023-02-15 PROCEDURE — 3078F DIAST BP <80 MM HG: CPT | Mod: CPTII,S$GLB,, | Performed by: PHYSICIAN ASSISTANT

## 2023-02-15 PROCEDURE — 81001 URINALYSIS AUTO W/SCOPE: CPT | Performed by: PHYSICIAN ASSISTANT

## 2023-02-15 PROCEDURE — 87086 URINE CULTURE/COLONY COUNT: CPT | Performed by: PHYSICIAN ASSISTANT

## 2023-02-15 PROCEDURE — 99214 PR OFFICE/OUTPT VISIT, EST, LEVL IV, 30-39 MIN: ICD-10-PCS | Mod: S$GLB,,, | Performed by: PHYSICIAN ASSISTANT

## 2023-02-15 PROCEDURE — 77067 MAMMO DIGITAL SCREENING BILAT WITH TOMO: ICD-10-PCS | Mod: 26,,, | Performed by: RADIOLOGY

## 2023-02-15 PROCEDURE — 1160F PR REVIEW ALL MEDS BY PRESCRIBER/CLIN PHARMACIST DOCUMENTED: ICD-10-PCS | Mod: CPTII,S$GLB,, | Performed by: PHYSICIAN ASSISTANT

## 2023-02-15 PROCEDURE — 99999 PR PBB SHADOW E&M-EST. PATIENT-LVL IV: ICD-10-PCS | Mod: PBBFAC,,, | Performed by: PHYSICIAN ASSISTANT

## 2023-02-15 PROCEDURE — 77067 SCR MAMMO BI INCL CAD: CPT | Mod: 26,,, | Performed by: RADIOLOGY

## 2023-02-15 PROCEDURE — 99214 OFFICE O/P EST MOD 30 MIN: CPT | Mod: S$GLB,,, | Performed by: PHYSICIAN ASSISTANT

## 2023-02-15 PROCEDURE — 3075F PR MOST RECENT SYSTOLIC BLOOD PRESS GE 130-139MM HG: ICD-10-PCS | Mod: CPTII,S$GLB,, | Performed by: PHYSICIAN ASSISTANT

## 2023-02-15 PROCEDURE — 77063 BREAST TOMOSYNTHESIS BI: CPT | Mod: 26,,, | Performed by: RADIOLOGY

## 2023-02-15 PROCEDURE — 1160F RVW MEDS BY RX/DR IN RCRD: CPT | Mod: CPTII,S$GLB,, | Performed by: PHYSICIAN ASSISTANT

## 2023-02-15 PROCEDURE — 1159F PR MEDICATION LIST DOCUMENTED IN MEDICAL RECORD: ICD-10-PCS | Mod: CPTII,S$GLB,, | Performed by: PHYSICIAN ASSISTANT

## 2023-02-15 PROCEDURE — 3008F PR BODY MASS INDEX (BMI) DOCUMENTED: ICD-10-PCS | Mod: CPTII,S$GLB,, | Performed by: PHYSICIAN ASSISTANT

## 2023-02-15 PROCEDURE — 3075F SYST BP GE 130 - 139MM HG: CPT | Mod: CPTII,S$GLB,, | Performed by: PHYSICIAN ASSISTANT

## 2023-02-15 PROCEDURE — 77067 SCR MAMMO BI INCL CAD: CPT | Mod: TC

## 2023-02-15 PROCEDURE — 3008F BODY MASS INDEX DOCD: CPT | Mod: CPTII,S$GLB,, | Performed by: PHYSICIAN ASSISTANT

## 2023-02-15 PROCEDURE — 99999 PR PBB SHADOW E&M-EST. PATIENT-LVL IV: CPT | Mod: PBBFAC,,, | Performed by: PHYSICIAN ASSISTANT

## 2023-02-15 PROCEDURE — 3078F PR MOST RECENT DIASTOLIC BLOOD PRESSURE < 80 MM HG: ICD-10-PCS | Mod: CPTII,S$GLB,, | Performed by: PHYSICIAN ASSISTANT

## 2023-02-15 NOTE — PROGRESS NOTES
Subjective:       Patient ID: Chanelle Downey is a 62 y.o. female.        Chief Complaint: Follow-up    Chanelle Downey is an established patient of Bonny Barry MD here today for follow up visit.    GERD -   Omeprazole 40 mg daily    Depression -   Cymbalta 60 mg BID    Methadone dependence - followed by methadone clinic, told her urine seems dilute?, unsure of exact issue, so will check a urine and due for labs    Cervical radiculopathy, lumbar discectomy and fusion    HTN - started hctz 12.5 mg and BP much more even and controlled, LE edema improved    BMI 40    Mammo scheduled today, colonoscopy upcoming, declines vaccines         Review of Systems   Constitutional:  Negative for chills, diaphoresis, fatigue and fever.   HENT:  Negative for congestion and sore throat.    Eyes:  Negative for visual disturbance.   Respiratory:  Negative for cough, chest tightness and shortness of breath.    Cardiovascular:  Negative for chest pain, palpitations and leg swelling.   Gastrointestinal:  Negative for abdominal pain, blood in stool, constipation, diarrhea, nausea and vomiting.   Genitourinary:  Negative for dysuria, frequency, hematuria and urgency.   Musculoskeletal:  Negative for arthralgias and back pain.   Skin:  Negative for rash.   Neurological:  Negative for dizziness, syncope, weakness and headaches.   Psychiatric/Behavioral:  Negative for dysphoric mood and sleep disturbance. The patient is not nervous/anxious.      Objective:      Physical Exam  Vitals and nursing note reviewed.   Constitutional:       Appearance: Normal appearance. She is well-developed.   HENT:      Head: Normocephalic.      Right Ear: External ear normal.      Left Ear: External ear normal.   Eyes:      Pupils: Pupils are equal, round, and reactive to light.   Cardiovascular:      Rate and Rhythm: Normal rate and regular rhythm.      Heart sounds: Normal heart sounds. No murmur heard.    No friction rub. No gallop.   Pulmonary:  "     Effort: Pulmonary effort is normal. No respiratory distress.      Breath sounds: Normal breath sounds.   Abdominal:      Palpations: Abdomen is soft.      Tenderness: There is no abdominal tenderness.   Skin:     General: Skin is warm and dry.   Neurological:      Mental Status: She is alert.       Assessment:       1. Hypertension, essential    2. Chronic pain syndrome    3. Health care maintenance    4. Methadone dependence    5. Current mild episode of major depressive disorder, unspecified whether recurrent        Plan:       Chanelle was seen today for follow-up.    Diagnoses and all orders for this visit:    Hypertension, essential - stable and controlled, continue hctz    Chronic pain syndrome    Health care maintenance  -     CBC Auto Differential; Future  -     Comprehensive Metabolic Panel; Future  -     Hemoglobin A1C; Future  -     Lipid Panel; Future  -     TSH; Future  -     Urinalysis, Reflex to Urine Culture Urine, Clean Catch  -     HIV 1/2 Ag/Ab (4th Gen); Future    Methadone dependence    Current mild episode of major depressive disorder, unspecified whether recurrent - stable and controlled    Schedule fasting labs  She has been at the same methadone clinic x 18 years, she is unsure of exact issue with urine, check labs and urine    Pt has been given instructions populated from patient instructions database and has verbalized understanding of the after visit summary and information contained wherein.    Follow up with a primary care provider. May go to ER for acute shortness of breath, lightheadedness, fever, or any other emergent complaints or changes in condition.    "This note will be shared with the patient"    Future Appointments   Date Time Provider Department Center   2/16/2023  8:10 AM LAB, APPOINTMENT [facility] IM Derick Cunningham W   2/20/2023  9:30 AM [facility] XR1 [facility] X[facility]   2/20/2023 10:00 AM Roel Chung PA-C [id number]   8/15/2023 10:30 AM Quinton Miller MD " [facility]

## 2023-02-16 ENCOUNTER — LAB VISIT (OUTPATIENT)
Dept: LAB | Facility: HOSPITAL | Age: 63
End: 2023-02-16
Payer: COMMERCIAL

## 2023-02-16 DIAGNOSIS — Z00.00 HEALTH CARE MAINTENANCE: ICD-10-CM

## 2023-02-16 LAB
ALBUMIN SERPL BCP-MCNC: 3.6 G/DL (ref 3.5–5.2)
ALP SERPL-CCNC: 111 U/L (ref 55–135)
ALT SERPL W/O P-5'-P-CCNC: 27 U/L (ref 10–44)
ANION GAP SERPL CALC-SCNC: 11 MMOL/L (ref 8–16)
AST SERPL-CCNC: 32 U/L (ref 10–40)
BASOPHILS # BLD AUTO: 0.03 K/UL (ref 0–0.2)
BASOPHILS NFR BLD: 0.2 % (ref 0–1.9)
BILIRUB SERPL-MCNC: 0.3 MG/DL (ref 0.1–1)
BUN SERPL-MCNC: 9 MG/DL (ref 6–20)
CALCIUM SERPL-MCNC: 9.4 MG/DL (ref 8.7–10.5)
CHLORIDE SERPL-SCNC: 99 MMOL/L (ref 95–110)
CHOLEST SERPL-MCNC: 210 MG/DL (ref 120–199)
CHOLEST/HDLC SERPL: 3.2 {RATIO} (ref 2–5)
CO2 SERPL-SCNC: 29 MMOL/L (ref 23–29)
CREAT SERPL-MCNC: 0.8 MG/DL (ref 0.5–1.4)
DIFFERENTIAL METHOD: ABNORMAL
EOSINOPHIL # BLD AUTO: 0.2 K/UL (ref 0–0.5)
EOSINOPHIL NFR BLD: 1.8 % (ref 0–8)
ERYTHROCYTE [DISTWIDTH] IN BLOOD BY AUTOMATED COUNT: 12.9 % (ref 11.5–14.5)
EST. GFR  (NO RACE VARIABLE): >60 ML/MIN/1.73 M^2
ESTIMATED AVG GLUCOSE: 94 MG/DL (ref 68–131)
GLUCOSE SERPL-MCNC: 76 MG/DL (ref 70–110)
HBA1C MFR BLD: 4.9 % (ref 4–5.6)
HCT VFR BLD AUTO: 35.9 % (ref 37–48.5)
HDLC SERPL-MCNC: 65 MG/DL (ref 40–75)
HDLC SERPL: 31 % (ref 20–50)
HGB BLD-MCNC: 11.6 G/DL (ref 12–16)
HIV 1+2 AB+HIV1 P24 AG SERPL QL IA: NORMAL
IMM GRANULOCYTES # BLD AUTO: 0.04 K/UL (ref 0–0.04)
IMM GRANULOCYTES NFR BLD AUTO: 0.3 % (ref 0–0.5)
LDLC SERPL CALC-MCNC: 129.4 MG/DL (ref 63–159)
LYMPHOCYTES # BLD AUTO: 2 K/UL (ref 1–4.8)
LYMPHOCYTES NFR BLD: 14.9 % (ref 18–48)
MCH RBC QN AUTO: 32.5 PG (ref 27–31)
MCHC RBC AUTO-ENTMCNC: 32.3 G/DL (ref 32–36)
MCV RBC AUTO: 101 FL (ref 82–98)
MONOCYTES # BLD AUTO: 0.8 K/UL (ref 0.3–1)
MONOCYTES NFR BLD: 6.3 % (ref 4–15)
NEUTROPHILS # BLD AUTO: 10 K/UL (ref 1.8–7.7)
NEUTROPHILS NFR BLD: 76.5 % (ref 38–73)
NONHDLC SERPL-MCNC: 145 MG/DL
NRBC BLD-RTO: 0 /100 WBC
PLATELET # BLD AUTO: 222 K/UL (ref 150–450)
PMV BLD AUTO: 13.4 FL (ref 9.2–12.9)
POTASSIUM SERPL-SCNC: 3.9 MMOL/L (ref 3.5–5.1)
PROT SERPL-MCNC: 7.2 G/DL (ref 6–8.4)
RBC # BLD AUTO: 3.57 M/UL (ref 4–5.4)
SODIUM SERPL-SCNC: 139 MMOL/L (ref 136–145)
T4 FREE SERPL-MCNC: 0.94 NG/DL (ref 0.71–1.51)
TRIGL SERPL-MCNC: 78 MG/DL (ref 30–150)
TSH SERPL DL<=0.005 MIU/L-ACNC: 5.32 UIU/ML (ref 0.4–4)
WBC # BLD AUTO: 13.1 K/UL (ref 3.9–12.7)

## 2023-02-16 PROCEDURE — 84439 ASSAY OF FREE THYROXINE: CPT | Performed by: PHYSICIAN ASSISTANT

## 2023-02-16 PROCEDURE — 80053 COMPREHEN METABOLIC PANEL: CPT | Performed by: PHYSICIAN ASSISTANT

## 2023-02-16 PROCEDURE — 80061 LIPID PANEL: CPT | Performed by: PHYSICIAN ASSISTANT

## 2023-02-16 PROCEDURE — 36415 COLL VENOUS BLD VENIPUNCTURE: CPT | Performed by: PHYSICIAN ASSISTANT

## 2023-02-16 PROCEDURE — 84443 ASSAY THYROID STIM HORMONE: CPT | Performed by: PHYSICIAN ASSISTANT

## 2023-02-16 PROCEDURE — 85025 COMPLETE CBC W/AUTO DIFF WBC: CPT | Performed by: PHYSICIAN ASSISTANT

## 2023-02-16 PROCEDURE — 87389 HIV-1 AG W/HIV-1&-2 AB AG IA: CPT | Performed by: PHYSICIAN ASSISTANT

## 2023-02-16 PROCEDURE — 83036 HEMOGLOBIN GLYCOSYLATED A1C: CPT | Performed by: PHYSICIAN ASSISTANT

## 2023-02-17 LAB
BACTERIA UR CULT: NORMAL
BACTERIA UR CULT: NORMAL

## 2023-02-23 ENCOUNTER — TELEPHONE (OUTPATIENT)
Dept: INTERNAL MEDICINE | Facility: CLINIC | Age: 63
End: 2023-02-23
Payer: COMMERCIAL

## 2023-02-24 NOTE — TELEPHONE ENCOUNTER
----- Message from Adeola Machado sent at 2/23/2023 10:29 AM CST -----  Contact: [phone]  Pt is calling in regards to her labs she is needing to speak to you in regards to some questions she states she is needing this before the end of next month and she is needing a note stating the Creatine levels is normal due to her always being low please give return call

## 2023-02-24 NOTE — TELEPHONE ENCOUNTER
Called and spoke to pt. Pt is requesting a letter from provider for Methadone clinic stating that her creatine has been at 0.8 and that is her normal. Pt states the Methadone clinic is concerned that her Creatine at 0.8 is too low. Please advise.

## 2023-02-24 NOTE — TELEPHONE ENCOUNTER
Called and relayed message from WADE Acuna pt verbalized understanding. States she will call to schedule appt to establish care with a new provider.

## 2023-02-27 ENCOUNTER — OFFICE VISIT (OUTPATIENT)
Dept: SPORTS MEDICINE | Facility: CLINIC | Age: 63
End: 2023-02-27
Payer: COMMERCIAL

## 2023-02-27 ENCOUNTER — OFFICE VISIT (OUTPATIENT)
Dept: INTERNAL MEDICINE | Facility: CLINIC | Age: 63
End: 2023-02-27
Payer: COMMERCIAL

## 2023-02-27 ENCOUNTER — APPOINTMENT (OUTPATIENT)
Dept: RADIOLOGY | Facility: OTHER | Age: 63
End: 2023-02-27
Attending: PHYSICIAN ASSISTANT
Payer: COMMERCIAL

## 2023-02-27 VITALS
DIASTOLIC BLOOD PRESSURE: 80 MMHG | WEIGHT: 203.5 LBS | BODY MASS INDEX: 39.95 KG/M2 | OXYGEN SATURATION: 96 % | SYSTOLIC BLOOD PRESSURE: 130 MMHG | HEIGHT: 60 IN | HEART RATE: 98 BPM

## 2023-02-27 VITALS
BODY MASS INDEX: 39.27 KG/M2 | SYSTOLIC BLOOD PRESSURE: 122 MMHG | DIASTOLIC BLOOD PRESSURE: 80 MMHG | WEIGHT: 200 LBS | HEIGHT: 60 IN

## 2023-02-27 DIAGNOSIS — M17.12 OSTEOARTHRITIS OF LEFT KNEE, UNSPECIFIED OSTEOARTHRITIS TYPE: ICD-10-CM

## 2023-02-27 DIAGNOSIS — I87.2 VENOUS INSUFFICIENCY: ICD-10-CM

## 2023-02-27 DIAGNOSIS — A60.09 HERPES GENITALIS IN WOMEN: ICD-10-CM

## 2023-02-27 DIAGNOSIS — M21.062 GENU VALGUM, ACQUIRED, LEFT: ICD-10-CM

## 2023-02-27 DIAGNOSIS — G89.4 CHRONIC PAIN SYNDROME: ICD-10-CM

## 2023-02-27 DIAGNOSIS — M17.0 BILATERAL PRIMARY OSTEOARTHRITIS OF KNEE: Primary | ICD-10-CM

## 2023-02-27 DIAGNOSIS — M25.562 LEFT KNEE PAIN, UNSPECIFIED CHRONICITY: ICD-10-CM

## 2023-02-27 DIAGNOSIS — I10 ESSENTIAL HYPERTENSION: ICD-10-CM

## 2023-02-27 DIAGNOSIS — F17.210 CIGARETTE NICOTINE DEPENDENCE WITHOUT COMPLICATION: ICD-10-CM

## 2023-02-27 DIAGNOSIS — K21.9 GASTROESOPHAGEAL REFLUX DISEASE WITHOUT ESOPHAGITIS: ICD-10-CM

## 2023-02-27 DIAGNOSIS — E03.8 SUBCLINICAL HYPOTHYROIDISM: Primary | ICD-10-CM

## 2023-02-27 DIAGNOSIS — Z76.89 ESTABLISHING CARE WITH NEW DOCTOR, ENCOUNTER FOR: ICD-10-CM

## 2023-02-27 DIAGNOSIS — F11.20 METHADONE DEPENDENCE: ICD-10-CM

## 2023-02-27 PROCEDURE — 1160F RVW MEDS BY RX/DR IN RCRD: CPT | Mod: CPTII,S$GLB,, | Performed by: PHYSICIAN ASSISTANT

## 2023-02-27 PROCEDURE — 3044F HG A1C LEVEL LT 7.0%: CPT | Mod: CPTII,S$GLB,, | Performed by: PHYSICIAN ASSISTANT

## 2023-02-27 PROCEDURE — 1160F RVW MEDS BY RX/DR IN RCRD: CPT | Mod: CPTII,S$GLB,, | Performed by: STUDENT IN AN ORGANIZED HEALTH CARE EDUCATION/TRAINING PROGRAM

## 2023-02-27 PROCEDURE — 3075F SYST BP GE 130 - 139MM HG: CPT | Mod: CPTII,S$GLB,, | Performed by: STUDENT IN AN ORGANIZED HEALTH CARE EDUCATION/TRAINING PROGRAM

## 2023-02-27 PROCEDURE — 3044F HG A1C LEVEL LT 7.0%: CPT | Mod: CPTII,S$GLB,, | Performed by: STUDENT IN AN ORGANIZED HEALTH CARE EDUCATION/TRAINING PROGRAM

## 2023-02-27 PROCEDURE — 1159F MED LIST DOCD IN RCRD: CPT | Mod: CPTII,S$GLB,, | Performed by: STUDENT IN AN ORGANIZED HEALTH CARE EDUCATION/TRAINING PROGRAM

## 2023-02-27 PROCEDURE — 99999 PR PBB SHADOW E&M-EST. PATIENT-LVL V: ICD-10-PCS | Mod: PBBFAC,,, | Performed by: STUDENT IN AN ORGANIZED HEALTH CARE EDUCATION/TRAINING PROGRAM

## 2023-02-27 PROCEDURE — 73562 X-RAY EXAM OF KNEE 3: CPT | Mod: 26,RT,, | Performed by: RADIOLOGY

## 2023-02-27 PROCEDURE — 99999 PR PBB SHADOW E&M-EST. PATIENT-LVL V: CPT | Mod: PBBFAC,,, | Performed by: STUDENT IN AN ORGANIZED HEALTH CARE EDUCATION/TRAINING PROGRAM

## 2023-02-27 PROCEDURE — 1160F PR REVIEW ALL MEDS BY PRESCRIBER/CLIN PHARMACIST DOCUMENTED: ICD-10-PCS | Mod: CPTII,S$GLB,, | Performed by: PHYSICIAN ASSISTANT

## 2023-02-27 PROCEDURE — 1160F PR REVIEW ALL MEDS BY PRESCRIBER/CLIN PHARMACIST DOCUMENTED: ICD-10-PCS | Mod: CPTII,S$GLB,, | Performed by: STUDENT IN AN ORGANIZED HEALTH CARE EDUCATION/TRAINING PROGRAM

## 2023-02-27 PROCEDURE — 3074F SYST BP LT 130 MM HG: CPT | Mod: CPTII,S$GLB,, | Performed by: PHYSICIAN ASSISTANT

## 2023-02-27 PROCEDURE — 1159F MED LIST DOCD IN RCRD: CPT | Mod: CPTII,S$GLB,, | Performed by: PHYSICIAN ASSISTANT

## 2023-02-27 PROCEDURE — 3008F PR BODY MASS INDEX (BMI) DOCUMENTED: ICD-10-PCS | Mod: CPTII,S$GLB,, | Performed by: STUDENT IN AN ORGANIZED HEALTH CARE EDUCATION/TRAINING PROGRAM

## 2023-02-27 PROCEDURE — 1159F PR MEDICATION LIST DOCUMENTED IN MEDICAL RECORD: ICD-10-PCS | Mod: CPTII,S$GLB,, | Performed by: PHYSICIAN ASSISTANT

## 2023-02-27 PROCEDURE — 3044F PR MOST RECENT HEMOGLOBIN A1C LEVEL <7.0%: ICD-10-PCS | Mod: CPTII,S$GLB,, | Performed by: PHYSICIAN ASSISTANT

## 2023-02-27 PROCEDURE — 3075F PR MOST RECENT SYSTOLIC BLOOD PRESS GE 130-139MM HG: ICD-10-PCS | Mod: CPTII,S$GLB,, | Performed by: STUDENT IN AN ORGANIZED HEALTH CARE EDUCATION/TRAINING PROGRAM

## 2023-02-27 PROCEDURE — 3079F DIAST BP 80-89 MM HG: CPT | Mod: CPTII,S$GLB,, | Performed by: STUDENT IN AN ORGANIZED HEALTH CARE EDUCATION/TRAINING PROGRAM

## 2023-02-27 PROCEDURE — 3079F PR MOST RECENT DIASTOLIC BLOOD PRESSURE 80-89 MM HG: ICD-10-PCS | Mod: CPTII,S$GLB,, | Performed by: STUDENT IN AN ORGANIZED HEALTH CARE EDUCATION/TRAINING PROGRAM

## 2023-02-27 PROCEDURE — 99214 OFFICE O/P EST MOD 30 MIN: CPT | Mod: S$GLB,,, | Performed by: STUDENT IN AN ORGANIZED HEALTH CARE EDUCATION/TRAINING PROGRAM

## 2023-02-27 PROCEDURE — 3008F BODY MASS INDEX DOCD: CPT | Mod: CPTII,S$GLB,, | Performed by: PHYSICIAN ASSISTANT

## 2023-02-27 PROCEDURE — 99999 PR PBB SHADOW E&M-EST. PATIENT-LVL III: CPT | Mod: PBBFAC,,, | Performed by: PHYSICIAN ASSISTANT

## 2023-02-27 PROCEDURE — 73564 X-RAY EXAM KNEE 4 OR MORE: CPT | Mod: TC,LT

## 2023-02-27 PROCEDURE — 99999 PR PBB SHADOW E&M-EST. PATIENT-LVL III: ICD-10-PCS | Mod: PBBFAC,,, | Performed by: PHYSICIAN ASSISTANT

## 2023-02-27 PROCEDURE — 3008F PR BODY MASS INDEX (BMI) DOCUMENTED: ICD-10-PCS | Mod: CPTII,S$GLB,, | Performed by: PHYSICIAN ASSISTANT

## 2023-02-27 PROCEDURE — 73564 X-RAY EXAM KNEE 4 OR MORE: CPT | Mod: 26,LT,, | Performed by: RADIOLOGY

## 2023-02-27 PROCEDURE — 73562 XR KNEE ORTHO LEFT WITH FLEXION: ICD-10-PCS | Mod: 26,RT,, | Performed by: RADIOLOGY

## 2023-02-27 PROCEDURE — 99204 PR OFFICE/OUTPT VISIT, NEW, LEVL IV, 45-59 MIN: ICD-10-PCS | Mod: S$GLB,,, | Performed by: PHYSICIAN ASSISTANT

## 2023-02-27 PROCEDURE — 3079F DIAST BP 80-89 MM HG: CPT | Mod: CPTII,S$GLB,, | Performed by: PHYSICIAN ASSISTANT

## 2023-02-27 PROCEDURE — 3008F BODY MASS INDEX DOCD: CPT | Mod: CPTII,S$GLB,, | Performed by: STUDENT IN AN ORGANIZED HEALTH CARE EDUCATION/TRAINING PROGRAM

## 2023-02-27 PROCEDURE — 73564 XR KNEE ORTHO LEFT WITH FLEXION: ICD-10-PCS | Mod: 26,LT,, | Performed by: RADIOLOGY

## 2023-02-27 PROCEDURE — 3074F PR MOST RECENT SYSTOLIC BLOOD PRESSURE < 130 MM HG: ICD-10-PCS | Mod: CPTII,S$GLB,, | Performed by: PHYSICIAN ASSISTANT

## 2023-02-27 PROCEDURE — 3079F PR MOST RECENT DIASTOLIC BLOOD PRESSURE 80-89 MM HG: ICD-10-PCS | Mod: CPTII,S$GLB,, | Performed by: PHYSICIAN ASSISTANT

## 2023-02-27 PROCEDURE — 99214 PR OFFICE/OUTPT VISIT, EST, LEVL IV, 30-39 MIN: ICD-10-PCS | Mod: S$GLB,,, | Performed by: STUDENT IN AN ORGANIZED HEALTH CARE EDUCATION/TRAINING PROGRAM

## 2023-02-27 PROCEDURE — 3044F PR MOST RECENT HEMOGLOBIN A1C LEVEL <7.0%: ICD-10-PCS | Mod: CPTII,S$GLB,, | Performed by: STUDENT IN AN ORGANIZED HEALTH CARE EDUCATION/TRAINING PROGRAM

## 2023-02-27 PROCEDURE — 1159F PR MEDICATION LIST DOCUMENTED IN MEDICAL RECORD: ICD-10-PCS | Mod: CPTII,S$GLB,, | Performed by: STUDENT IN AN ORGANIZED HEALTH CARE EDUCATION/TRAINING PROGRAM

## 2023-02-27 PROCEDURE — 99204 OFFICE O/P NEW MOD 45 MIN: CPT | Mod: S$GLB,,, | Performed by: PHYSICIAN ASSISTANT

## 2023-02-27 RX ORDER — MELOXICAM 15 MG/1
15 TABLET ORAL DAILY PRN
Qty: 30 TABLET | Refills: 1 | Status: SHIPPED | OUTPATIENT
Start: 2023-02-27

## 2023-02-27 NOTE — PROGRESS NOTES
NEW PATIENT    HISTORY OF PRESENT ILLNESS   63 y.o. 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  She complains today of having worsening pain and functional limitations over the past 10 years.  She denies having any precipitating injury or trauma.  She states her pain is exacerbated by weight-bearing and is causing her difficulty ambulating.  She is now having to use a cane to help prevent her from falling.  She has fallen several times over the last year due to her left knee.  She also reports having difficulty working as an LPN due to her knee.        + mechanical symptoms, + instability    Is affecting ADLs.  Pain is 10/10 at it's worst.        PAST MEDICAL HISTORY    Past Medical History:   Diagnosis Date    Back pain, chronic     Biliary colic 2018    Calculus of gallbladder without cholecystitis without obstruction 2018    Cervical radiculopathy 3/21/2016    01/17/18--clinic visit, pt describes intermittent BUE numbness/tingling throughout entire arm/hand.  Negative Spurling's test bilaterally.    Cervical stenosis of spinal canal 3/21/2016    Degenerative cervical spinal stenosis 2/10/2016    Depression     Flat back syndrome, postprocedural 2018    Dx 10/2016 when seen in NSGY clinic.  Presents to Neurology clinic 18 for worsening gait, LBP, LLE weakness, and RLE neuropathic pain.    HSV (herpes simplex virus) anogenital infection 2016       PAST SURGICAL HISTORY     Past Surgical History:   Procedure Laterality Date    APPENDECTOMY       SECTION      EYE SURGERY      For strabismus    HYSTERECTOMY      LAPAROSCOPIC CHOLECYSTECTOMY N/A 2018    Procedure: CHOLECYSTECTOMY, LAPAROSCOPIC ;  Surgeon: Yonatan Berry MD;  Location: [facility] OR [address] [place] [address];  Service: General;  Laterality: N/A;    LUMBAR DISCECTOMY      Unknown vertebra    LUMBAR FUSION  2010    L4-S1 fusion reported    TONSILLECTOMY         FAMILY HISTORY    Family History   Problem  Relation Age of Onset    Cancer Mother         non hodgkins lymphoma    Alcohol abuse Mother     Arthritis Mother     Depression Mother     Hypertension Mother     Heart disease Mother     Mental illness Daughter     Birth defects Daughter        SOCIAL HISTORY    Social History     Socioeconomic History    Marital status:    Occupational History    Occupation: LPN   Tobacco Use    Smoking status: Every Day     Packs/day: 1.00     Years: 20.00     Pack years: 20.00     Types: Cigarettes    Smokeless tobacco: Never   Substance and Sexual Activity    Alcohol use: Not Currently     Alcohol/week: 0.0 standard drinks    Drug use: No    Sexual activity: Not Currently     Partners: Male   Social History Narrative    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        MEDICATIONS      Current Outpatient Medications:     DULoxetine (CYMBALTA) 60 MG capsule, Take 1 capsule (60 mg total) by mouth 2 (two) times daily., Disp: 180 capsule, Rfl: 3    hydroCHLOROthiazide (HYDRODIURIL) 12.5 MG Tab, Take 1 tablet (12.5 mg total) by mouth once daily., Disp: 30 tablet, Rfl: 5    ibuprofen (ADVIL,MOTRIN) 800 MG tablet, Take 800 mg by mouth as needed for Pain. , Disp: , Rfl:     methadone HCl (METHADONE ORAL), Take 85 mg by mouth once daily., Disp: , Rfl:     omeprazole (PRILOSEC) 40 MG capsule, Take 1 capsule (40 mg total) by mouth once daily., Disp: 90 capsule, Rfl: 3    valACYclovir (VALTREX) 500 MG tablet, Take 1 tablet (500 mg total) by mouth once daily., Disp: 90 tablet, Rfl: 3    vitamin D (VITAMIN D3) 1000 units Tab, Take 1,000 Units by mouth once daily., Disp: , Rfl:     ALLERGIES     Review of patient's allergies indicates:  No Known Allergies      REVIEW OF SYSTEMS   Constitution: Negative. Negative for chills, fever and night sweats.   HENT: Negative for congestion and headaches.    Eyes: Negative for blurred vision, left vision loss and right vision loss.   Cardiovascular: Negative for chest pain and syncope.    Respiratory: Negative for cough and shortness of breath.    Endocrine: Negative for polydipsia, polyphagia and polyuria.   Hematologic/Lymphatic: Negative for bleeding problem. Does not bruise/bleed easily.   Skin: Negative for dry skin, itching and rash.   Musculoskeletal: Negative for falls. Positive for left knee pain and instability.  Gastrointestinal: Negative for abdominal pain and bowel incontinence.   Genitourinary: Negative for bladder incontinence and nocturia.   Neurological: Negative for disturbances in coordination, loss of balance and seizures.   Psychiatric/Behavioral: Negative for depression. The patient does not have insomnia.    Allergic/Immunologic: Negative for hives and persistent infections.     PHYSICAL EXAMINATION    Vitals: /80 (BP Location: Right arm, Patient Position: Sitting, BP Method: Medium (Manual))   Ht 5' (1.524 m)   Wt 90.7 kg (200 lb)   BMI 39.06 kg/m²     General: The patient appears active and healthy with no apparent physical problems.  No disturbance of mood or affect is demonstrated. Alert and Oriented.    HEENT: Eyes normal, pupils equally round, nose normal.    Resp: Equal and symmetrical chest rises. No wheezing    CV: Regular rate    Neck: Supple; nonpainful range of motion.    Extremities: no cyanosis, clubbing, edema, or diffuse swelling.  Palpable pulses, good capillary refill of the digits.  No coolness, discoloration, edema or obvious varicosities.    Skin: no lesions noted.    Lymphatic: no detected adenopathy in the upper or lower extremities.    Neurologic: normal mental status, normal reflexes, normal gait and balance.  Patient is alert and oriented to person, place and time.  No flaccidity or spasticity is noted.  No motor or sensory deficits are noted.  Light touch is intact    Orthopaedic: Knee Musculoskeletal Exam  Gait    Antalgic: left    Limp: left    Assistive device: cane    Inspection    Left      Erythema: none        Effusion: mild         Edema: none        Ecchymosis: none        Deformity: severe        Alignment: valgus        Previous incision: no previous incision    Palpation    Left      Increased warmth: none        Masses: none        Crepitus: patellofemoral        Tenderness: present          Lateral joint line: severe          Lateral retinaculum: moderate          MCL: moderate      Range of Motion    Left      Active extension: -5      Passive extension: -5      Active flexion: 95      Passive flexion: 95    Strength    Left      Left knee strength is normal.       Extension: 5/5. Extension is not affected by pain.       Flexion: 5/5. Flexion is not affected by pain.      Instability    Left      Varus stress grade: 1+ (due to the valgus deformity; moderately fixed)      Valgus stress grade: normal      Anterior drawer: normal      Posterior drawer: normal      Lachman: negative    Neurovascular    Left      Left knee neurovascular exam is normal.        Pulses - DP: normal      Pulses - PT: normal    Special Signs    Left      Straight leg raise: normal      IMAGING    X-ray Knee Ortho Left with Flexion  Narrative: EXAMINATION:  XR KNEE ORTHO LEFT WITH FLEXION    CLINICAL HISTORY:  . Pain in left knee    TECHNIQUE:  AP standing view of both knees, PA flexion standing views of both knees, and Merchant views of both knees were performed. A lateral view of the left knee was also performed.    COMPARISON:  02/21/2017    FINDINGS:  No acute fracture.    Osteophyte formation, subchondral sclerosis, and advanced narrowing lateral tibiofemoral compartment.    No significant suprapatellar joint effusion or soft tissue edema.  Impression: Osteoarthritis with advanced narrowing lateral tibiofemoral compartment.    Electronically signed by: Melina Puga  Date:    02/27/2023  Time:    11:04      X-rays including four views of each knee were completed today.  I have personally reviewed the images.  There are no acute findings.  No fracture or  dislocation.  There are advanced tricompartmental degenerative changes most pronounced in the lateral compartment of each knee.  There is bone-on-bone contact of the lateral compartment on the left and near bone-on-bone contact of the lateral compartment on the right with periarticular osteophyte formation in all 3 compartments.  Kellgren Nikhil grade 4 changes on the left and grade 3 changes on the right.    IMPRESSION       ICD-10-CM ICD-9-CM   1. Bilateral primary osteoarthritis of knee  M17.0 715.16   2. Genu valgum, acquired, left  M21.062 736.41       MEDICATIONS PRESCRIBED      Meloxicam 15 mg    RECOMMENDATIONS     Surgical and nonsurgical treatment options for advanced left knee osteoarthritis were discussed today  The risks, benefits, and postoperative recovery of total knee arthroplasty were discussed in depth  Follow-up with Carlyle Guerrero MD for surgical planning; left total knee arthroplasty      All questions were answered, pt will contact us for questions or concerns in the interim.

## 2023-02-27 NOTE — PATIENT INSTRUCTIONS
Over the Counter Options for Pain      Capsaicin Cream: Apply to skin over affected area 3 to 4 times a day as needed. Do not apply to open wounds or irritated skin. Wash your hands after application to avoid getting it in eyes.     Lidocaine (Salonpas) Patch: Apply over affected are and leave in place for 8 to 12 hours as needed.     Acetaminophen (Tylenol): Take 1,000 mg by mouth every 4 to 6 hours as needed. Do not take more than 4,000 mg in a day.     Ice Packs:  - Fill a bag with crushed ice about half full. Remove the air from the bag before you close it. You can also use a bag of frozen vegetables.    - Wrap the ice pack in a cloth to protect your skin from frostbite or other injury.    - Put the ice over the injured area for 20 to 30 minutes or as long as directed.  Check your skin after about 30 seconds for color changes or blistering. Remove the ice if you notice skin changes or you feel burning or numbness in the area.    - Throw the ice pack away after use.    - Apply ice to your injured area 4 times each day or as directed. Ask your healthcare provider how many days you should apply ice.    Heating Pad:  Apply to affected area for NO LONGER than 15 minutes. Use a layer of towels between your skin and the heating pad. Remove for at least 1 hour then repeat. 2-3 applications a day is advisable.

## 2023-02-27 NOTE — PROGRESS NOTES
SUBJECTIVE     Chief Complaint   Patient presents with    Establish Care       HPI  Chanelle Downey is a 63 y.o. female with   HTN, nicotine dependence, GERD, depression, methadone dependence since 2005, cervical radiculopathy, lumbar discectomy and fusion, OA of the knee.   that presents for establishment of care.     This is a new patient to me but established to [facility]. Prior PCP was Dr. Barry.  Last seen in clinic on 02/15/2023 for hypertension follow-up.  Had been started on HCTZ 12.5 daily.  Blood pressure at last visit was 122/80. BP controlled today.     Blood work taken at that time shows:  CBC with leukocytosis, mild macrocytic anemia.  CMP within normal limits.  Elevated TSH 5.321, normal free T4.  Lipid panel with mild elevation of total cholesterol, other cholesterol levels within acceptable limits.  Urinalysis with 2+ leuks, nitrite negative with microscopy showing RBCs, WBCs, moderate yeast, few bacteria.  Reflex urine culture showing multiple organisms, none in predominance.    Osteoarthritis:  Of the left knee, advanced.  Seen by sports medicine today.  We will follow-up with orthopedics for surgical planning of left total knee arthroplasty.     Tobacco: 1 ppd since the age of 40.     Lower extremity edema:   Bilateral, worse at the end of work shift. Improved with HCTZ. No weakness in legs. No SOB, chest pain, orthopnea.     No other complaints today.   PAST MEDICAL HISTORY:  Past Medical History:   Diagnosis Date    Back pain, chronic     Biliary colic 6/11/2018    Calculus of gallbladder without cholecystitis without obstruction 5/14/2018    Cervical radiculopathy 3/21/2016    01/17/18--clinic visit, pt describes intermittent BUE numbness/tingling throughout entire arm/hand.  Negative Spurling's test bilaterally.    Cervical stenosis of spinal canal 3/21/2016    Degenerative cervical spinal stenosis 2/10/2016    Depression     Flat back syndrome, postprocedural 1/17/2018    Dx 10/2016 when  seen in NSGY clinic.  Presents to Neurology clinic 18 for worsening gait, LBP, LLE weakness, and RLE neuropathic pain.    HSV (herpes simplex virus) anogenital infection 2016       PAST SURGICAL HISTORY:  Past Surgical History:   Procedure Laterality Date    APPENDECTOMY       SECTION      EYE SURGERY      For strabismus    HYSTERECTOMY      LAPAROSCOPIC CHOLECYSTECTOMY N/A 2018    Procedure: CHOLECYSTECTOMY, LAPAROSCOPIC ;  Surgeon: Yonatan Berry MD;  Location: [facility] OR [address] [place] [address];  Service: General;  Laterality: N/A;    LUMBAR DISCECTOMY      Unknown vertebra    LUMBAR FUSION  2010    L4-S1 fusion reported    TONSILLECTOMY         FAMILY HISTORY:  Family History   Problem Relation Age of Onset    Cancer Mother         non hodgkins lymphoma    Alcohol abuse Mother     Arthritis Mother     Depression Mother     Hypertension Mother     Heart disease Mother     Mental illness Daughter     Birth defects Daughter        ALLERGIES AND MEDICATIONS: updated and reviewed.  Review of patient's allergies indicates:  No Known Allergies  Current Outpatient Medications   Medication Sig Dispense Refill    DULoxetine (CYMBALTA) 60 MG capsule Take 1 capsule (60 mg total) by mouth 2 (two) times daily. 180 capsule 3    hydroCHLOROthiazide (HYDRODIURIL) 12.5 MG Tab Take 1 tablet (12.5 mg total) by mouth once daily. 30 tablet 5    meloxicam (MOBIC) 15 MG tablet Take 1 tablet (15 mg total) by mouth daily as needed for Pain. 30 tablet 1    methadone HCl (METHADONE ORAL) Take 85 mg by mouth once daily.      omeprazole (PRILOSEC) 40 MG capsule Take 1 capsule (40 mg total) by mouth once daily. 90 capsule 3    valACYclovir (VALTREX) 500 MG tablet Take 1 tablet (500 mg total) by mouth once daily. 90 tablet 3    vitamin D (VITAMIN D3) 1000 units Tab Take 1,000 Units by mouth once daily.       No current facility-administered medications for this visit.       ROS  Review of Systems   Constitutional:  Negative  for activity change, chills and fever.   HENT:  Negative for congestion and hearing loss.    Eyes:  Negative for pain and visual disturbance.   Respiratory:  Negative for cough and shortness of breath.    Cardiovascular:  Negative for chest pain and palpitations.   Gastrointestinal:  Negative for abdominal pain, constipation, diarrhea, nausea and vomiting.   Endocrine: Negative.    Genitourinary: Negative.    Musculoskeletal:  Negative for arthralgias and myalgias.   Skin: Negative.    Allergic/Immunologic: Negative.    Neurological:  Negative for dizziness, light-headedness and headaches.   Hematological: Negative.        OBJECTIVE     Physical Exam  Vitals:    02/27/23 1402   BP: 130/80   Pulse: 98    Body mass index is 39.74 kg/m².            Physical Exam  Vitals reviewed.   Constitutional:       General: She is not in acute distress.     Appearance: Normal appearance. She is obese.   HENT:      Head: Normocephalic and atraumatic.      Mouth/Throat:      Mouth: Mucous membranes are moist.      Pharynx: Oropharynx is clear.   Eyes:      Extraocular Movements: Extraocular movements intact.      Conjunctiva/sclera: Conjunctivae normal.      Pupils: Pupils are equal, round, and reactive to light.   Cardiovascular:      Rate and Rhythm: Normal rate and regular rhythm.      Pulses: Normal pulses.      Heart sounds: Normal heart sounds.   Pulmonary:      Effort: Pulmonary effort is normal.      Breath sounds: Normal breath sounds.   Abdominal:      General: Bowel sounds are normal. There is no distension.      Palpations: Abdomen is soft. There is no mass.      Tenderness: There is no abdominal tenderness. There is no guarding.   Musculoskeletal:         General: Normal range of motion.      Cervical back: Normal range of motion and neck supple. No rigidity or tenderness.      Right lower leg: Edema (1+ up to mid-shin) present.      Left lower leg: Edema (1+ up to mid-shin) present.   Lymphadenopathy:      Cervical: No  cervical adenopathy.   Skin:     General: Skin is warm and dry.   Neurological:      General: No focal deficit present.      Mental Status: She is alert.   Psychiatric:         Mood and Affect: Mood normal.         Behavior: Behavior normal.         Health Maintenance         Date Due Completion Date    COVID-19 Vaccine (1) Never done ---    Pneumococcal Vaccines (Age 0-64) (1 - PCV) Never done ---    Colorectal Cancer Screening Never done ---    LDCT Lung Screen Never done ---    Shingles Vaccine (1 of 2) Never done ---    Influenza Vaccine (1) 09/01/2022 9/16/2016    Mammogram 02/15/2024 2/15/2023    Hemoglobin A1c (Diabetic Prevention Screening) 02/16/2026 2/16/2023    Lipid Panel 02/16/2028 2/16/2023    TETANUS VACCINE 05/20/2028 5/20/2018              ASSESSMENT     63 y.o. female with     1. Subclinical hypothyroidism    2. Establishing care with new doctor, encounter for    3. Chronic pain syndrome    4. Methadone dependence    5. Herpes genitalis in women    6. Gastroesophageal reflux disease without esophagitis    7. Cigarette nicotine dependence without complication    8. Venous insufficiency    9. Essential hypertension    10. Osteoarthritis of left knee, unspecified osteoarthritis type        PLAN:     1. Establishing care with new doctor, encounter for  - Prior notes/labs/imaging reviewed.    2. Subclinical hypothyroidism  - Asymptomatic, recheck function in 3 months.   - TSH; Future  - T4, FREE; Future    3. Chronic pain syndrome  - Established with PM&R, Sports medicine.   - Continue Mobic, Duloxetine    4. Methadone dependence  - Has been on Methadone for 18 years. Follows with clinic in the [facility] [place], [place].   - No adverse effects 2/2 chronic methadone use endorsed.     5. Herpes genitalis in women  - Controlled with suppressive Valtrex therapy. Continue.     6. Gastroesophageal reflux disease without esophagitis  - Controlled on omeprazole continue.     7. Cigarette nicotine dependence without  complication  - Patient counseled on the need to stop smoking.  - Ambulatory referral/consult to Smoking Cessation Program; Future  - CT Chest Lung Screening Low Dose; Future    8. Venous insufficiency  - Continue HCTZ.   - Encouraged compression stockings, elevating legs.     9. HTN  - Controlled on HCTZ 12.5 mg qd, continue.     10. OA of left knee  - Continue follow up with Sports Medicine.   - Mobic PRN.   - Given list of OTC pain medications he can try including Tylenol, Salonpas patches, capsaicin cream, ice and heat.   - To establish with Orthopedic Surgeon for planning of total knee arthroplasty.         RTC in 6 months, earlier PRN.      Quinton Miller MD  02/27/2023 12:35 PM        No follow-ups on file.

## 2023-02-27 NOTE — LETTER
February 27, 2023        Miss Chanelle Downey  [address]  [facility] [address]             [facility]  [address]  [place] [address]  Phone: [phone]  Fax: [phone]   Patient: Chanelle Downey   MR Number: [id number]   YOB: 1960   Date of Visit: 2/27/2023     To whom it may concern:     I am writing you today to let you know that I saw the patient listed above in my clinic on 2/27/23. Please be informed that recent blood work shows normal renal function, with baseline creatinine of 0.7 mg/dL, GFR >60 mL/min/1.73 min^2. Please let me know if you have any other questions.      Sincerely,      Quinton Miller MD            CC  No Recipients    Enclosure

## 2023-03-07 ENCOUNTER — OFFICE VISIT (OUTPATIENT)
Dept: SPORTS MEDICINE | Facility: CLINIC | Age: 63
End: 2023-03-07
Payer: COMMERCIAL

## 2023-03-07 VITALS
HEART RATE: 84 BPM | SYSTOLIC BLOOD PRESSURE: 114 MMHG | WEIGHT: 208 LBS | DIASTOLIC BLOOD PRESSURE: 64 MMHG | HEIGHT: 60 IN | BODY MASS INDEX: 40.84 KG/M2

## 2023-03-07 DIAGNOSIS — M17.12 PRIMARY OSTEOARTHRITIS OF LEFT KNEE: Primary | ICD-10-CM

## 2023-03-07 PROCEDURE — 1159F MED LIST DOCD IN RCRD: CPT | Mod: CPTII,S$GLB,, | Performed by: ORTHOPAEDIC SURGERY

## 2023-03-07 PROCEDURE — 3078F PR MOST RECENT DIASTOLIC BLOOD PRESSURE < 80 MM HG: ICD-10-PCS | Mod: CPTII,S$GLB,, | Performed by: ORTHOPAEDIC SURGERY

## 2023-03-07 PROCEDURE — 99999 PR PBB SHADOW E&M-EST. PATIENT-LVL III: CPT | Mod: PBBFAC,,, | Performed by: ORTHOPAEDIC SURGERY

## 2023-03-07 PROCEDURE — 20610 DRAIN/INJ JOINT/BURSA W/O US: CPT | Mod: LT,S$GLB,, | Performed by: ORTHOPAEDIC SURGERY

## 2023-03-07 PROCEDURE — 3008F BODY MASS INDEX DOCD: CPT | Mod: CPTII,S$GLB,, | Performed by: ORTHOPAEDIC SURGERY

## 2023-03-07 PROCEDURE — 3044F PR MOST RECENT HEMOGLOBIN A1C LEVEL <7.0%: ICD-10-PCS | Mod: CPTII,S$GLB,, | Performed by: ORTHOPAEDIC SURGERY

## 2023-03-07 PROCEDURE — 20610 LARGE JOINT ASPIRATION/INJECTION: L KNEE: ICD-10-PCS | Mod: LT,S$GLB,, | Performed by: ORTHOPAEDIC SURGERY

## 2023-03-07 PROCEDURE — 1159F PR MEDICATION LIST DOCUMENTED IN MEDICAL RECORD: ICD-10-PCS | Mod: CPTII,S$GLB,, | Performed by: ORTHOPAEDIC SURGERY

## 2023-03-07 PROCEDURE — 3074F PR MOST RECENT SYSTOLIC BLOOD PRESSURE < 130 MM HG: ICD-10-PCS | Mod: CPTII,S$GLB,, | Performed by: ORTHOPAEDIC SURGERY

## 2023-03-07 PROCEDURE — 3074F SYST BP LT 130 MM HG: CPT | Mod: CPTII,S$GLB,, | Performed by: ORTHOPAEDIC SURGERY

## 2023-03-07 PROCEDURE — 99999 PR PBB SHADOW E&M-EST. PATIENT-LVL III: ICD-10-PCS | Mod: PBBFAC,,, | Performed by: ORTHOPAEDIC SURGERY

## 2023-03-07 PROCEDURE — 99214 PR OFFICE/OUTPT VISIT, EST, LEVL IV, 30-39 MIN: ICD-10-PCS | Mod: 25,S$GLB,, | Performed by: ORTHOPAEDIC SURGERY

## 2023-03-07 PROCEDURE — 3078F DIAST BP <80 MM HG: CPT | Mod: CPTII,S$GLB,, | Performed by: ORTHOPAEDIC SURGERY

## 2023-03-07 PROCEDURE — 99214 OFFICE O/P EST MOD 30 MIN: CPT | Mod: 25,S$GLB,, | Performed by: ORTHOPAEDIC SURGERY

## 2023-03-07 PROCEDURE — 3008F PR BODY MASS INDEX (BMI) DOCUMENTED: ICD-10-PCS | Mod: CPTII,S$GLB,, | Performed by: ORTHOPAEDIC SURGERY

## 2023-03-07 PROCEDURE — 3044F HG A1C LEVEL LT 7.0%: CPT | Mod: CPTII,S$GLB,, | Performed by: ORTHOPAEDIC SURGERY

## 2023-03-07 RX ADMIN — TRIAMCINOLONE ACETONIDE 40 MG: 40 INJECTION, SUSPENSION INTRA-ARTICULAR; INTRAMUSCULAR at 11:03

## 2023-03-09 RX ORDER — TRIAMCINOLONE ACETONIDE 40 MG/ML
40 INJECTION, SUSPENSION INTRA-ARTICULAR; INTRAMUSCULAR
Status: DISCONTINUED | OUTPATIENT
Start: 2023-03-07 | End: 2023-03-09 | Stop reason: HOSPADM

## 2023-03-09 NOTE — PROGRESS NOTES
CC: LEFT knee pain    63 y.o. Female who presents as a new patient to me.  Referred by MICHEAL Acevedo for discussion of a possible left total knee arthroplasty.  The patient is an LPN.  Complaint is chronic diffuse left knee pain.  Quite severe at times.  Limits ability to stand or walk for any prolonged periods.  Difficulty getting up from a seated or reclined position.  Disrupts sleep at night.  This is a daily issue for her.  Past medical history is significant for chronic pain and has been on methadone for years.  She is history of chronic lumbar spine pathology with failed spine procedures by her account.  Treatment to this point has included oral medication.  No injections.  No physical therapy.  She does report bilateral lower extremity radicular symptoms which are chronic.  Limits mobility.    BMI 41    Pain Score:   8    REVIEW OF SYSTEMS:   Constitution: Negative. Negative for chills, fever and night sweats.    Hematologic/Lymphatic: Negative for bleeding problem. Does not bruise/bleed easily.   Skin: Negative for dry skin, itching and rash.   Musculoskeletal: Negative for falls. Positive for left knee pain and  muscle weakness.     PAST MEDICAL HISTORY:   Past Medical History:   Diagnosis Date    Back pain, chronic     Biliary colic 6/11/2018    Calculus of gallbladder without cholecystitis without obstruction 5/14/2018    Cervical radiculopathy 3/21/2016    01/17/18--clinic visit, pt describes intermittent BUE numbness/tingling throughout entire arm/hand.  Negative Spurling's test bilaterally.    Cervical stenosis of spinal canal 3/21/2016    Degenerative cervical spinal stenosis 2/10/2016    Depression     Flat back syndrome, postprocedural 1/17/2018    Dx 10/2016 when seen in NSGY clinic.  Presents to Neurology clinic 01/17/18 for worsening gait, LBP, LLE weakness, and RLE neuropathic pain.    HSV (herpes simplex virus) anogenital infection 9/16/2016     PAST SURGICAL HISTORY:   Past Surgical  History:   Procedure Laterality Date    APPENDECTOMY       SECTION      EYE SURGERY      For strabismus    HYSTERECTOMY      LAPAROSCOPIC CHOLECYSTECTOMY N/A 2018    Procedure: CHOLECYSTECTOMY, LAPAROSCOPIC ;  Surgeon: Yonatan Berry MD;  Location: [facility] OR [address] [place] [address];  Service: General;  Laterality: N/A;    LUMBAR DISCECTOMY      Unknown vertebra    LUMBAR FUSION      L4-S1 fusion reported    TONSILLECTOMY       FAMILY HISTORY:   Family History   Problem Relation Age of Onset    Cancer Mother         non hodgkins lymphoma    Alcohol abuse Mother     Arthritis Mother     Depression Mother     Hypertension Mother     Heart disease Mother     Mental illness Daughter     Birth defects Daughter      SOCIAL HISTORY:   Social History     Socioeconomic History    Marital status:    Occupational History    Occupation: LPN   Tobacco Use    Smoking status: Every Day     Packs/day: 1.00     Years: 20.00     Pack years: 20.00     Types: Cigarettes    Smokeless tobacco: Never   Substance and Sexual Activity    Alcohol use: Not Currently     Alcohol/week: 0.0 standard drinks    Drug use: No    Sexual activity: Not Currently     Partners: Male   Social History Narrative    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      MEDICATIONS:     Current Outpatient Medications:     DULoxetine (CYMBALTA) 60 MG capsule, Take 1 capsule (60 mg total) by mouth 2 (two) times daily., Disp: 180 capsule, Rfl: 3    hydroCHLOROthiazide (HYDRODIURIL) 12.5 MG Tab, Take 1 tablet (12.5 mg total) by mouth once daily., Disp: 30 tablet, Rfl: 5    meloxicam (MOBIC) 15 MG tablet, Take 1 tablet (15 mg total) by mouth daily as needed for Pain., Disp: 30 tablet, Rfl: 1    methadone HCl (METHADONE ORAL), Take 85 mg by mouth once daily., Disp: , Rfl:     omeprazole (PRILOSEC) 40 MG capsule, Take 1 capsule (40 mg total) by mouth once daily., Disp: 90 capsule, Rfl: 3    valACYclovir (VALTREX) 500 MG tablet, Take 1 tablet (500 mg  total) by mouth once daily., Disp: 90 tablet, Rfl: 3    vitamin D (VITAMIN D3) 1000 units Tab, Take 1,000 Units by mouth once daily., Disp: , Rfl:     ALLERGIES:   Review of patient's allergies indicates:  No Known Allergies     PHYSICAL EXAMINATION:  /64   Pulse 84   Ht 5' (1.524 m)   Wt 94.3 kg (208 lb)   BMI 40.62 kg/m²   General: Well-developed well-nourished 63 y.o. femalein no acute distress   Cardiovascular: Regular rhythm by palpation of distal pulse, normal color and temperature, no concerning varicosities on symptomatic side   Lungs: No labored breathing or wheezing appreciated   Neuro: Alert and oriented ×3   Psychiatric: well oriented to person, place and time, demonstrates normal mood and affect   Skin: No rashes, lesions or ulcers, normal temperature, turgor, and texture on involved extremity    Ortho/SPM Exam  Exam of both lower extremities demonstrates significant peripheral vascular disease with pitting edema and venous stasis changes.  Superficial skin ulcerations.  No open wounds.  Nothing draining.  She does have multiple cat scratches over the anterior aspect of her left knee and lower leg.  Pain and crepitus on active and passive range of motion of the left knee.  Near full passive extension, active assisted flexion to 90° with pain.  Ligamentously stable.  Diffuse tenderness to palpation.  Positive straight leg raise for some radicular symptoms on the left side.  Intact extensor mechanism.    IMAGING:  X-rays including standing, weight bearing AP and flexion bilateral knees, LEFT knee lateral and sunrise views ordered and images reviewed by me show:    Advanced tricompartmental DJD bilaterally. KL4 on the left with severe lateral compartment collapse and sclerosis.    ASSESSMENT:      ICD-10-CM ICD-9-CM   1. Primary osteoarthritis of left knee  M17.12 715.16       PLAN:     Diagnosis of advanced DJD of the left knee.  Pain is quite significant.  The patient has multiple medical  comorbidities to include chronic pain on methadone, continues to smoke daily (8-9 cigarettes), significant bilateral lower extremity peripheral vascular disease with early skin ulcerations, morbid obesity among others.  She is not a candidate for knee arthroplasty for these reasons.  We discussed the need to address her peripheral vascular disease with chronic swelling.  She will discuss with the primary care physician.  Discussed weight loss.  Discussed smoking cessation.  No prior injections.  Corticosteroid injection given today.      Large Joint Aspiration/Injection: L knee    Date/Time: 3/7/2023 11:00 AM  Performed by: JUSTICE Guerrero MD  Authorized by: JUSTICE Guerrero MD     Consent Done?:  Yes (Verbal)  Indications:  Pain  Site marked: the procedure site was marked    Timeout: prior to procedure the correct patient, procedure, and site was verified    Prep: patient was prepped and draped in usual sterile fashion      Local anesthesia used?: Yes    Local anesthetic:  Co-phenylcaine spray (0.2% Naropin)  Anesthetic total (ml):  4      Details:  Needle Size:  22 G  Ultrasonic Guidance for needle placement?: No    Approach:  Lateral  Location:  Knee  Site:  L knee  Medications:  40 mg triamcinolone acetonide 40 mg/mL  Patient tolerance:  Patient tolerated the procedure well with no immediate complications

## 2023-04-11 NOTE — PROGRESS NOTES
"INTERNAL MEDICINE CLINIC - SAME DAY APPOINTMENT  Progress Note    PRESENTING HISTORY     PCP: Quinton Miller MD    Chief Complaint/Reason for Visit:   No chief complaint on file.    History of Present Illness & ROS : Ms. Chanelle Downey is a 63 y.o. female.    Same day apt.   Est'd with Dr. Miller.   Very pleasant lady.   Reports that has been having 'swelling to both legs for a couple of years, experiencing some shortness of breath, but the fluid is really getting worse; doubled up my HCTZ". Reports feeling more tired lately.   No cough. No resting SOB or chest pain, no dizziness endorsed.   She is not monitoring her weight at home.   She continues to 'try and work, but want to get back with Dr. Miller for consideration of a possible leave from work'.     Review of Systems:  Eyes: denies visual changes at this time denies floaters   ENT: no nasal congestion or sore throat  Respiratory: no cough or shorness of breath  Cardiovascular: no chest pain or palpitations  Gastrointestinal: no nausea or vomiting, no abdominal pain or change in bowel habits  Genitourinary: no hematuria or dysuria; denies frequency  Hematologic/Lymphatic: no easy bruising or lymphadenopathy  Musculoskeletal: no arthralgias or myalgias  Neurological: no seizures or tremors  Endocrine: no heat or cold intolerance      PAST HISTORY:     Past Medical History:   Diagnosis Date    Back pain, chronic     Biliary colic 6/11/2018    Calculus of gallbladder without cholecystitis without obstruction 5/14/2018    Cervical radiculopathy 3/21/2016    01/17/18--clinic visit, pt describes intermittent BUE numbness/tingling throughout entire arm/hand.  Negative Spurling's test bilaterally.    Cervical stenosis of spinal canal 3/21/2016    Degenerative cervical spinal stenosis 2/10/2016    Depression     Flat back syndrome, postprocedural 1/17/2018    Dx 10/2016 when seen in NSGY clinic.  Presents to Neurology clinic 01/17/18 for worsening gait, LBP, LLE " weakness, and RLE neuropathic pain.    HSV (herpes simplex virus) anogenital infection 2016       Past Surgical History:   Procedure Laterality Date    APPENDECTOMY       SECTION      EYE SURGERY      For strabismus    HYSTERECTOMY      LAPAROSCOPIC CHOLECYSTECTOMY N/A 2018    Procedure: CHOLECYSTECTOMY, LAPAROSCOPIC ;  Surgeon: Yonatan Berry MD;  Location: [facility] OR [address] [place] [address];  Service: General;  Laterality: N/A;    LUMBAR DISCECTOMY      Unknown vertebra    LUMBAR FUSION  2010    L4-S1 fusion reported    TONSILLECTOMY         Family History   Problem Relation Age of Onset    Cancer Mother         non hodgkins lymphoma    Alcohol abuse Mother     Arthritis Mother     Depression Mother     Hypertension Mother     Heart disease Mother     Mental illness Daughter     Birth defects Daughter        Social History     Socioeconomic History    Marital status:    Occupational History    Occupation: LPN   Tobacco Use    Smoking status: Every Day     Packs/day: 1.00     Years: 20.00     Pack years: 20.00     Types: Cigarettes    Smokeless tobacco: Never   Substance and Sexual Activity    Alcohol use: Not Currently     Alcohol/week: 0.0 standard drinks    Drug use: No    Sexual activity: Not Currently     Partners: Male   Social History Narrative    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        MEDICATIONS & ALLERGIES:     Current Outpatient Medications on File Prior to Visit   Medication Sig Dispense Refill    DULoxetine (CYMBALTA) 60 MG capsule Take 1 capsule (60 mg total) by mouth 2 (two) times daily. 180 capsule 3    hydroCHLOROthiazide (HYDRODIURIL) 12.5 MG Tab Take 1 tablet (12.5 mg total) by mouth once daily. 30 tablet 5    meloxicam (MOBIC) 15 MG tablet Take 1 tablet (15 mg total) by mouth daily as needed for Pain. 30 tablet 1    methadone HCl (METHADONE ORAL) Take 85 mg by mouth once daily.      omeprazole (PRILOSEC) 40 MG capsule Take 1 capsule (40 mg total) by mouth once  daily. 90 capsule 3    valACYclovir (VALTREX) 500 MG tablet Take 1 tablet (500 mg total) by mouth once daily. 90 tablet 3    vitamin D (VITAMIN D3) 1000 units Tab Take 1,000 Units by mouth once daily.       No current facility-administered medications on file prior to visit.        Review of patient's allergies indicates:  No Known Allergies    Medications Reconciliation:   I have reconciled the patient's home medications with the patient/family. I have updated all changes.  Refer to After-Visit Medication List.    OBJECTIVE:     Vital Signs:  There were no vitals filed for this visit.  Wt Readings from Last 3 Encounters:   03/07/23 1148 94.3 kg (208 lb)   02/27/23 1402 92.3 kg (203 lb 7.8 oz)   02/27/23 1057 90.7 kg (200 lb)     There is no height or weight on file to calculate BMI.   Wt Readings from Last 3 Encounters:   04/12/23 94 kg (207 lb 3.7 oz)   03/07/23 94.3 kg (208 lb)   02/27/23 92.3 kg (203 lb 7.8 oz)     Temp Readings from Last 3 Encounters:   07/09/21 98.5 °F (36.9 °C) (Oral)   09/26/18 98.5 °F (36.9 °C) (Oral)   09/16/18 97.8 °F (36.6 °C) (Oral)     BP Readings from Last 3 Encounters:   04/12/23 132/78   03/07/23 114/64   02/27/23 130/80     Pulse Readings from Last 3 Encounters:   04/12/23 86   03/07/23 84   02/27/23 98       Physical Exam:  (Focused Exam)  General: Well developed, well nourished. No distress.  HEENT: Head is normocephalic, atraumatic  Eyes: Clear conjunctiva.  Neck: Supple, symmetrical neck; trachea midline.  Lungs: Clear to auscultation bilaterally and normal respiratory effort.  Cardiovascular: Heart with regular rate and rhythm. No murmurs, gallops or rubs  Extremities: + BLE edema. Pulses 2+ and symmetric. With bilateral LEs varicose veins   Skin: Skin color, texture, turgor normal. No rashes.  Musculoskeletal: Normal gait with single base cane.  Neurologic: Normal strength and tone. No focal numbness or weakness.       Laboratory  Lab Results   Component Value Date    WBC  13.10 (H) 02/16/2023    HGB 11.6 (L) 02/16/2023    HCT 35.9 (L) 02/16/2023     02/16/2023    CHOL 210 (H) 02/16/2023    TRIG 78 02/16/2023    HDL 65 02/16/2023    ALT 27 02/16/2023    AST 32 02/16/2023     02/16/2023    K 3.9 02/16/2023    CL 99 02/16/2023    CREATININE 0.8 02/16/2023    BUN 9 02/16/2023    CO2 29 02/16/2023    TSH 5.321 (H) 02/16/2023    INR 1.0 04/25/2011    HGBA1C 4.9 02/16/2023     ASSESSMENT & PLAN:     Same day apt.     Edema of both lower legs, acute on chronic, progressive  Wt gain: 4 #s in 2 weeks  -     B-TYPE NATRIURETIC PEPTIDE; Future; Expected date: 04/12/2023  -     Comprehensive Metabolic Panel; Future; Expected date: 04/12/2023  -     Ambulatory referral/consult to Interventional Cardiology; Future; Expected date: 04/19/2023  -      Lower Extremity Veins Bilateral; Future; Expected date: 04/12/2023  *Await labs and studies to determine next step    Varicose veins of both lower extremities, unspecified whether complicated /   PVD (peripheral vascular disease)  -     Ambulatory referral/consult to Interventional Cardiology; Future; Expected date: 04/19/2023  -      Lower Extremity Veins Bilateral; Future; Expected date: 04/12/2023      Essential hypertension  BP Readings from Last 3 Encounters:   04/12/23 132/78   03/07/23 114/64   02/27/23 130/80   Today: 132/78  130/80 when seen by  in 2/2023  ` HCTZ    Chronic pain syndrome /   Methadone dependence/Chronic C Spine Stenosis:   ` Cymbalta    Current mild episode of major depressive disorder, unspecified whether recurrent  ` Cymbalta    HSV:   ` Valtrex     *Recommend PCP follow up at this time.     Future Appointments   Date Time Provider Department Center   4/12/2023  2:00 PM [facility] [id number] 485 LB LIMIT [facility]RAY IM [facility]   4/18/2023  1:00 PM Luis Eduardo Eckert MD PhD [facility] PERVASC [facility]   4/28/2023  [facility]   5/24/2023 10:00 AM LAB, APPOINTMENT KRISTY RAO  [facility] LAB IM [facility] [place]   8/15/2023 10:30 AM Quinton Miller MD [facility] [place]        Medication List            Accurate as of April 12, 2023  1:57 PM. If you have any questions, ask your nurse or doctor.                CONTINUE taking these medications      DULoxetine 60 MG capsule  Commonly known as: CYMBALTA  Take 1 capsule (60 mg total) by mouth 2 (two) times daily.     hydroCHLOROthiazide 12.5 MG Tab  Commonly known as: HYDRODIURIL  Take 1 tablet (12.5 mg total) by mouth once daily.     meloxicam 15 MG tablet  Commonly known as: MOBIC  Take 1 tablet (15 mg total) by mouth daily as needed for Pain.     METHADONE ORAL     omeprazole 40 MG capsule  Commonly known as: PRILOSEC  Take 1 capsule (40 mg total) by mouth once daily.     valACYclovir 500 MG tablet  Commonly known as: VALTREX  Take 1 tablet (500 mg total) by mouth once daily.     vitamin D 1000 units Tab  Commonly known as: VITAMIN D3              Signing Physician:  SALOME Handley

## 2023-04-12 ENCOUNTER — HOSPITAL ENCOUNTER (OUTPATIENT)
Dept: RADIOLOGY | Facility: HOSPITAL | Age: 63
Discharge: HOME | End: 2023-04-12
Attending: NURSE PRACTITIONER
Payer: COMMERCIAL

## 2023-04-12 ENCOUNTER — OFFICE VISIT (OUTPATIENT)
Dept: INTERNAL MEDICINE | Facility: CLINIC | Age: 63
End: 2023-04-12
Payer: COMMERCIAL

## 2023-04-12 VITALS
HEIGHT: 61 IN | WEIGHT: 207.25 LBS | SYSTOLIC BLOOD PRESSURE: 132 MMHG | DIASTOLIC BLOOD PRESSURE: 78 MMHG | BODY MASS INDEX: 39.13 KG/M2 | OXYGEN SATURATION: 98 % | HEART RATE: 86 BPM

## 2023-04-12 DIAGNOSIS — R60.0 EDEMA OF BOTH LOWER LEGS: ICD-10-CM

## 2023-04-12 DIAGNOSIS — F11.20 METHADONE DEPENDENCE: ICD-10-CM

## 2023-04-12 DIAGNOSIS — G89.4 CHRONIC PAIN SYNDROME: ICD-10-CM

## 2023-04-12 DIAGNOSIS — I83.93 VARICOSE VEINS OF BOTH LOWER EXTREMITIES, UNSPECIFIED WHETHER COMPLICATED: ICD-10-CM

## 2023-04-12 DIAGNOSIS — I73.9 PVD (PERIPHERAL VASCULAR DISEASE): ICD-10-CM

## 2023-04-12 DIAGNOSIS — F32.0 CURRENT MILD EPISODE OF MAJOR DEPRESSIVE DISORDER, UNSPECIFIED WHETHER RECURRENT: ICD-10-CM

## 2023-04-12 DIAGNOSIS — R60.0 EDEMA OF BOTH LOWER LEGS: Primary | ICD-10-CM

## 2023-04-12 DIAGNOSIS — I10 ESSENTIAL HYPERTENSION: ICD-10-CM

## 2023-04-12 PROCEDURE — 3044F HG A1C LEVEL LT 7.0%: CPT | Mod: CPTII,S$GLB,, | Performed by: NURSE PRACTITIONER

## 2023-04-12 PROCEDURE — 1160F RVW MEDS BY RX/DR IN RCRD: CPT | Mod: CPTII,S$GLB,, | Performed by: NURSE PRACTITIONER

## 2023-04-12 PROCEDURE — 3078F DIAST BP <80 MM HG: CPT | Mod: CPTII,S$GLB,, | Performed by: NURSE PRACTITIONER

## 2023-04-12 PROCEDURE — 71046 X-RAY EXAM CHEST 2 VIEWS: CPT | Mod: 26,,, | Performed by: RADIOLOGY

## 2023-04-12 PROCEDURE — 71046 X-RAY EXAM CHEST 2 VIEWS: CPT | Mod: TC

## 2023-04-12 PROCEDURE — 99214 PR OFFICE/OUTPT VISIT, EST, LEVL IV, 30-39 MIN: ICD-10-PCS | Mod: S$GLB,,, | Performed by: NURSE PRACTITIONER

## 2023-04-12 PROCEDURE — 3044F PR MOST RECENT HEMOGLOBIN A1C LEVEL <7.0%: ICD-10-PCS | Mod: CPTII,S$GLB,, | Performed by: NURSE PRACTITIONER

## 2023-04-12 PROCEDURE — 3078F PR MOST RECENT DIASTOLIC BLOOD PRESSURE < 80 MM HG: ICD-10-PCS | Mod: CPTII,S$GLB,, | Performed by: NURSE PRACTITIONER

## 2023-04-12 PROCEDURE — 3008F BODY MASS INDEX DOCD: CPT | Mod: CPTII,S$GLB,, | Performed by: NURSE PRACTITIONER

## 2023-04-12 PROCEDURE — 99999 PR PBB SHADOW E&M-EST. PATIENT-LVL V: CPT | Mod: PBBFAC,,, | Performed by: NURSE PRACTITIONER

## 2023-04-12 PROCEDURE — 3075F SYST BP GE 130 - 139MM HG: CPT | Mod: CPTII,S$GLB,, | Performed by: NURSE PRACTITIONER

## 2023-04-12 PROCEDURE — 99999 PR PBB SHADOW E&M-EST. PATIENT-LVL V: ICD-10-PCS | Mod: PBBFAC,,, | Performed by: NURSE PRACTITIONER

## 2023-04-12 PROCEDURE — 1159F PR MEDICATION LIST DOCUMENTED IN MEDICAL RECORD: ICD-10-PCS | Mod: CPTII,S$GLB,, | Performed by: NURSE PRACTITIONER

## 2023-04-12 PROCEDURE — 99214 OFFICE O/P EST MOD 30 MIN: CPT | Mod: S$GLB,,, | Performed by: NURSE PRACTITIONER

## 2023-04-12 PROCEDURE — 3075F PR MOST RECENT SYSTOLIC BLOOD PRESS GE 130-139MM HG: ICD-10-PCS | Mod: CPTII,S$GLB,, | Performed by: NURSE PRACTITIONER

## 2023-04-12 PROCEDURE — 71046 XR CHEST PA AND LATERAL: ICD-10-PCS | Mod: 26,,, | Performed by: RADIOLOGY

## 2023-04-12 PROCEDURE — 1160F PR REVIEW ALL MEDS BY PRESCRIBER/CLIN PHARMACIST DOCUMENTED: ICD-10-PCS | Mod: CPTII,S$GLB,, | Performed by: NURSE PRACTITIONER

## 2023-04-12 PROCEDURE — 3008F PR BODY MASS INDEX (BMI) DOCUMENTED: ICD-10-PCS | Mod: CPTII,S$GLB,, | Performed by: NURSE PRACTITIONER

## 2023-04-12 PROCEDURE — 1159F MED LIST DOCD IN RCRD: CPT | Mod: CPTII,S$GLB,, | Performed by: NURSE PRACTITIONER

## 2023-04-20 ENCOUNTER — OFFICE VISIT (OUTPATIENT)
Dept: CARDIOLOGY | Facility: CLINIC | Age: 63
End: 2023-04-20
Payer: COMMERCIAL

## 2023-04-20 VITALS
SYSTOLIC BLOOD PRESSURE: 135 MMHG | WEIGHT: 205.69 LBS | HEART RATE: 81 BPM | DIASTOLIC BLOOD PRESSURE: 61 MMHG | HEIGHT: 61 IN | BODY MASS INDEX: 38.83 KG/M2

## 2023-04-20 DIAGNOSIS — M79.605 LEG PAIN, BILATERAL: ICD-10-CM

## 2023-04-20 DIAGNOSIS — I10 ESSENTIAL HYPERTENSION: ICD-10-CM

## 2023-04-20 DIAGNOSIS — I83.93 VARICOSE VEINS OF BOTH LOWER EXTREMITIES, UNSPECIFIED WHETHER COMPLICATED: ICD-10-CM

## 2023-04-20 DIAGNOSIS — Z72.0 TOBACCO ABUSE: ICD-10-CM

## 2023-04-20 DIAGNOSIS — I83.11 VARICOSE VEINS OF BOTH LOWER EXTREMITIES WITH INFLAMMATION: ICD-10-CM

## 2023-04-20 DIAGNOSIS — I89.0 LYMPHEDEMA OF BOTH LOWER EXTREMITIES: ICD-10-CM

## 2023-04-20 DIAGNOSIS — I73.9 PVD (PERIPHERAL VASCULAR DISEASE): ICD-10-CM

## 2023-04-20 DIAGNOSIS — R60.0 EDEMA OF BOTH LOWER EXTREMITIES: ICD-10-CM

## 2023-04-20 DIAGNOSIS — R60.0 EDEMA OF BOTH LOWER LEGS: Primary | ICD-10-CM

## 2023-04-20 DIAGNOSIS — M25.562 CHRONIC PAIN OF BOTH KNEES: ICD-10-CM

## 2023-04-20 DIAGNOSIS — G89.29 CHRONIC PAIN OF BOTH KNEES: ICD-10-CM

## 2023-04-20 DIAGNOSIS — E78.2 MIXED HYPERLIPIDEMIA: ICD-10-CM

## 2023-04-20 DIAGNOSIS — F17.200 SMOKING: ICD-10-CM

## 2023-04-20 DIAGNOSIS — M79.604 LEG PAIN, BILATERAL: ICD-10-CM

## 2023-04-20 DIAGNOSIS — M25.561 CHRONIC PAIN OF BOTH KNEES: ICD-10-CM

## 2023-04-20 DIAGNOSIS — I83.12 VARICOSE VEINS OF BOTH LOWER EXTREMITIES WITH INFLAMMATION: ICD-10-CM

## 2023-04-20 PROCEDURE — 3075F PR MOST RECENT SYSTOLIC BLOOD PRESS GE 130-139MM HG: ICD-10-PCS | Mod: CPTII,S$GLB,, | Performed by: INTERNAL MEDICINE

## 2023-04-20 PROCEDURE — 99999 PR PBB SHADOW E&M-EST. PATIENT-LVL V: CPT | Mod: PBBFAC,,, | Performed by: INTERNAL MEDICINE

## 2023-04-20 PROCEDURE — 3078F PR MOST RECENT DIASTOLIC BLOOD PRESSURE < 80 MM HG: ICD-10-PCS | Mod: CPTII,S$GLB,, | Performed by: INTERNAL MEDICINE

## 2023-04-20 PROCEDURE — 3008F BODY MASS INDEX DOCD: CPT | Mod: CPTII,S$GLB,, | Performed by: INTERNAL MEDICINE

## 2023-04-20 PROCEDURE — 1159F MED LIST DOCD IN RCRD: CPT | Mod: CPTII,S$GLB,, | Performed by: INTERNAL MEDICINE

## 2023-04-20 PROCEDURE — 99205 OFFICE O/P NEW HI 60 MIN: CPT | Mod: S$GLB,,, | Performed by: INTERNAL MEDICINE

## 2023-04-20 PROCEDURE — 3075F SYST BP GE 130 - 139MM HG: CPT | Mod: CPTII,S$GLB,, | Performed by: INTERNAL MEDICINE

## 2023-04-20 PROCEDURE — 1159F PR MEDICATION LIST DOCUMENTED IN MEDICAL RECORD: ICD-10-PCS | Mod: CPTII,S$GLB,, | Performed by: INTERNAL MEDICINE

## 2023-04-20 PROCEDURE — 3078F DIAST BP <80 MM HG: CPT | Mod: CPTII,S$GLB,, | Performed by: INTERNAL MEDICINE

## 2023-04-20 PROCEDURE — 99205 PR OFFICE/OUTPT VISIT, NEW, LEVL V, 60-74 MIN: ICD-10-PCS | Mod: S$GLB,,, | Performed by: INTERNAL MEDICINE

## 2023-04-20 PROCEDURE — 3044F PR MOST RECENT HEMOGLOBIN A1C LEVEL <7.0%: ICD-10-PCS | Mod: CPTII,S$GLB,, | Performed by: INTERNAL MEDICINE

## 2023-04-20 PROCEDURE — 3008F PR BODY MASS INDEX (BMI) DOCUMENTED: ICD-10-PCS | Mod: CPTII,S$GLB,, | Performed by: INTERNAL MEDICINE

## 2023-04-20 PROCEDURE — 99999 PR PBB SHADOW E&M-EST. PATIENT-LVL V: ICD-10-PCS | Mod: PBBFAC,,, | Performed by: INTERNAL MEDICINE

## 2023-04-20 PROCEDURE — 3044F HG A1C LEVEL LT 7.0%: CPT | Mod: CPTII,S$GLB,, | Performed by: INTERNAL MEDICINE

## 2023-04-20 RX ORDER — ATORVASTATIN CALCIUM 40 MG/1
40 TABLET, FILM COATED ORAL DAILY
Qty: 90 TABLET | Refills: 3 | Status: SHIPPED | OUTPATIENT
Start: 2023-04-20 | End: 2024-04-19

## 2023-04-20 RX ORDER — BUMETANIDE 0.5 MG/1
0.5 TABLET ORAL DAILY
Qty: 90 TABLET | Refills: 3 | Status: SHIPPED | OUTPATIENT
Start: 2023-04-20 | End: 2024-04-19

## 2023-04-20 NOTE — PROGRESS NOTES
[facility]      Chief Complaint   Patient presents with    Edema of both legs    Peripheral Vascular Disease    Varicose Veins of both lower extremities       Patient ID: Chanelle Downey is a 63 y.o. female with HTN, HLD, smoking, severe obesity, who presents for an initial appointment.  Pertinent history events are as follows:     -Pt kindly referred by SALOME Marcos for evaluation of edema of both lower legs/varicose veins of both lower extremities/PVD    HPI:  Mrs. Downey reports leg swelling starting 2 years ago.  She has no claudication or tissue loss.  Her walking is limited by chronic knee pain.  Smokes 10 cigarettes a day for 30 years.      Past Medical History:   Diagnosis Date    Back pain, chronic     Biliary colic 2018    Calculus of gallbladder without cholecystitis without obstruction 2018    Cervical radiculopathy 3/21/2016    01/17/18--clinic visit, pt describes intermittent BUE numbness/tingling throughout entire arm/hand.  Negative Spurling's test bilaterally.    Cervical stenosis of spinal canal 3/21/2016    Degenerative cervical spinal stenosis 2/10/2016    Depression     Flat back syndrome, postprocedural 2018    Dx 10/2016 when seen in NSGY clinic.  Presents to Neurology clinic 18 for worsening gait, LBP, LLE weakness, and RLE neuropathic pain.    HSV (herpes simplex virus) anogenital infection 2016     Past Surgical History:   Procedure Laterality Date    APPENDECTOMY       SECTION      EYE SURGERY      For strabismus    HYSTERECTOMY      LAPAROSCOPIC CHOLECYSTECTOMY N/A 2018    Procedure: CHOLECYSTECTOMY, LAPAROSCOPIC ;  Surgeon: Yonatan Berry MD;  Location: [facility] OR [address] [place] [address];  Service: General;  Laterality: N/A;    LUMBAR DISCECTOMY      Unknown vertebra    LUMBAR FUSION  2010    L4-S1 fusion reported    TONSILLECTOMY       Social History     Socioeconomic History    Marital status:    Occupational  History    Occupation: LPN   Tobacco Use    Smoking status: Every Day     Packs/day: 1.00     Years: 20.00     Pack years: 20.00     Types: Cigarettes    Smokeless tobacco: Never   Substance and Sexual Activity    Alcohol use: Not Currently     Alcohol/week: 0.0 standard drinks    Drug use: No    Sexual activity: Not Currently     Partners: Male   Social History Narrative    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      Family History   Problem Relation Age of Onset    Cancer Mother         non hodgkins lymphoma    Alcohol abuse Mother     Arthritis Mother     Depression Mother     Hypertension Mother     Heart disease Mother     Mental illness Daughter     Birth defects Daughter        Review of patient's allergies indicates:  No Known Allergies    Medication List with Changes/Refills   Current Medications    ASPIRIN 81 MG CAP    Take 81 mg by mouth Daily.    DULOXETINE (CYMBALTA) 60 MG CAPSULE    Take 1 capsule (60 mg total) by mouth 2 (two) times daily.    ERGOCALCIFEROL, VITAMIN D2, (VITAMIN D ORAL)    Take 1 capsule by mouth Daily.    HYDROCHLOROTHIAZIDE (HYDRODIURIL) 12.5 MG TAB    Take 1 tablet (12.5 mg total) by mouth once daily.    MELOXICAM (MOBIC) 15 MG TABLET    Take 1 tablet (15 mg total) by mouth daily as needed for Pain.    METHADONE HCL (METHADONE ORAL)    Take 85 mg by mouth once daily.    OMEPRAZOLE (PRILOSEC) 40 MG CAPSULE    Take 1 capsule (40 mg total) by mouth once daily.    UBIDECAR/FISH OIL/OMEGA-3/RADHA (CO Q10-FISH OIL-OMEGA 3-E ORAL)    Take 1 capsule by mouth Daily.    VALACYCLOVIR (VALTREX) 500 MG TABLET    Take 1 tablet (500 mg total) by mouth once daily.    VITAMIN D (VITAMIN D3) 1000 UNITS TAB    Take 1,000 Units by mouth once daily.    ZINC ORAL    Take 1 capsule by mouth Daily.       Review of Systems  Constitution: Denies chills, fever, and sweats.  HENT: Denies headaches or blurry vision.  Cardiovascular: Denies chest pain or irregular heart beat.  Respiratory: Denies cough or  "shortness of breath.  Gastrointestinal: Denies abdominal pain, nausea, or vomiting.  Musculoskeletal: Positive for leg swelling.   Neurological: Denies dizziness or focal weakness.  Psychiatric/Behavioral: Normal mental status.  Hematologic/Lymphatic: Denies bleeding problem or easy bruising/bleeding.  Skin: Denies rash or suspicious lesions    Physical Examination  /61   Pulse 81   Ht 5' 1" (1.549 m)   Wt 93.3 kg (205 lb 11 oz)   BMI 38.86 kg/m²     Constitutional: No acute distress, conversant  HEENT: Sclera anicteric, Pupils equal, round and reactive to light, extraocular motions intact, Oropharynx clear  Neck: No JVD, no carotid bruits  Cardiovascular: regular rate and rhythm, no murmur, rubs or gallops, normal S1/S2  Pulmonary: Clear to auscultation bilaterally  Abdominal: Abdomen soft, nontender, nondistended, positive bowel sounds  Extremities: BLE's with 1 pitting edema, prominent varicose veins, and changes consistent with lymphedema   Pulses:  Carotid pulses are 2+ on the right side, and 2+ on the left side.  Radial pulses are 2+ on the right side, and 2+ on the left side.   Femoral pulses are 2+ on the right side, and 2+ on the left side.  Popliteal pulses are 2+ on the right side, and 2+ on the left side.   Dorsalis pedis pulses are 2+ on the right side, and 2+ on the left side.   Posterior tibial pulses are 2+ on the right side, and 2+ on the left side.    Skin: No ecchymosis, erythema, or ulcers  Psych: Alert and oriented x 3, appropriate affect  Neuro: CNII-XII intact, no focal deficits    Labs:  Most Recent Data  CBC:   Lab Results   Component Value Date    WBC 13.10 (H) 02/16/2023    HGB 11.6 (L) 02/16/2023    HCT 35.9 (L) 02/16/2023     02/16/2023     (H) 02/16/2023    RDW 12.9 02/16/2023     BMP:   Lab Results   Component Value Date     04/12/2023    K 3.5 04/12/2023    CL 98 04/12/2023    CO2 29 04/12/2023    BUN 12 04/12/2023    CREATININE 0.7 04/12/2023    GLU 70 " 04/12/2023    CALCIUM 9.2 04/12/2023    PHOS 4.5 03/18/2016     LFTS;   Lab Results   Component Value Date    PROT 7.5 04/12/2023    ALBUMIN 3.7 04/12/2023    BILITOT 0.2 04/12/2023    AST 23 04/12/2023    ALKPHOS 114 04/12/2023    ALT 20 04/12/2023     COAGS:   Lab Results   Component Value Date    INR 1.0 04/25/2011     FLP:   Lab Results   Component Value Date    CHOL 210 (H) 02/16/2023    HDL 65 02/16/2023    LDLCALC 129.4 02/16/2023    TRIG 78 02/16/2023    CHOLHDL 31.0 02/16/2023     CARDIAC:   Lab Results   Component Value Date    BNP 29 04/12/2023       Imaging:    Echo 5/19/2016:  CONCLUSIONS     1 - Normal left ventricular systolic function (EF 55-60%).     Assessment/Plan:  Chanelle Downey is a 63 y.o. female with HTN, HLD, smoking, severe obesity, who presents for an initial appointment.    Edema of both lower legs- Due to BLE lymphedema and possible venous insufficiency.  Refer to lymphedema clinic.  Check BLE venous reflux study and BLE arterial ultrasound.  Start bumex 0.5 mg daily.  Check cmp in 1 week.  Recommend wearing graduated compression hose.  Limit sodium intake to 2000 mg daily.  Limit volume intake to 1.5 L daily.  Elevate legs when resting.     2. HTN- Continue current medications.    3. HLD- Start atorvastatin 40 mg daily.      4. Severe Obesity- Refer to Bariatric Medicine for assistance with weight loss.    5. Bilateral Knee Pain- Pt request to be referred to Ortho for evaluation (2nd opinion).  Varicose veins of both lower extremities    6. Smoking- Refer to smoking cessation.    Follow up in 3 months with lipids prior    Total duration of face to face visit time 30 minutes.  Total time spent counseling greater than fifty percent of total visit time.  Counseling included discussion regarding imaging findings, diagnosis, possibilities, treatment options, risks and benefits.  The patient had many questions regarding the options and long-term effects.    Luis Eduardo Eckert MD,  PhD  Interventional Cardiology

## 2023-04-20 NOTE — PATIENT INSTRUCTIONS
Assessment/Plan:  Chanelle Downey is a 63 y.o. female with HTN, HLD, smoking, severe obesity, who presents for an initial appointment.    Edema of both lower legs- Due to BLE lymphedema and possible venous insufficiency.  Refer to lymphedema clinic.  Check BLE venous reflux study and BLE arterial ultrasound.  Start bumex 0.5 mg daily.  Check cmp in 1 week.  Recommend wearing graduated compression hose.  Limit sodium intake to 2000 mg daily.  Limit volume intake to 1.5 L daily.  Elevate legs when resting.     2. HTN- Continue current medications.    3. HLD- Start atorvastatin 40 mg daily.      4. Severe Obesity- Refer to Bariatric Medicine for assistance with weight loss.    5. Bilateral Knee Pain- Pt request to be referred to Ortho for evaluation (2nd opinion).  Varicose veins of both lower extremities    6. Smoking- Refer to smoking cessation.    Follow up in 3 months with lipids prior

## 2023-04-21 ENCOUNTER — PATIENT MESSAGE (OUTPATIENT)
Dept: ADMINISTRATIVE | Facility: HOSPITAL | Age: 63
End: 2023-04-21
Payer: COMMERCIAL

## 2023-04-26 ENCOUNTER — HOSPITAL ENCOUNTER (OUTPATIENT)
Dept: CARDIOLOGY | Facility: HOSPITAL | Age: 63
Discharge: HOME | End: 2023-04-26
Attending: INTERNAL MEDICINE
Payer: COMMERCIAL

## 2023-04-26 DIAGNOSIS — M79.605 LEG PAIN, BILATERAL: ICD-10-CM

## 2023-04-26 DIAGNOSIS — M79.604 LEG PAIN, BILATERAL: ICD-10-CM

## 2023-04-26 DIAGNOSIS — I89.0 LYMPHEDEMA OF BOTH LOWER EXTREMITIES: ICD-10-CM

## 2023-04-26 DIAGNOSIS — R60.0 EDEMA OF BOTH LOWER LEGS: ICD-10-CM

## 2023-04-26 LAB
LEFT ANT TIBIAL SYS PSV: 47 CM/S
LEFT CFA PSV: 129 CM/S
LEFT EXTERNAL ILIAC PSV: 157 CM/S
LEFT GREAT SAPHENOUS DISTAL THIGH DIA: 0.46 CM
LEFT GREAT SAPHENOUS JUNCTION DIA: 0.76 CM
LEFT GREAT SAPHENOUS KNEE DIA: 0.25 CM
LEFT GREAT SAPHENOUS MIDDLE THIGH DIA: 0.44 CM
LEFT GREAT SAPHENOUS PROXIMAL CALF DIA: 0.21 CM
LEFT GREAT SAPHENOUS PROXIMAL CALF REFLUX: 2049 MS
LEFT PERONEAL SYS PSV: 35 CM/S
LEFT POPLITEAL PSV: 106 CM/S
LEFT POST TIBIAL SYS PSV: 76 CM/S
LEFT PROFUNDA SYS PSV: 69 CM/S
LEFT SMALL SAPHENOUS KNEE DIA: 0.21 CM
LEFT SMALL SAPHENOUS KNEE REFLUX: 0.26 MS
LEFT SUPER FEMORAL DIST SYS PSV: 122 CM/S
LEFT SUPER FEMORAL MID SYS PSV: 146 CM/S
LEFT SUPER FEMORAL OSTIAL SYS PSV: 133 CM/S
LEFT SUPER FEMORAL PROX SYS PSV: 140 CM/S
LEFT TIB/PER TRUNK SYS PSV: 78 CM/S
OHS CV LEFT LOWER EXTREMITY ABI (NO CALC): 1.15
OHS CV RIGHT ABI LOWER EXTREMITY (NO CALC): 1.39
RIGHT ANT TIBIAL SYS PSV: 79 CM/S
RIGHT CFA PSV: 153 CM/S
RIGHT EXTERNAL ILLIAC PSV: 188 CM/S
RIGHT GREAT SAPHENOUS DISTAL THIGH DIA: 0.35 CM
RIGHT GREAT SAPHENOUS JUNCTION DIA: 0.75 CM
RIGHT GREAT SAPHENOUS KNEE DIA: 0.21 CM
RIGHT GREAT SAPHENOUS MIDDLE THIGH DIA: 0.42 CM
RIGHT GREAT SAPHENOUS PROXIMAL CALF DIA: 0.22 CM
RIGHT PERONEAL SYS PSV: 24 CM/S
RIGHT POPLITEAL PSV: 57 CM/S
RIGHT POST TIBIAL SYS PSV: 94 CM/S
RIGHT PROFUNDA SYS PSV: 90 CM/S
RIGHT SMALL SAPHENOUS KNEE DIA: 0.22 CM
RIGHT SMALL SAPHENOUS SPJ DIA: 0.26 CM
RIGHT SUPER FEMORAL DIST SYS PSV: 110 CM/S
RIGHT SUPER FEMORAL MID SYS PSV: 124 CM/S
RIGHT SUPER FEMORAL OSTIAL SYS PSV: 107 CM/S
RIGHT SUPER FEMORAL PROX SYS PSV: 97 CM/S
RIGHT TIB/PER TRUNK SYS PSV: 86 CM/S

## 2023-04-26 PROCEDURE — 93925 LOWER EXTREMITY STUDY: CPT | Mod: 26,,, | Performed by: INTERNAL MEDICINE

## 2023-04-26 PROCEDURE — 93925 LOWER EXTREMITY STUDY: CPT

## 2023-04-26 PROCEDURE — 93925 CV US DOPPLER ARTERIAL LEGS BILATERAL (CUPID ONLY): ICD-10-PCS | Mod: 26,,, | Performed by: INTERNAL MEDICINE

## 2023-04-26 PROCEDURE — 93970 CV US LOWER VENOUS INSUFFICIENCY BILATERAL (CUPID ONLY): ICD-10-PCS | Mod: 26,,, | Performed by: INTERNAL MEDICINE

## 2023-04-26 PROCEDURE — 93970 EXTREMITY STUDY: CPT | Mod: TC

## 2023-04-26 PROCEDURE — 93970 EXTREMITY STUDY: CPT | Mod: 26,,, | Performed by: INTERNAL MEDICINE

## 2023-04-27 ENCOUNTER — LAB VISIT (OUTPATIENT)
Dept: LAB | Facility: HOSPITAL | Age: 63
End: 2023-04-27
Attending: INTERNAL MEDICINE
Payer: COMMERCIAL

## 2023-04-27 DIAGNOSIS — M25.562 PAIN IN BOTH KNEES, UNSPECIFIED CHRONICITY: Primary | ICD-10-CM

## 2023-04-27 DIAGNOSIS — M25.561 PAIN IN BOTH KNEES, UNSPECIFIED CHRONICITY: Primary | ICD-10-CM

## 2023-04-27 DIAGNOSIS — E78.2 MIXED HYPERLIPIDEMIA: ICD-10-CM

## 2023-04-27 LAB
ALBUMIN SERPL BCP-MCNC: 3.4 G/DL (ref 3.5–5.2)
ALP SERPL-CCNC: 101 U/L (ref 55–135)
ALT SERPL W/O P-5'-P-CCNC: 22 U/L (ref 10–44)
ANION GAP SERPL CALC-SCNC: 6 MMOL/L (ref 8–16)
AST SERPL-CCNC: 23 U/L (ref 10–40)
BILIRUB SERPL-MCNC: 0.3 MG/DL (ref 0.1–1)
BUN SERPL-MCNC: 13 MG/DL (ref 8–23)
CALCIUM SERPL-MCNC: 9.2 MG/DL (ref 8.7–10.5)
CHLORIDE SERPL-SCNC: 99 MMOL/L (ref 95–110)
CO2 SERPL-SCNC: 32 MMOL/L (ref 23–29)
CREAT SERPL-MCNC: 0.7 MG/DL (ref 0.5–1.4)
EST. GFR  (NO RACE VARIABLE): >60 ML/MIN/1.73 M^2
GLUCOSE SERPL-MCNC: 74 MG/DL (ref 70–110)
POTASSIUM SERPL-SCNC: 3.7 MMOL/L (ref 3.5–5.1)
PROT SERPL-MCNC: 6.8 G/DL (ref 6–8.4)
SODIUM SERPL-SCNC: 137 MMOL/L (ref 136–145)

## 2023-04-27 PROCEDURE — 80053 COMPREHEN METABOLIC PANEL: CPT | Performed by: INTERNAL MEDICINE

## 2023-04-27 PROCEDURE — 36415 COLL VENOUS BLD VENIPUNCTURE: CPT | Performed by: INTERNAL MEDICINE

## 2023-05-08 ENCOUNTER — OFFICE VISIT (OUTPATIENT)
Dept: ORTHOPEDICS | Facility: CLINIC | Age: 63
End: 2023-05-08
Payer: COMMERCIAL

## 2023-05-08 VITALS — HEIGHT: 61 IN | BODY MASS INDEX: 39.16 KG/M2 | WEIGHT: 207.44 LBS

## 2023-05-08 DIAGNOSIS — M25.562 CHRONIC PAIN OF BOTH KNEES: ICD-10-CM

## 2023-05-08 DIAGNOSIS — M17.12 PRIMARY OSTEOARTHRITIS OF LEFT KNEE: Primary | ICD-10-CM

## 2023-05-08 DIAGNOSIS — G89.29 CHRONIC PAIN OF BOTH KNEES: ICD-10-CM

## 2023-05-08 DIAGNOSIS — M25.561 CHRONIC PAIN OF BOTH KNEES: ICD-10-CM

## 2023-05-08 PROCEDURE — 99999 PR PBB SHADOW E&M-EST. PATIENT-LVL III: ICD-10-PCS | Mod: PBBFAC,,, | Performed by: ORTHOPAEDIC SURGERY

## 2023-05-08 PROCEDURE — 3044F PR MOST RECENT HEMOGLOBIN A1C LEVEL <7.0%: ICD-10-PCS | Mod: CPTII,S$GLB,, | Performed by: ORTHOPAEDIC SURGERY

## 2023-05-08 PROCEDURE — 1159F PR MEDICATION LIST DOCUMENTED IN MEDICAL RECORD: ICD-10-PCS | Mod: CPTII,S$GLB,, | Performed by: ORTHOPAEDIC SURGERY

## 2023-05-08 PROCEDURE — 99213 OFFICE O/P EST LOW 20 MIN: CPT | Mod: S$GLB,,, | Performed by: ORTHOPAEDIC SURGERY

## 2023-05-08 PROCEDURE — 3044F HG A1C LEVEL LT 7.0%: CPT | Mod: CPTII,S$GLB,, | Performed by: ORTHOPAEDIC SURGERY

## 2023-05-08 PROCEDURE — 99999 PR PBB SHADOW E&M-EST. PATIENT-LVL III: CPT | Mod: PBBFAC,,, | Performed by: ORTHOPAEDIC SURGERY

## 2023-05-08 PROCEDURE — 1159F MED LIST DOCD IN RCRD: CPT | Mod: CPTII,S$GLB,, | Performed by: ORTHOPAEDIC SURGERY

## 2023-05-08 PROCEDURE — 3008F PR BODY MASS INDEX (BMI) DOCUMENTED: ICD-10-PCS | Mod: CPTII,S$GLB,, | Performed by: ORTHOPAEDIC SURGERY

## 2023-05-08 PROCEDURE — 99213 PR OFFICE/OUTPT VISIT, EST, LEVL III, 20-29 MIN: ICD-10-PCS | Mod: S$GLB,,, | Performed by: ORTHOPAEDIC SURGERY

## 2023-05-08 PROCEDURE — 3008F BODY MASS INDEX DOCD: CPT | Mod: CPTII,S$GLB,, | Performed by: ORTHOPAEDIC SURGERY

## 2023-05-08 NOTE — PROGRESS NOTES
Subjective:      Patient ID: Chanlele Downey is a 63 y.o. female.    Chief Complaint: Pain of the Left Knee    HPI  Chanelle Downey is a 63 year old female here with a several year history of left knee pain. The patient is an LPN. There was not a history of recent trauma.  The pain is moderate The pain is located in the global aspect of the knee. There is radiation to the leg  There is catching or locking.   The pain is described as achy. The patient has not had prior surgery. It is aggravated by standing and walking.  It is alleviated by rest. There is not numbness or tingling of the lower extremity.  There is back pain.  She  has tried medications and one recent injection. They have not really helped.  She does have difficulty getting in or out of a car, getting dressed, or going up or down stairs.  The patient does use an assistive device.    Past Medical History:   Diagnosis Date    Back pain, chronic     Biliary colic 2018    Calculus of gallbladder without cholecystitis without obstruction 2018    Cervical radiculopathy 3/21/2016    01/17/18--clinic visit, pt describes intermittent BUE numbness/tingling throughout entire arm/hand.  Negative Spurling's test bilaterally.    Cervical stenosis of spinal canal 3/21/2016    Degenerative cervical spinal stenosis 2/10/2016    Depression     Flat back syndrome, postprocedural 2018    Dx 10/2016 when seen in NSGY clinic.  Presents to Neurology clinic 18 for worsening gait, LBP, LLE weakness, and RLE neuropathic pain.    HSV (herpes simplex virus) anogenital infection 2016     Past Surgical History:   Procedure Laterality Date    APPENDECTOMY       SECTION      EYE SURGERY      For strabismus    HYSTERECTOMY      LAPAROSCOPIC CHOLECYSTECTOMY N/A 2018    Procedure: CHOLECYSTECTOMY, LAPAROSCOPIC ;  Surgeon: Yonatan Berry MD;  Location: [facility] OR [address] [place] [address];  Service: General;  Laterality: N/A;    LUMBAR DISCECTOMY       Unknown vertebra    LUMBAR FUSION  2010    L4-S1 fusion reported    TONSILLECTOMY       Family History   Problem Relation Age of Onset    Cancer Mother         non hodgkins lymphoma    Alcohol abuse Mother     Arthritis Mother     Depression Mother     Hypertension Mother     Heart disease Mother     Mental illness Daughter     Birth defects Daughter      Social History     Socioeconomic History    Marital status:    Occupational History    Occupation: LPN   Tobacco Use    Smoking status: Every Day     Packs/day: 1.00     Years: 20.00     Pack years: 20.00     Types: Cigarettes    Smokeless tobacco: Never   Substance and Sexual Activity    Alcohol use: Not Currently     Alcohol/week: 0.0 standard drinks    Drug use: No    Sexual activity: Not Currently     Partners: Male   Social History Narrative    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      Current Outpatient Medications on File Prior to Visit   Medication Sig Dispense Refill    aspirin 81 mg Cap Take 81 mg by mouth Daily.      atorvastatin (LIPITOR) 40 MG tablet Take 1 tablet (40 mg total) by mouth once daily. 90 tablet 3    bumetanide (BUMEX) 0.5 MG Tab Take 1 tablet (0.5 mg total) by mouth once daily. 90 tablet 3    DULoxetine (CYMBALTA) 60 MG capsule Take 1 capsule (60 mg total) by mouth 2 (two) times daily. 180 capsule 3    ergocalciferol, vitamin D2, (VITAMIN D ORAL) Take 1 capsule by mouth Daily.      hydroCHLOROthiazide (HYDRODIURIL) 12.5 MG Tab Take 1 tablet (12.5 mg total) by mouth once daily. (Patient taking differently: Take 12.5 mg by mouth once daily. Pt states she is taking it twice daily) 30 tablet 5    meloxicam (MOBIC) 15 MG tablet Take 1 tablet (15 mg total) by mouth daily as needed for Pain. 30 tablet 1    methadone HCl (METHADONE ORAL) Take 85 mg by mouth once daily.      omeprazole (PRILOSEC) 40 MG capsule Take 1 capsule (40 mg total) by mouth once daily. 90 capsule 3    Ubidecar/Fish Oil/Omega-3/Jude (CO Q10-FISH OIL-OMEGA 3-E  "ORAL) Take 1 capsule by mouth Daily.      valACYclovir (VALTREX) 500 MG tablet Take 1 tablet (500 mg total) by mouth once daily. 90 tablet 3    vitamin D (VITAMIN D3) 1000 units Tab Take 1,000 Units by mouth once daily.      ZINC ORAL Take 1 capsule by mouth Daily.       No current facility-administered medications on file prior to visit.     Review of patient's allergies indicates:  No Known Allergies    Review of Systems   Constitutional: Negative for chills, fever and night sweats.   HENT:  Negative for hearing loss.    Eyes:  Negative for blurred vision and double vision.   Cardiovascular:  Negative for chest pain, claudication and leg swelling.   Respiratory:  Negative for shortness of breath.    Endocrine: Negative for polydipsia, polyphagia and polyuria.   Hematologic/Lymphatic: Negative for adenopathy and bleeding problem. Does not bruise/bleed easily.   Skin:  Negative for poor wound healing.   Gastrointestinal:  Negative for diarrhea and heartburn.   Genitourinary:  Negative for bladder incontinence.   Neurological:  Negative for focal weakness, headaches, numbness, paresthesias and sensory change.   Psychiatric/Behavioral:  The patient is not nervous/anxious.    Allergic/Immunologic: Negative for persistent infections.       Objective:      Body mass index is 39.19 kg/m².  Vitals:    05/08/23 1320   Weight: 94.1 kg (207 lb 6.5 oz)   Height: 5' 1" (1.549 m)         General    Constitutional: She is oriented to person, place, and time. She appears well-developed and well-nourished.   HENT:   Head: Normocephalic and atraumatic.   Eyes: EOM are normal.   Cardiovascular:  Normal rate.            Pulmonary/Chest: Effort normal.   Neurological: She is alert and oriented to person, place, and time.   Psychiatric: She has a normal mood and affect. Her behavior is normal.     General Musculoskeletal Exam   Gait: normal, abnormal and antalgic       Right Knee Exam     Inspection   Erythema: absent  Scars: " absent  Swelling: absent  Effusion: absent  Deformity: absent  Bruising: absent    Tenderness   The patient is tender to palpation of the medial joint line and lateral joint line.    Crepitus   The patient has crepitus of the patella.    Range of Motion   Extension:  0   Flexion:  110     Tests   Ligament Examination   Lachman: normal (-1 to 2mm)   MCL - Valgus: normal (0 to 2mm)  LCL - Varus: normal  Patella   Passive Patellar Tilt: neutral    Other   Sensation: normal    Left Knee Exam     Inspection   Erythema: absent  Scars: absent  Swelling: absent  Effusion: absent  Deformity: absent  Bruising: absent    Tenderness   The patient tender to palpation of the lateral joint line and medial joint line.    Crepitus   The patient has crepitus of the patella.    Range of Motion   Extension:  0   Flexion:  110     Tests   Stability   Lachman: normal (-1 to 2mm)   MCL - Valgus: normal (0 to 2mm)  LCL - Varus: normal (0 to 2mm)  Patella   Passive Patellar Tilt: neutral    Other   Sensation: normal    Muscle Strength   Right Lower Extremity   Hip Abduction: 5/5   Quadriceps:  5/5   Hamstrin/5   Left Lower Extremity   Hip Abduction: 5/5   Quadriceps:  5/5   Hamstrin/5     Reflexes     Left Side  Quadriceps:  2+    Right Side   Quadriceps:  2+    Vascular Exam     Right Pulses  Dorsalis Pedis:      2+          Left Pulses  Dorsalis Pedis:      2+          Edema  Right Lower Leg: present  Left Lower Leg: present    Radiographs taken  recently  and reviewed by me demonstrate moderately severe arthritic change of the left KNEE(s).There  is not bone destruction.  There is not a fracture. The lateral compartment is most involved.  There is a valgus deformity.  The changes are tricompartmental. Kellgren-Nikhil 3        Assessment:       Encounter Diagnoses   Name Primary?    Chronic pain of both knees     Primary osteoarthritis of left knee Yes          Plan:       Chanelle was seen today for pain.    Diagnoses and all  orders for this visit:    Primary osteoarthritis of left knee    Chronic pain of both knees  -     Ambulatory referral/consult to Orthopedics      Treatment options have been discussed.  We have decided to proceed with left knee Euflexxa injections.  The risk of pseudoseptic reactions were discussed, as was the minimal risk of infection.  Chanelle Downey will return for her first injection.

## 2023-05-10 ENCOUNTER — PATIENT MESSAGE (OUTPATIENT)
Dept: GASTROENTEROLOGY | Facility: CLINIC | Age: 63
End: 2023-05-10
Payer: COMMERCIAL

## 2023-05-10 ENCOUNTER — TELEPHONE (OUTPATIENT)
Dept: CARDIOLOGY | Facility: CLINIC | Age: 63
End: 2023-05-10
Payer: COMMERCIAL

## 2023-05-10 NOTE — TELEPHONE ENCOUNTER
----- Message from Robles Hubbard sent at 5/10/2023  3:17 PM CDT -----  Contact: [phone]  Good afternoon patient  would like a referral for a lymphedema doc. Please give patient a call back [phone]

## 2023-05-16 ENCOUNTER — HOSPITAL ENCOUNTER (OUTPATIENT)
Dept: RADIOLOGY | Facility: HOSPITAL | Age: 63
Discharge: HOME | End: 2023-05-16
Attending: NURSE PRACTITIONER
Payer: COMMERCIAL

## 2023-05-16 DIAGNOSIS — I83.93 VARICOSE VEINS OF BOTH LOWER EXTREMITIES, UNSPECIFIED WHETHER COMPLICATED: ICD-10-CM

## 2023-05-16 DIAGNOSIS — I10 ESSENTIAL HYPERTENSION: ICD-10-CM

## 2023-05-16 DIAGNOSIS — R60.0 EDEMA OF BOTH LOWER LEGS: ICD-10-CM

## 2023-05-16 DIAGNOSIS — F32.0 CURRENT MILD EPISODE OF MAJOR DEPRESSIVE DISORDER, UNSPECIFIED WHETHER RECURRENT: ICD-10-CM

## 2023-05-16 DIAGNOSIS — I73.9 PVD (PERIPHERAL VASCULAR DISEASE): ICD-10-CM

## 2023-05-16 DIAGNOSIS — F11.20 METHADONE DEPENDENCE: ICD-10-CM

## 2023-05-16 DIAGNOSIS — G89.4 CHRONIC PAIN SYNDROME: ICD-10-CM

## 2023-05-18 ENCOUNTER — DOCUMENTATION ONLY (OUTPATIENT)
Dept: CARDIOLOGY | Facility: CLINIC | Age: 63
End: 2023-05-18
Payer: COMMERCIAL

## 2023-05-18 NOTE — PROGRESS NOTES
I89.0 lymphedema not elsewhere classified secondary to CVI. Patient has been compliant with compression of 20-30mmhg, elevation and exercise for at least 4 weeks yet swelling persists. Hyperplasia is present with this edema.

## 2023-05-29 ENCOUNTER — OFFICE VISIT (OUTPATIENT)
Dept: ORTHOPEDICS | Facility: CLINIC | Age: 63
End: 2023-05-29
Payer: COMMERCIAL

## 2023-05-29 VITALS — HEIGHT: 61 IN | BODY MASS INDEX: 37.69 KG/M2 | WEIGHT: 199.63 LBS

## 2023-05-29 DIAGNOSIS — M17.12 PRIMARY OSTEOARTHRITIS OF LEFT KNEE: Primary | ICD-10-CM

## 2023-05-29 PROCEDURE — 20610 DRAIN/INJ JOINT/BURSA W/O US: CPT | Mod: LT,S$GLB,, | Performed by: PHYSICIAN ASSISTANT

## 2023-05-29 PROCEDURE — 20610 PR DRAIN/INJECT LARGE JOINT/BURSA: ICD-10-PCS | Mod: LT,S$GLB,, | Performed by: PHYSICIAN ASSISTANT

## 2023-05-29 PROCEDURE — 99499 UNLISTED E&M SERVICE: CPT | Mod: S$GLB,,, | Performed by: PHYSICIAN ASSISTANT

## 2023-05-29 PROCEDURE — 99999 PR PBB SHADOW E&M-EST. PATIENT-LVL III: ICD-10-PCS | Mod: PBBFAC,,, | Performed by: PHYSICIAN ASSISTANT

## 2023-05-29 PROCEDURE — 99499 NO LOS: ICD-10-PCS | Mod: S$GLB,,, | Performed by: PHYSICIAN ASSISTANT

## 2023-05-29 PROCEDURE — 99999 PR PBB SHADOW E&M-EST. PATIENT-LVL III: CPT | Mod: PBBFAC,,, | Performed by: PHYSICIAN ASSISTANT

## 2023-05-29 RX ORDER — CEPHALEXIN 500 MG/1
500 CAPSULE ORAL EVERY 6 HOURS
Qty: 28 CAPSULE | Refills: 0 | Status: SHIPPED | OUTPATIENT
Start: 2023-05-29 | End: 2023-06-05

## 2023-05-29 NOTE — PROGRESS NOTES
Chanelle Downey presents to clinic today for the first left knee Euflexxa injection.  There has been no significant change in her medical status since her last visit. No fever, chills, malaise, or unexplained weight change.    Allergies, medications, past medical and surgical history were reviewed.    Exam demonstrates there is no effusion in the  left knee, and the skin is intact.    Diagnosis: left knee osteoarthritis    After time out was performed and patient ID, side, and site were verified, the  left  knee was sterilly prepped in the standard fashion.  A 22-gauge needle was introduced into left knee joint from an monica-lateral site without complication and knee was then injected with 2 ml of Euflexxa.  Sterile dressing was applied.  The patient was instructed to resume activities as tolerated and to call with any problems.     We will see Chanelle Downey back next week for the second injection.

## 2023-06-07 ENCOUNTER — PATIENT MESSAGE (OUTPATIENT)
Dept: CARDIOLOGY | Facility: HOSPITAL | Age: 63
End: 2023-06-07
Payer: COMMERCIAL

## 2023-06-07 ENCOUNTER — OFFICE VISIT (OUTPATIENT)
Dept: ORTHOPEDICS | Facility: CLINIC | Age: 63
End: 2023-06-07
Payer: COMMERCIAL

## 2023-06-07 VITALS — BODY MASS INDEX: 38.23 KG/M2 | WEIGHT: 202.5 LBS | HEIGHT: 61 IN

## 2023-06-07 DIAGNOSIS — M17.12 PRIMARY OSTEOARTHRITIS OF LEFT KNEE: Primary | ICD-10-CM

## 2023-06-07 PROCEDURE — 99999 PR PBB SHADOW E&M-EST. PATIENT-LVL III: ICD-10-PCS | Mod: PBBFAC,,, | Performed by: PHYSICIAN ASSISTANT

## 2023-06-07 PROCEDURE — 20610 PR DRAIN/INJECT LARGE JOINT/BURSA: ICD-10-PCS | Mod: LT,S$GLB,, | Performed by: PHYSICIAN ASSISTANT

## 2023-06-07 PROCEDURE — 99499 UNLISTED E&M SERVICE: CPT | Mod: S$GLB,,, | Performed by: PHYSICIAN ASSISTANT

## 2023-06-07 PROCEDURE — 20610 DRAIN/INJ JOINT/BURSA W/O US: CPT | Mod: LT,S$GLB,, | Performed by: PHYSICIAN ASSISTANT

## 2023-06-07 PROCEDURE — 99999 PR PBB SHADOW E&M-EST. PATIENT-LVL III: CPT | Mod: PBBFAC,,, | Performed by: PHYSICIAN ASSISTANT

## 2023-06-07 PROCEDURE — 99499 NO LOS: ICD-10-PCS | Mod: S$GLB,,, | Performed by: PHYSICIAN ASSISTANT

## 2023-06-07 NOTE — PROGRESS NOTES
Chanelle Downey presents to clinic today for the second left knee Euflexxa injection.  There has been no significant change in her medical status since her last visit. No fever, chills, malaise, or unexplained weight change.    Allergies, medications, past medical and surgical history were reviewed.    Exam demonstrates there is no effusion in the  left knee, and the skin is intact.    Diagnosis: left knee osteoarthritis    After time out was performed and patient ID, side, and site were verified, the  left  knee was sterilly prepped in the standard fashion.  A 22-gauge needle was introduced into left knee joint from an monica-lateral site without complication and knee was then injected with 2 ml of Euflexxa.  Sterile dressing was applied.  The patient was instructed to resume activities as tolerated and to call with any problems.     We will see Chanelle Downey back next week for the third injection.

## 2023-06-14 DIAGNOSIS — I10 ESSENTIAL HYPERTENSION: ICD-10-CM

## 2023-06-14 DIAGNOSIS — R60.0 BILATERAL LOWER EXTREMITY EDEMA: ICD-10-CM

## 2023-06-14 RX ORDER — HYDROCHLOROTHIAZIDE 12.5 MG/1
12.5 TABLET ORAL DAILY
Qty: 90 TABLET | Refills: 3 | Status: SHIPPED | OUTPATIENT
Start: 2023-06-14

## 2023-06-14 NOTE — TELEPHONE ENCOUNTER
----- Message from Richard Carter sent at 6/13/2023  4:22 PM CDT -----  Who Called: Patient         Refill or New Rx.  hydroCHLOROthiazide (HYDRODIURIL) 12.5 MG Tab 30 tablet 5 12/21/2022  No  Sig - Route: Take 1 tablet (12.5 mg total) by mouth once daily. - Oral  Patient taking differently: Take 12.5 mg by mouth once daily. Pt states she is taking it twice daily       Sent to pharmacy as: hydroCHLOROthiazide (HYDRODIURIL) 12.5 MG Tab  Class: Normal           Preferred Pharmacy with phone number:   [id number] [place] [address] [address] [place] [address] [address] [address]  Phone: [phone] Fax: [phone]      Local or Mail Order: Local         Ordering Provider: Asha Evans Call Back Number: [phone]           Additional Information: pt original provider is no longer with [facility] to be able to fill the medication above, pt states pharmacy is needing a refill request to fill pt medication.

## 2023-06-14 NOTE — TELEPHONE ENCOUNTER
No care due was identified.  [facility] Embedded Care Due Messages. Reference number: [id number].   6/14/2023 7:50:46 AM CDT

## 2023-06-15 ENCOUNTER — OFFICE VISIT (OUTPATIENT)
Dept: ORTHOPEDICS | Facility: CLINIC | Age: 63
End: 2023-06-15
Payer: COMMERCIAL

## 2023-06-15 VITALS — BODY MASS INDEX: 38.21 KG/M2 | WEIGHT: 202.38 LBS | HEIGHT: 61 IN

## 2023-06-15 DIAGNOSIS — M17.12 PRIMARY OSTEOARTHRITIS OF LEFT KNEE: Primary | ICD-10-CM

## 2023-06-15 PROCEDURE — 99499 NO LOS: ICD-10-PCS | Mod: S$GLB,,, | Performed by: PHYSICIAN ASSISTANT

## 2023-06-15 PROCEDURE — 99499 UNLISTED E&M SERVICE: CPT | Mod: S$GLB,,, | Performed by: PHYSICIAN ASSISTANT

## 2023-06-15 PROCEDURE — 99999 PR PBB SHADOW E&M-EST. PATIENT-LVL III: CPT | Mod: PBBFAC,,, | Performed by: PHYSICIAN ASSISTANT

## 2023-06-15 PROCEDURE — 20610 PR DRAIN/INJECT LARGE JOINT/BURSA: ICD-10-PCS | Mod: LT,S$GLB,, | Performed by: PHYSICIAN ASSISTANT

## 2023-06-15 PROCEDURE — 20610 DRAIN/INJ JOINT/BURSA W/O US: CPT | Mod: LT,S$GLB,, | Performed by: PHYSICIAN ASSISTANT

## 2023-06-15 PROCEDURE — 99999 PR PBB SHADOW E&M-EST. PATIENT-LVL III: ICD-10-PCS | Mod: PBBFAC,,, | Performed by: PHYSICIAN ASSISTANT

## 2023-06-15 NOTE — PROGRESS NOTES
Chanelle Downey presents to clinic today for the third left knee Euflexxa injection.  There has been no significant change in her medical status since her last visit. No fever, chills, malaise, or unexplained weight change.    Allergies, medications, past medical and surgical history were reviewed.    Exam demonstrates there is no effusion in the  left knee, and the skin is intact.    Diagnosis: left knee osteoarthritis    After time out was performed, verbal consent obtained and patient ID, side, and site were verified, the  left  knee was sterilly prepped in the standard fashion.  A 22-gauge needle was introduced into left knee joint from an monica-lateral site without complication and knee was then injected with 2 ml of Euflexxa.  Sterile dressing was applied.  The patient was instructed to resume activities as tolerated and to call with any problems.     We will see Chanelle Downey back for follow up as needed

## 2023-06-22 NOTE — TELEPHONE ENCOUNTER
Lymphedema Pump Progress:  [x] Order, clinical notes, and face sheet faxed to [facility] for home pneumatic compression.  [x] Reached out to patient for follow up. Wish to inquire about symptoms of lymphedema and if conservative therapy has been working.  [x] Updated progress note sent to [facility].        Demonstration needed to complete the order.Pedro has been unable to get in touch with Ms. Roca. She has been called and have not received a response.

## 2023-07-18 ENCOUNTER — CLINICAL SUPPORT (OUTPATIENT)
Dept: REHABILITATION | Facility: HOSPITAL | Age: 63
End: 2023-07-18
Payer: COMMERCIAL

## 2023-07-18 DIAGNOSIS — I89.0 LYMPHEDEMA OF BOTH LOWER EXTREMITIES: Primary | ICD-10-CM

## 2023-07-18 DIAGNOSIS — R60.0 EDEMA OF BOTH LOWER LEGS: ICD-10-CM

## 2023-07-18 PROCEDURE — 97167 OT EVAL HIGH COMPLEX 60 MIN: CPT

## 2023-07-18 PROCEDURE — 97530 THERAPEUTIC ACTIVITIES: CPT

## 2023-07-19 NOTE — PLAN OF CARE
[facility]  Occupational Therapy Initial Evaluation  Lymphedema      Name: Chanelle Downey  Clinic Number: [id number]    Therapy Diagnosis:   Encounter Diagnoses   Name Primary?    Edema of both lower legs     Lymphedema of both lower extremities Yes     Physician: Luis Eduardo Eckert MD*    Physician Orders: Eval and Treat  Medical Diagnosis: R60.0 (ICD-10-CM) - Edema of both lower legs  Evaluation Date: 7/18/2023  Insurance Authorization Period Expiration: 4/19/2024  Plan of Care Certification Period: 10/11/2023  Visit # / Visits authorized: 1 / 1  FOTO: see media, 1 / 3    Precautions:  Standard and Fall    Time In: 2:10  Time Out: 2:55  Total Appointment Time (timed & untimed codes): 45 minutes    Subjective      Date of onset: 2 years ago  History of current condition - Chanelle reports: edema presented two years ago but has progressed in the last 6 months. Purchased several different pairs of  knee compression stockings at [facility] that were all uncomfortable. She is not currently wearing compression.       Previous Lymphedema Therapy: no  Wears Compression: no       Imaging:      CV US Lower Extremity Veins Bilateral Insufficiency   Conclusion  There is no evidence of a right lower extremity DVT.  There is a right subcutaneous edema.  There is no evidence of a left lower extremity DVT.  There is a left subcutaneous edema.  The left greater saphenous vein has reflux.  There is no right superficial vein reflux present.    CV Ultrasound doppler arterial legs bilat   Conclusion  KYA's are unreliable due to non-compressible vessels.  There are no elevated velocities consistent with significant LE PAD.      Pain:  Functional Pain Scale Rating 0-10: 4/10  8/10 on average  4/10 at best  8/10 at worst  Location: BLE  Description: Aching, Tingling, Sharp, and Shooting  Aggravating Factors: Sitting and Walking  Easing Factors: pain medication    Occupation:  LPN  Working presently:  "employed  Duties: long term care, pediatrics with disabilities     Functional Limitations/Social History:    Previous functional status includes: Independent with all ADLs.   Recent falls: yes, three days ago, denies head strike.     Current Functional Status   Home/Living environment: lives alone    - 1 story home, 0 steps to enter    - DME: cane      Limitation of Functional Status as follows:   ADLs/IADLs:     - Feeding: none    - Bathing: none    - Dressing/Grooming: none    - Home Management: none    - Driving: none      Patient's Goals for Therapy: "I would like to get the swelling down."     Past Medical History/Physical Systems Review:   Chanelle Downey  has a past medical history of Back pain, chronic, Biliary colic, Calculus of gallbladder without cholecystitis without obstruction, Cervical radiculopathy, Cervical stenosis of spinal canal, Degenerative cervical spinal stenosis, Depression, Flat back syndrome, postprocedural, and HSV (herpes simplex virus) anogenital infection.    Chanelle Downey  has a past surgical history that includes Eye surgery (); Tonsillectomy;  section; Appendectomy; Hysterectomy; Lumbar fusion (); Lumbar discectomy (); and Laparoscopic cholecystectomy (N/A, 2018).    Chanelle has a current medication list which includes the following prescription(s): aspirin, atorvastatin, bumetanide, duloxetine, ergocalciferol (vitamin d2), hydrochlorothiazide, meloxicam, methadone hcl, omeprazole, ubidecar/fish oil/omega-3/tyree, valacyclovir, vitamin d, and zinc, and the following Facility-Administered Medications: sodium hyaluronate (euflexxa).    Review of patient's allergies indicates:  No Known Allergies     Pt denies: CHF, CKD, DVT, CA, severe PAD  Pt admits medically managed/treated: cellulitis 10 years ago    Objective      Patient received from waiting room.   Mental status: alert, oriented to person, place, and time  Appearance: Well groomed  Behavior:  calm " and cooperative  Attention Span and Concentration:  Normal    Affected Areas: RLE and LLE  Refill: Day   Stemmer Sign: positive  Shape: unremarkable  Tissue Texture: soft, pliable, pitting  Skin Integrity: varicose veins  Sensation: intact  Circulation: intact      LE Girth Measurements: taken in supine  LANDMARK RIGHT LE  7/18 LEFT LE  7/18   SBP 53.0 cm 52.5 cm   10 below SBP 47.0 cm 47.5 cm   20 below SBP 44.5 cm 42.0 cm   30 below SBP 34.0 cm 32.0 cm   35 below SBP 28.0 cm 26.5 cm   Ankle 25.0 cm 24.0 cm   Forefoot 21.0 cm 21.0 cm       Picture of BLE in sitting; taken via [employer] Haiku on therapist's cell phone with verbal consent from patient:          Limitation/Restriction for FOTO Lower Quadrant Edema Survey    Therapist reviewed FOTO scores for Chanelle Downey on 7/18/2023.   FOTO documents entered into [facility] - see Media section.    Limitation Score: 52%         Treatment      Total Treatment time (time-based codes) separate from Evaluation: 10 minutes    Chanelle received the treatments listed below:        therapeutic activities to improve functional performance for 10  minutes, including:    Pt was educated in potential compression needs.  Demo of products including socks, garments, and Inelastic Velcro wraps.   Discussed cost/coverage and authorization per insurance with Durable Medical Equipment(DME) provider.  Compression require orders from referring provider and coverage or purchase of products from DME or self order.      Discussed wear schedule, don/doff, wash and management of products.  Size and compression class and AM/PM needs.    Consideration for Edema Wear as form of temporary compression use until securing compression needs.   Consideration for shorts/tights or capris style compression to compliment lower leg compression to support size/shape of LE.   Product information provided.   Vendor list provided.    Informed insurance coverage of compression is per DME provider and typically Medicare  and Medicare group plans may not cover cost beyond pair of standard sized knee high garments.   Commitment to attendance as well as commitment to securing compression needs is critical to edema management.     Patient Education and Home Exercises      Education provided:   1. Educated on definition of lymphedema.  2. Explained the Complete Decongestive Therapy protocol in depth  3. Educated on Phase 1 and 2 of protocol.  4. Reviewed treatment frequency and likely duration of weeks  5.Contraindications for treatment.  6. Plan of care and goals.  7. Educated on home management protocols.   8. Role of OT, goals for OT, scheduling/cancellations, insurance limitations.  9. Provided lymphedema educational pamphlet        Assessment      Chanelle is a 63 y.o. female referred to outpatient Occupational Therapy with a medical diagnosis of lymphedema. This patient presents with stage 2 lymphedema due to CVI.  Patient's deficits include swelling of the BLE, increased pain, increased stiffness in the BLE, as well as difficulty performing ADLs , and placing the pt at higher risk of infection. Therapist's recommendation includes elevation, exercise, manual lymphatic drainage, pneumatic compression pump, multi-layer bandaging, and compression garments for 8 weeks to reduce swelling. Chanelle would benefit from complete decongestive therapy to reduce size of BLE, reduce risk of wounds and poor skin integrity as well as education to self-maintain reduction with use of compression garments.    Anticipated barriers to occupational therapy: none    Plan of care discussed with patient: Yes  Patient's spiritual, cultural and educational needs considered and patient is agreeable to the plan of care and goals as stated below:     Medical Necessity is demonstrated by the following  Occupational Profile/History  Co-morbidities and personal factors that may impact the plan of care [] LOW: Brief chart review  [x] MODERATE: Expanded chart review   []  HIGH: Extensive chart review    Moderate / High Support Documentation:  Back pain, chronic, Biliary colic, Calculus of gallbladder without cholecystitis without obstruction, Cervical radiculopathy, Cervical stenosis of spinal canal, Degenerative cervical spinal stenosis, Depression, Flat back syndrome     Examination  Performance deficits relating to physical, cognitive or psychosocial skills that result in activity limitations and/or participation restrictions  [] LOW: addressing 1-3 Performance deficits  [] MODERATE: 3-5 Performance deficits  [x] HIGH: 5+ Performance deficits (please support below)    Moderate / High Support Documentation:    Physical:  Joint Mobility  Joint Stability  Muscle Power/Strength  Muscle Endurance  Edema  Gross Motor Coordination  Postural Control  Pain    Cognitive:  No Deficits    Psychosocial:    No Deficits     Treatment Options [] LOW: Limited options  [] MODERATE: Several options  [x] HIGH: Multiple options      Decision Making/ Complexity Score: high       The following goals were discussed with the patient and patient is in agreement with them as to be addressed in the treatment plan.     Goals:     Short Term Goals: (4 weeks)  Goals: Progressing / MET   1. Patient will demonstrate 100% knowledge of lymphedema precautions and signs of infection to allow for reduced lymphedema risk, infection risk, and/or exacerbation of condition.  NOT MET   2. Patient will tolerate daily activities wearing multilayered bandaging to allow for lymphatic drainage support, skin elasticity, and reduction in shape and size of limb.  NOT MET   3. Patient and/or caregiver will order/obtain appropriate compression garments to maintain lymphatic and venous support.  NOT MET   4. Patient will show decreased total girth measurement in BLE by up to 2 cm to allow for lower extremity symmetry, shoe and clothing choice, and ability to apply needed compression. NOT MET     Long Term Goals: (8 weeks)  Goals:  Progressing / MET   1. Patient and/or caregiver to tete/doff compression garment independently and with daily compliance to assist in lymphedema management, skin elasticity, and tissue density NOT MET   2. Patient and/or caregiver will be independent in use of pneumatic compression pump or self manual lymphatic drainage techniques to areas within reach to enhance lymphatic drainage and skin condition.  NOT MET   3. Patient to be independent and compliant with home exercise program to allow for increased function in affected limb. NOT MET   4. Patient will show decreased total girth measurement in BLE by up to 4 cm  to allow for lower extremity symmetry, shoe and clothing choice, and ability to apply needed compression daily. NOT MET        Plan     Plan of Care Certification: 7/18/2023 to 10/11/2023.     Therapy Track: COMPLETE DECONGESTIVE THERAPY  Interventions: manual lymphatic drainage, multi-layer bandaging, compression garments, therapeutic exercise, self bandaging and manual lymphatic drainage training, home exercise programming.      Outpatient Occupational Therapy 2 times weekly for 8 weeks to include the following interventions: Manual therapy/joint mobilizations, Therapeutic exercises/activities., and Edema Control.    Aissatou Roach, OT, CLWT      I CERTIFY THE NEED FOR THESE SERVICES FURNISHED UNDER THIS PLAN OF TREATMENT AND WHILE UNDER MY CARE   Physician's comments:     Physician's Signature: ___________________________________________________

## 2023-07-25 ENCOUNTER — CLINICAL SUPPORT (OUTPATIENT)
Dept: REHABILITATION | Facility: HOSPITAL | Age: 63
End: 2023-07-25
Payer: COMMERCIAL

## 2023-07-25 DIAGNOSIS — I89.0 LYMPHEDEMA OF BOTH LOWER EXTREMITIES: Primary | ICD-10-CM

## 2023-07-25 PROCEDURE — 97140 MANUAL THERAPY 1/> REGIONS: CPT

## 2023-07-25 PROCEDURE — 97016 VASOPNEUMATIC DEVICE THERAPY: CPT

## 2023-07-25 PROCEDURE — 97110 THERAPEUTIC EXERCISES: CPT

## 2023-07-25 NOTE — PROGRESS NOTES
[facility]  Occupational Therapy Treatment Note  Lymphedema    Date: 7/25/2023  Name: Chanelle Downey  Clinic Number: [id number]    Therapy Diagnosis:   Encounter Diagnosis   Name Primary?    Lymphedema of both lower extremities Yes     Physician: Luis Eduardo Eckert MD*    Physician Orders: Eval and Treat  Medical Diagnosis: R60.0 (ICD-10-CM) - Edema of both lower legs  Evaluation Date: 7/18/2023  Insurance Authorization Period Expiration: 4/19/2024  Plan of Care Certification Period: 10/11/2023  Visit # / Visits authorized: 1 / 20  FOTO: see media, 1 / 3     Precautions:  Standard and Fall    Time In: 2:03  Time Out: 3:00  Total Billable Time: 57 minutes    SUBJECTIVE     Pt reports: weeping from LLE, legs are very swollen.   Chanelle reported wearing compression outside of therapy visits: No  Response to previous treatment:N/A  Functional change: none    Pain: did not quantify       OBJECTIVE     Pt arrived out of compression, ambulating with cane.     Compression garment status: nothing purchased/ordered  Compression Rx status: not requested  Pneumatic pump status: established with [employer]     Objective Measures updated at progress report unless specified.    Treatment     Chanelle received the treatments listed below:     Manual therapy techniques were applied for 45 minutes, including:    manual therapy techniques: Complete Decongestive Therapy was applied to the: BLE for 45 minutes, including: manual lymphatic drainage and short stretch compression bandaging     MANUAL LYMPHATIC DRAINAGE: BLE   Supine with lower extremities elevated:  Deep abdominal techniques  Leg treatment, knee, lower leg, ankle, dorsum of foot  Rework: leg, inguinal lymph nodes, abdominal techniques    SEQUENTIAL COMPRESSION PUMP:   Full leg sleeve applied to LLE  Vascular Enhancement System (VES) with distal pressures starting at 45mmHg entire LE   -Simultaneous with Manual Lymphatic Drainage of RLE and  education    MULTILAYERED BANDAGING:    Issued supplies and bandaged BLE via modified technique to accommodate footwear    Use of Cavilon moisturizer for dry skin  Cotton stockinet: size 9  Cellona padding padding from dorsum of foot to knee,   2-8cm and 2-10cm Rosidal K rolls foot to distal knee      Pt to leave intact 24 hrs as tolerated, discontinue with any problems, return rolled bandages next session. Wash and wear schedules confirmed.     Provided edema wear size medium as temporary compression.    Product information provided: none this session    Therapeutic exercise was completed for 12 minutes, including:      Ankle pumps 3 x 10  Calf raises 3 x 10  Knee extension with 1' hold, 3 x 10  Ball squeezes 3 x 10    Educated on benefit of completing exercise in compression to maximize lymphatic return.      Patient Education and Home Exercises      Education provided:   - falls prevention   - Progress towards goals     Written Home Exercises Provided: yes.  Exercises were reviewed and Chanelle was able to demonstrate them prior to the end of the session.  Chanelle demonstrated good  understanding of the HEP provided. See EMR under Patient Instructions for exercises provided during therapy sessions.       Assessment     Bandages applied via modified technique to accommodate footwear. Pt to use edema wear as compression until able to purchase 20-30 mm hg stockings.     Pt would continue to benefit from skilled OT.      Chanelle is progressing well towards her goals and there are no updates to goals at this time. Pt prognosis is Good.     Pt will continue to benefit from skilled outpatient occupational therapy to address the deficits listed in the problem list on initial evaluation provide pt/family education and to maximize pt's level of independence in the home and community environment.     Pt's spiritual, cultural and educational needs considered and pt agreeable to plan of care and goals.    Anticipated barriers to  occupational therapy: none    Goals:    Short Term Goals: (4 weeks)  Goals: Progressing / MET   1. Patient will demonstrate 100% knowledge of lymphedema precautions and signs of infection to allow for reduced lymphedema risk, infection risk, and/or exacerbation of condition.  NOT MET   2. Patient will tolerate daily activities wearing multilayered bandaging to allow for lymphatic drainage support, skin elasticity, and reduction in shape and size of limb.  NOT MET   3. Patient and/or caregiver will order/obtain appropriate compression garments to maintain lymphatic and venous support.  NOT MET   4. Patient will show decreased total girth measurement in BLE by up to 2 cm to allow for lower extremity symmetry, shoe and clothing choice, and ability to apply needed compression. NOT MET      Long Term Goals: (8 weeks)  Goals: Progressing / MET   1. Patient and/or caregiver to tete/doff compression garment independently and with daily compliance to assist in lymphedema management, skin elasticity, and tissue density NOT MET   2. Patient and/or caregiver will be independent in use of pneumatic compression pump or self manual lymphatic drainage techniques to areas within reach to enhance lymphatic drainage and skin condition.  NOT MET   3. Patient to be independent and compliant with home exercise program to allow for increased function in affected limb. NOT MET   4. Patient will show decreased total girth measurement in BLE by up to 4 cm  to allow for lower extremity symmetry, shoe and clothing choice, and ability to apply needed compression daily. NOT MET          PLAN     Updates/Grading for next session: none    Aissatou Roach, OT, CLWT

## 2023-07-26 ENCOUNTER — CLINICAL SUPPORT (OUTPATIENT)
Dept: REHABILITATION | Facility: HOSPITAL | Age: 63
End: 2023-07-26
Payer: COMMERCIAL

## 2023-07-26 DIAGNOSIS — I89.0 LYMPHEDEMA OF BOTH LOWER EXTREMITIES: Primary | ICD-10-CM

## 2023-07-26 PROCEDURE — 97110 THERAPEUTIC EXERCISES: CPT

## 2023-07-26 PROCEDURE — 97140 MANUAL THERAPY 1/> REGIONS: CPT

## 2023-07-26 NOTE — PROGRESS NOTES
[facility]  Occupational Therapy Treatment Note  Lymphedema    Date: 7/26/2023  Name: Chanelle Downey  Clinic Number: [id number]    Therapy Diagnosis:   Encounter Diagnosis   Name Primary?    Lymphedema of both lower extremities Yes     Physician: Luis Eduardo Ecketr MD*    Physician Orders: Eval and Treat  Medical Diagnosis: R60.0 (ICD-10-CM) - Edema of both lower legs  Evaluation Date: 7/18/2023  Insurance Authorization Period Expiration: 4/19/2024  Plan of Care Certification Period: 10/11/2023  Visit # / Visits authorized: 2 / 20  FOTO: see media, 1 / 3     Precautions:  Standard and Fall    Time In: 2:14  Time Out: 3:01  Total Billable Time: 47 minutes     SUBJECTIVE     Pt reports: swelling improved. Tolerated bandages well. Edema wear comfortable.   Chanelle reported wearing compression outside of therapy visits: yes, edema wear size medium  Response to previous treatment: appeared reduced   Functional change: none    Pain: 0/10      OBJECTIVE     Pt arrived wearing edema wear on BLE, ambulating with cane.     Compression garment status: nothing purchased/ordered  Compression Rx status: not requested  Pneumatic pump status: established with [employer]     Objective Measures updated at progress report unless specified.    Treatment     Chanelle received the treatments listed below:     Manual therapy techniques were applied for 37 minutes, including:    manual therapy techniques: Complete Decongestive Therapy was applied to the: BLE for 45 minutes, including: manual lymphatic drainage and short stretch compression bandaging     MANUAL LYMPHATIC DRAINAGE: BLE   Supine with lower extremities elevated:  Deep abdominal techniques  Leg treatment, knee, lower leg, ankle, dorsum of foot  Rework: leg, inguinal lymph nodes, abdominal techniques    MULTILAYERED BANDAGING:    Issued supplies and bandaged BLE via modified technique to accommodate footwear    Use of Cavilon moisturizer for dry  skin  Cotton stockinet: size 9  Cellona padding padding from dorsum of foot to knee,   2-8cm and 2-10cm Rosidal K rolls foot to distal knee      Pt to leave intact 48 hrs as tolerated, discontinue with any problems, return rolled bandages next session. Wash and wear schedules confirmed.     Provided edema wear size medium as temporary compression.    Product information provided: none this session    Therapeutic exercise was completed for 10 minutes, including:      Ankle pumps 3 x 10  Calf raises 3 x 10  Knee extension with 1' hold, 3 x 10  Ball squeezes 3 x 10    Educated on benefit of completing exercise in compression to maximize lymphatic return.      Patient Education and Home Exercises      Education provided:   - falls prevention   - Progress towards goals     Written Home Exercises Provided: yes.  Exercises were reviewed and Chanelle was able to demonstrate them prior to the end of the session.  Chanelle demonstrated good  understanding of the HEP provided. See EMR under Patient Instructions for exercises provided during therapy sessions.       Assessment     Tolerated bandages well. Edema reducing. Continue POC as above.     Pt would continue to benefit from skilled OT.      Chanelle is progressing well towards her goals and there are no updates to goals at this time. Pt prognosis is Good.     Pt will continue to benefit from skilled outpatient occupational therapy to address the deficits listed in the problem list on initial evaluation provide pt/family education and to maximize pt's level of independence in the home and community environment.     Pt's spiritual, cultural and educational needs considered and pt agreeable to plan of care and goals.    Anticipated barriers to occupational therapy: none    Goals:    Short Term Goals: (4 weeks)  Goals: Progressing / MET   1. Patient will demonstrate 100% knowledge of lymphedema precautions and signs of infection to allow for reduced lymphedema risk, infection risk, and/or  exacerbation of condition.  NOT MET   2. Patient will tolerate daily activities wearing multilayered bandaging to allow for lymphatic drainage support, skin elasticity, and reduction in shape and size of limb.  NOT MET   3. Patient and/or caregiver will order/obtain appropriate compression garments to maintain lymphatic and venous support.  NOT MET   4. Patient will show decreased total girth measurement in BLE by up to 2 cm to allow for lower extremity symmetry, shoe and clothing choice, and ability to apply needed compression. NOT MET      Long Term Goals: (8 weeks)  Goals: Progressing / MET   1. Patient and/or caregiver to tete/doff compression garment independently and with daily compliance to assist in lymphedema management, skin elasticity, and tissue density NOT MET   2. Patient and/or caregiver will be independent in use of pneumatic compression pump or self manual lymphatic drainage techniques to areas within reach to enhance lymphatic drainage and skin condition.  NOT MET   3. Patient to be independent and compliant with home exercise program to allow for increased function in affected limb. NOT MET   4. Patient will show decreased total girth measurement in BLE by up to 4 cm  to allow for lower extremity symmetry, shoe and clothing choice, and ability to apply needed compression daily. NOT MET          PLAN     Updates/Grading for next session: none    Aissatou Roach, OT, CLWT

## 2023-07-27 ENCOUNTER — OFFICE VISIT (OUTPATIENT)
Dept: CARDIOLOGY | Facility: CLINIC | Age: 63
End: 2023-07-27
Payer: COMMERCIAL

## 2023-07-27 VITALS
SYSTOLIC BLOOD PRESSURE: 140 MMHG | HEIGHT: 60 IN | BODY MASS INDEX: 40.86 KG/M2 | HEART RATE: 94 BPM | WEIGHT: 208.13 LBS | DIASTOLIC BLOOD PRESSURE: 62 MMHG

## 2023-07-27 DIAGNOSIS — E78.2 MIXED HYPERLIPIDEMIA: ICD-10-CM

## 2023-07-27 DIAGNOSIS — I89.0 LYMPHEDEMA OF BOTH LOWER EXTREMITIES: ICD-10-CM

## 2023-07-27 DIAGNOSIS — I10 ESSENTIAL HYPERTENSION: Primary | ICD-10-CM

## 2023-07-27 DIAGNOSIS — R00.2 PALPITATIONS: ICD-10-CM

## 2023-07-27 DIAGNOSIS — R60.0 EDEMA OF BOTH LOWER EXTREMITIES: ICD-10-CM

## 2023-07-27 DIAGNOSIS — Z72.0 TOBACCO ABUSE: ICD-10-CM

## 2023-07-27 DIAGNOSIS — I83.93 VARICOSE VEINS OF BOTH LOWER EXTREMITIES, UNSPECIFIED WHETHER COMPLICATED: ICD-10-CM

## 2023-07-27 DIAGNOSIS — F17.210 CIGARETTE NICOTINE DEPENDENCE WITHOUT COMPLICATION: ICD-10-CM

## 2023-07-27 DIAGNOSIS — F17.200 SMOKING: ICD-10-CM

## 2023-07-27 PROCEDURE — 3044F PR MOST RECENT HEMOGLOBIN A1C LEVEL <7.0%: ICD-10-PCS | Mod: CPTII,S$GLB,, | Performed by: INTERNAL MEDICINE

## 2023-07-27 PROCEDURE — 1159F PR MEDICATION LIST DOCUMENTED IN MEDICAL RECORD: ICD-10-PCS | Mod: CPTII,S$GLB,, | Performed by: INTERNAL MEDICINE

## 2023-07-27 PROCEDURE — 3008F BODY MASS INDEX DOCD: CPT | Mod: CPTII,S$GLB,, | Performed by: INTERNAL MEDICINE

## 2023-07-27 PROCEDURE — 99999 PR PBB SHADOW E&M-EST. PATIENT-LVL IV: ICD-10-PCS | Mod: PBBFAC,,, | Performed by: INTERNAL MEDICINE

## 2023-07-27 PROCEDURE — 3078F DIAST BP <80 MM HG: CPT | Mod: CPTII,S$GLB,, | Performed by: INTERNAL MEDICINE

## 2023-07-27 PROCEDURE — 3044F HG A1C LEVEL LT 7.0%: CPT | Mod: CPTII,S$GLB,, | Performed by: INTERNAL MEDICINE

## 2023-07-27 PROCEDURE — 3078F PR MOST RECENT DIASTOLIC BLOOD PRESSURE < 80 MM HG: ICD-10-PCS | Mod: CPTII,S$GLB,, | Performed by: INTERNAL MEDICINE

## 2023-07-27 PROCEDURE — 99215 PR OFFICE/OUTPT VISIT, EST, LEVL V, 40-54 MIN: ICD-10-PCS | Mod: S$GLB,,, | Performed by: INTERNAL MEDICINE

## 2023-07-27 PROCEDURE — 1159F MED LIST DOCD IN RCRD: CPT | Mod: CPTII,S$GLB,, | Performed by: INTERNAL MEDICINE

## 2023-07-27 PROCEDURE — 99999 PR PBB SHADOW E&M-EST. PATIENT-LVL IV: CPT | Mod: PBBFAC,,, | Performed by: INTERNAL MEDICINE

## 2023-07-27 PROCEDURE — 3077F PR MOST RECENT SYSTOLIC BLOOD PRESSURE >= 140 MM HG: ICD-10-PCS | Mod: CPTII,S$GLB,, | Performed by: INTERNAL MEDICINE

## 2023-07-27 PROCEDURE — 3008F PR BODY MASS INDEX (BMI) DOCUMENTED: ICD-10-PCS | Mod: CPTII,S$GLB,, | Performed by: INTERNAL MEDICINE

## 2023-07-27 PROCEDURE — 99215 OFFICE O/P EST HI 40 MIN: CPT | Mod: S$GLB,,, | Performed by: INTERNAL MEDICINE

## 2023-07-27 PROCEDURE — 3077F SYST BP >= 140 MM HG: CPT | Mod: CPTII,S$GLB,, | Performed by: INTERNAL MEDICINE

## 2023-07-27 NOTE — PATIENT INSTRUCTIONS
Assessment/Plan:  Chanelle Downey is a 63 y.o. female with HTN, HLD, smoking, severe obesity, who presents for  a follow up appointment.    Edema of both lower legs- Due to BLE lymphedema and possible venous insufficiency.  Lymphedema clinic just started this week, noticed some improvement.BLE Arterial Ultrasound on 4/26/23 revealed no PAD, but KYA's are unreliable due to non-compressible vessels. BLE Venous Ultrasound on 4/26/23 revealed left greater saphenous vein has reflux, no DVT. Continue bumex 0.5 mg daily. Recommend wearing graduated compression hose.  Limit sodium intake to 2000 mg daily.  Limit volume intake to 1.5 L daily.  Elevate legs when resting.  Fell twice in last 2 weeks, asking for the disability; advised to ask PCP for disability.   Reported palpitations daily intermittently for last 30 years, evaluated in the past more than 5 years ago. She has no claudication or tissue loss.  Her walking is limited by chronic knee pain.       2. HTN- Continue current medications.    3. HLD-  on 2/6/2023. Continue atorvastatin 40 mg daily.      4. Severe Obesity- Encouraged to Bariatric Medicine for assistance with weight loss. Exercise, diet, life style modifications explained.    5. Bilateral Knee Pain- Following orthopedic surgeon for knee pain. Varicose veins of both lower extremities    6. Smoking- Smoking reduced 2-3 cigarettes a day, before the previous clinic visit she was smoking 10 cigarettes a day for 30 years.  Encouraged.    7. Palpitations: Check nuclear stress test, echocardiogram and 30 day event monitor    Follow up in 3 months with lipids prior

## 2023-07-31 ENCOUNTER — OFFICE VISIT (OUTPATIENT)
Dept: INTERNAL MEDICINE | Facility: CLINIC | Age: 63
End: 2023-07-31
Payer: COMMERCIAL

## 2023-07-31 VITALS
DIASTOLIC BLOOD PRESSURE: 70 MMHG | TEMPERATURE: 98 F | HEART RATE: 81 BPM | SYSTOLIC BLOOD PRESSURE: 121 MMHG | OXYGEN SATURATION: 97 % | WEIGHT: 207.25 LBS | BODY MASS INDEX: 40.69 KG/M2 | HEIGHT: 60 IN

## 2023-07-31 DIAGNOSIS — Z12.12 SCREENING FOR COLORECTAL CANCER: ICD-10-CM

## 2023-07-31 DIAGNOSIS — G89.4 CHRONIC PAIN SYNDROME: ICD-10-CM

## 2023-07-31 DIAGNOSIS — M79.605 LEG PAIN, BILATERAL: ICD-10-CM

## 2023-07-31 DIAGNOSIS — G89.29 CHRONIC PAIN OF BOTH KNEES: ICD-10-CM

## 2023-07-31 DIAGNOSIS — I89.0 LYMPHEDEMA OF BOTH LOWER EXTREMITIES: ICD-10-CM

## 2023-07-31 DIAGNOSIS — Z12.11 SCREENING FOR COLORECTAL CANCER: ICD-10-CM

## 2023-07-31 DIAGNOSIS — R60.0 EDEMA OF BOTH LOWER EXTREMITIES: ICD-10-CM

## 2023-07-31 DIAGNOSIS — M48.02 DEGENERATIVE CERVICAL SPINAL STENOSIS: ICD-10-CM

## 2023-07-31 DIAGNOSIS — M25.562 CHRONIC PAIN OF BOTH KNEES: ICD-10-CM

## 2023-07-31 DIAGNOSIS — R29.6 FREQUENT FALLS: Primary | ICD-10-CM

## 2023-07-31 DIAGNOSIS — M25.561 CHRONIC PAIN OF BOTH KNEES: ICD-10-CM

## 2023-07-31 DIAGNOSIS — R26.81 GAIT INSTABILITY: ICD-10-CM

## 2023-07-31 DIAGNOSIS — M79.604 LEG PAIN, BILATERAL: ICD-10-CM

## 2023-07-31 PROCEDURE — 1159F PR MEDICATION LIST DOCUMENTED IN MEDICAL RECORD: ICD-10-PCS | Mod: CPTII,S$GLB,, | Performed by: STUDENT IN AN ORGANIZED HEALTH CARE EDUCATION/TRAINING PROGRAM

## 2023-07-31 PROCEDURE — 3074F SYST BP LT 130 MM HG: CPT | Mod: CPTII,S$GLB,, | Performed by: STUDENT IN AN ORGANIZED HEALTH CARE EDUCATION/TRAINING PROGRAM

## 2023-07-31 PROCEDURE — 99999 PR PBB SHADOW E&M-EST. PATIENT-LVL V: CPT | Mod: PBBFAC,,, | Performed by: STUDENT IN AN ORGANIZED HEALTH CARE EDUCATION/TRAINING PROGRAM

## 2023-07-31 PROCEDURE — 1160F RVW MEDS BY RX/DR IN RCRD: CPT | Mod: CPTII,S$GLB,, | Performed by: STUDENT IN AN ORGANIZED HEALTH CARE EDUCATION/TRAINING PROGRAM

## 2023-07-31 PROCEDURE — 3008F PR BODY MASS INDEX (BMI) DOCUMENTED: ICD-10-PCS | Mod: CPTII,S$GLB,, | Performed by: STUDENT IN AN ORGANIZED HEALTH CARE EDUCATION/TRAINING PROGRAM

## 2023-07-31 PROCEDURE — 3078F PR MOST RECENT DIASTOLIC BLOOD PRESSURE < 80 MM HG: ICD-10-PCS | Mod: CPTII,S$GLB,, | Performed by: STUDENT IN AN ORGANIZED HEALTH CARE EDUCATION/TRAINING PROGRAM

## 2023-07-31 PROCEDURE — 3044F HG A1C LEVEL LT 7.0%: CPT | Mod: CPTII,S$GLB,, | Performed by: STUDENT IN AN ORGANIZED HEALTH CARE EDUCATION/TRAINING PROGRAM

## 2023-07-31 PROCEDURE — 3078F DIAST BP <80 MM HG: CPT | Mod: CPTII,S$GLB,, | Performed by: STUDENT IN AN ORGANIZED HEALTH CARE EDUCATION/TRAINING PROGRAM

## 2023-07-31 PROCEDURE — 3044F PR MOST RECENT HEMOGLOBIN A1C LEVEL <7.0%: ICD-10-PCS | Mod: CPTII,S$GLB,, | Performed by: STUDENT IN AN ORGANIZED HEALTH CARE EDUCATION/TRAINING PROGRAM

## 2023-07-31 PROCEDURE — 99214 PR OFFICE/OUTPT VISIT, EST, LEVL IV, 30-39 MIN: ICD-10-PCS | Mod: S$GLB,,, | Performed by: STUDENT IN AN ORGANIZED HEALTH CARE EDUCATION/TRAINING PROGRAM

## 2023-07-31 PROCEDURE — 1159F MED LIST DOCD IN RCRD: CPT | Mod: CPTII,S$GLB,, | Performed by: STUDENT IN AN ORGANIZED HEALTH CARE EDUCATION/TRAINING PROGRAM

## 2023-07-31 PROCEDURE — 1160F PR REVIEW ALL MEDS BY PRESCRIBER/CLIN PHARMACIST DOCUMENTED: ICD-10-PCS | Mod: CPTII,S$GLB,, | Performed by: STUDENT IN AN ORGANIZED HEALTH CARE EDUCATION/TRAINING PROGRAM

## 2023-07-31 PROCEDURE — 3074F PR MOST RECENT SYSTOLIC BLOOD PRESSURE < 130 MM HG: ICD-10-PCS | Mod: CPTII,S$GLB,, | Performed by: STUDENT IN AN ORGANIZED HEALTH CARE EDUCATION/TRAINING PROGRAM

## 2023-07-31 PROCEDURE — 99999 PR PBB SHADOW E&M-EST. PATIENT-LVL V: ICD-10-PCS | Mod: PBBFAC,,, | Performed by: STUDENT IN AN ORGANIZED HEALTH CARE EDUCATION/TRAINING PROGRAM

## 2023-07-31 PROCEDURE — 3008F BODY MASS INDEX DOCD: CPT | Mod: CPTII,S$GLB,, | Performed by: STUDENT IN AN ORGANIZED HEALTH CARE EDUCATION/TRAINING PROGRAM

## 2023-07-31 PROCEDURE — 99214 OFFICE O/P EST MOD 30 MIN: CPT | Mod: S$GLB,,, | Performed by: STUDENT IN AN ORGANIZED HEALTH CARE EDUCATION/TRAINING PROGRAM

## 2023-07-31 NOTE — PATIENT INSTRUCTIONS
Talk to your work's HR department about filing for disability. Send paperwork to our office.     Follow up with Physical Therapy for balance training.     Complete FIT kit, mail off per the instructions on kit.

## 2023-07-31 NOTE — PROGRESS NOTES
SUBJECTIVE     Chief Complaint   Patient presents with    Follow-up     Pt wants to discuss disability       HPI  Chanelle Downey is a 63 y.o. female with  HTN, nicotine dependence, GERD, depression, methadone dependence since 2005, cervical radiculopathy, lumbar discectomy and fusion, OA of the knee, subclinical hypothyroidism, varicose veins, BLE lymphedema, possible venous insuffiencey  that presents for follow-up. LOV 2/27/23.     Coming to clinic today due to increased falls. Will feel unstable and collapse. Has to use cane. 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 No LOC, hits to head in falls. Most recent falls due to tripping outside.     Her ability to be stable is caused by chronic pain in spine from spinal stenosis, chronic lower back pain, chronic osteoarthritis pain in b/l knees,  and lower extremity swelling. Swelling makes legs heavy, hard to  feet. Works 12 and 8 hour shifts, which are physically taxing on her, will take several days to get energy back.     No recent falls in the home. No carpets, cords on ground in the home setting.     Lower extremity swelling. Work up suggestive of lymphedema and possibly venous insuffiencey. Established with lymphedema clinic with some improvement. Followed with vascular medicine as well. On Bumex 0.5 mg qd with good effect.       PAST MEDICAL HISTORY:  Past Medical History:   Diagnosis Date    Back pain, chronic     Biliary colic 6/11/2018    Calculus of gallbladder without cholecystitis without obstruction 5/14/2018    Cervical radiculopathy 3/21/2016    01/17/18--clinic visit, pt describes intermittent BUE numbness/tingling throughout entire arm/hand.  Negative Spurling's test bilaterally.    Cervical stenosis of spinal canal 3/21/2016    Degenerative cervical spinal stenosis 2/10/2016    Depression     Flat back syndrome, postprocedural 1/17/2018    Dx 10/2016 when seen in NSGY clinic.  Presents to  Neurology clinic 18 for worsening gait, LBP, LLE weakness, and RLE neuropathic pain.    HSV (herpes simplex virus) anogenital infection 2016       PAST SURGICAL HISTORY:  Past Surgical History:   Procedure Laterality Date    APPENDECTOMY       SECTION      EYE SURGERY      For strabismus    HYSTERECTOMY      LAPAROSCOPIC CHOLECYSTECTOMY N/A 2018    Procedure: CHOLECYSTECTOMY, LAPAROSCOPIC ;  Surgeon: Yonatan Berry MD;  Location: [facility] OR [address] [place] [address];  Service: General;  Laterality: N/A;    LUMBAR DISCECTOMY      Unknown vertebra    LUMBAR FUSION  2010    L4-S1 fusion reported    TONSILLECTOMY         FAMILY HISTORY:  Family History   Problem Relation Age of Onset    Cancer Mother         non hodgkins lymphoma    Alcohol abuse Mother     Arthritis Mother     Depression Mother     Hypertension Mother     Heart disease Mother     Mental illness Daughter     Birth defects Daughter        ALLERGIES AND MEDICATIONS: updated and reviewed.  Review of patient's allergies indicates:  No Known Allergies  Current Outpatient Medications   Medication Sig Dispense Refill    aspirin 81 mg Cap Take 81 mg by mouth Daily.      atorvastatin (LIPITOR) 40 MG tablet Take 1 tablet (40 mg total) by mouth once daily. 90 tablet 3    bumetanide (BUMEX) 0.5 MG Tab Take 1 tablet (0.5 mg total) by mouth once daily. 90 tablet 3    DULoxetine (CYMBALTA) 60 MG capsule Take 1 capsule (60 mg total) by mouth 2 (two) times daily. 180 capsule 3    ergocalciferol, vitamin D2, (VITAMIN D ORAL) Take 1 capsule by mouth Daily.      hydroCHLOROthiazide (HYDRODIURIL) 12.5 MG Tab Take 1 tablet (12.5 mg total) by mouth once daily. 90 tablet 3    meloxicam (MOBIC) 15 MG tablet Take 1 tablet (15 mg total) by mouth daily as needed for Pain. 30 tablet 1    methadone HCl (METHADONE ORAL) Take 85 mg by mouth once daily.      omeprazole (PRILOSEC) 40 MG capsule Take 1 capsule (40 mg total) by mouth once daily. 90 capsule 3     Ubidecar/Fish Oil/Omega-3/Jude (CO Q10-FISH OIL-OMEGA 3-E ORAL) Take 1 capsule by mouth Daily.      valACYclovir (VALTREX) 500 MG tablet Take 1 tablet (500 mg total) by mouth once daily. 90 tablet 3    vitamin D (VITAMIN D3) 1000 units Tab Take 1,000 Units by mouth once daily.      ZINC ORAL Take 1 capsule by mouth Daily.       No current facility-administered medications for this visit.       ROS  Review of Systems   Constitutional:  Negative for activity change, chills and fever.   HENT:  Negative for congestion and hearing loss.    Eyes:  Negative for pain and visual disturbance.   Respiratory:  Negative for cough and shortness of breath.    Cardiovascular:  Positive for leg swelling. Negative for chest pain and palpitations.   Gastrointestinal:  Negative for abdominal pain, constipation, diarrhea, nausea and vomiting.   Endocrine: Negative.    Genitourinary: Negative.    Musculoskeletal:  Positive for arthralgias, back pain, gait problem and neck pain. Negative for myalgias.   Skin: Negative.    Allergic/Immunologic: Negative.    Neurological:  Negative for dizziness, light-headedness and headaches.   Hematological: Negative.          OBJECTIVE     Physical Exam  Vitals:    07/31/23 0942   BP: 121/70   Pulse: 81   Temp: 98.3 °F (36.8 °C)    Body mass index is 40.47 kg/m².            Physical Exam  Vitals reviewed.   Constitutional:       General: She is not in acute distress.     Appearance: Normal appearance. She is obese.   HENT:      Head: Normocephalic and atraumatic.      Mouth/Throat:      Mouth: Mucous membranes are moist.      Pharynx: Oropharynx is clear.   Eyes:      Extraocular Movements: Extraocular movements intact.      Conjunctiva/sclera: Conjunctivae normal.      Pupils: Pupils are equal, round, and reactive to light.   Cardiovascular:      Rate and Rhythm: Normal rate and regular rhythm.      Pulses: Normal pulses.      Heart sounds: Normal heart sounds.   Pulmonary:      Effort: Pulmonary  effort is normal.      Breath sounds: Normal breath sounds.   Abdominal:      General: There is no distension.   Musculoskeletal:         General: Normal range of motion.      Cervical back: Normal range of motion and neck supple.      Right lower leg: Edema (1+ up to mid-shin) present.      Left lower leg: Edema (1+ up to mid-shin) present.   Skin:     General: Skin is warm and dry.   Neurological:      General: No focal deficit present.      Mental Status: She is alert.   Psychiatric:         Mood and Affect: Mood normal.         Behavior: Behavior normal.           Health Maintenance         Date Due Completion Date    COVID-19 Vaccine (1) Never done ---    Pneumococcal Vaccines (Age 0-64) (1 - PCV) Never done ---    Colorectal Cancer Screening Never done ---    LDCT Lung Screen Never done ---    Shingles Vaccine (1 of 2) Never done ---    Influenza Vaccine (1) 09/01/2023 9/16/2016    Mammogram 02/15/2024 2/15/2023    Hemoglobin A1c (Diabetic Prevention Screening) 02/16/2026 2/16/2023    Lipid Panel 02/16/2028 2/16/2023    TETANUS VACCINE 05/20/2028 5/20/2018              ASSESSMENT     63 y.o. female with     1. Frequent falls    2. Degenerative cervical spinal stenosis    3. Chronic pain syndrome    4. Chronic pain of both knees    5. Leg pain, bilateral    6. Gait instability    7. Edema of both lower extremities    8. Lymphedema of both lower extremities    9. Screening for colorectal cancer        PLAN:     1. Frequent falls  - Fall precautions reviewed with patient. Refer to PT for balance training.   - Ambulatory referral/consult to Physical/Occupational Therapy; Future    2. Degenerative cervical spinal stenosis  3. Chronic pain syndrome  4. Chronic pain of both knees  5. Leg pain, bilateral  - Worsened when working full shifts. Continue Duloxetine, Mobic. Patient to talk with her HR department about applying for disability at work.     6. Gait instability  - Ambulatory referral/consult to  Physical/Occupational Therapy; Future    7. Edema of both lower extremities  8. Lymphedema of both lower extremities  - Has noted some improvement with recent establishment at Lymphedema clinic continue follow up. Continue elevating feet, wearing compression stockings, watching sodium intake.    9. Screening for colorectal cancer  - Fecal Immunochemical Test (iFOBT); Future        RTC in 6 months, earlier PRN     Quinton Miller MD  Family Medicine  [facility]  07/31/2023    This document was created using voice recognition software ([employer]). Although it may be edited, this document may contain errors related to incorrect recognition of the spoken word. Please call the physician if clarification is needed.           No follow-ups on file.

## 2023-07-31 NOTE — LETTER
July 31, 2023      [facility]  [address]  [facility] [address]  Phone: [phone]  Fax: [phone]       Patient: Chanelle Downey   YOB: 1960  Date of Visit: 07/31/2023    To Whom It May Concern:    Ayaka Downey  was at [facility] on 07/31/2023. She may return to work/school on 07/31/23 with restrictions--recommend decrease work schedule to 2.5 shifts per week, no more than 3 days a week. If you have any questions or concerns, or if I can be of further assistance, please do not hesitate to contact me.    Sincerely,      Quinton Miller MD

## 2023-08-02 ENCOUNTER — CLINICAL SUPPORT (OUTPATIENT)
Dept: REHABILITATION | Facility: HOSPITAL | Age: 63
End: 2023-08-02
Payer: COMMERCIAL

## 2023-08-02 DIAGNOSIS — I89.0 LYMPHEDEMA OF BOTH LOWER EXTREMITIES: Primary | ICD-10-CM

## 2023-08-02 PROCEDURE — 97140 MANUAL THERAPY 1/> REGIONS: CPT

## 2023-08-02 NOTE — PROGRESS NOTES
[facility]  Occupational Therapy Treatment Note  Lymphedema    Date: 8/2/2023  Name: Chanelle Downey  Clinic Number: [id number]    Therapy Diagnosis:   Encounter Diagnosis   Name Primary?    Lymphedema of both lower extremities Yes       Physician: Luis Eduardo Eckert MD*    Physician Orders: Eval and Treat  Medical Diagnosis: R60.0 (ICD-10-CM) - Edema of both lower legs  Evaluation Date: 7/18/2023  Insurance Authorization Period Expiration: 4/19/2024  Plan of Care Certification Period: 10/11/2023  Visit # / Visits authorized: 3 / 20  FOTO: see media, 1 / 3     Precautions:  Standard and Fall    Time In: 1:21  Time Out: 2:15  Total Billable Time: 54 minutes     SUBJECTIVE     Pt reports: edema is improving in bandages. Edema wear is uncomfortable on feet so she has been rolling them above ankle. Requesting referrals to podiatry and neurology. Reports recent fall at work, PCP aware.   Chanelle reported wearing compression outside of therapy visits: yes, edema wear size medium  Response to previous treatment: appeared reduced   Functional change: none    Pain: 0/10      OBJECTIVE     Pt arrived wearing edema wear on BLE, ambulating with cane.     Compression garment status: provided product information for 20-30 mm hg stockings from [employer].   Compression Rx status: not requested  Pneumatic pump status: established with [employer]     Objective Measures updated at progress report unless specified.    Treatment     Chanelle received the treatments listed below:     Manual therapy techniques were applied for 54 minutes, including:    manual therapy techniques: Complete Decongestive Therapy was applied to the: BLE for 54 minutes, including: manual lymphatic drainage and short stretch compression bandaging     MANUAL LYMPHATIC DRAINAGE: BLE   Supine with lower extremities elevated:  Deep abdominal techniques  Leg treatment, knee, lower leg, ankle, dorsum of foot  Rework: leg, inguinal lymph  nodes, abdominal techniques    MULTILAYERED BANDAGING:    Issued supplies and bandaged BLE via modified technique to accommodate footwear    Use of Cavilon moisturizer for dry skin  Cotton stockinet: size 9  Cellona padding padding from dorsum of foot to knee,   2-8cm and 2-10cm Rosidal K rolls foot to distal knee      Pt to leave intact 48 hrs as tolerated, discontinue with any problems, return rolled bandages next session. Wash and wear schedules confirmed.     Provided edema wear size small as temporary compression.    Product information provided: 20-30 mm hg knee high stockings, [employer].       Patient Education and Home Exercises      Education provided:   - falls prevention   - Progress towards goals     Written Home Exercises Provided: yes.  Exercises were reviewed and Chanelle was able to demonstrate them prior to the end of the session.  Chanelle demonstrated good  understanding of the HEP provided. See EMR under Patient Instructions for exercises provided during therapy sessions.       Assessment     Education provided re: importance of compression over feet to prevent fluid pocketing. Provided edema wear size small to assess better fit. Pt to self purchase 20-30 mm hg stockings. Pleased with edema reductions.     Pt would continue to benefit from skilled OT.      Chanelle is progressing well towards her goals and there are no updates to goals at this time. Pt prognosis is Good.     Pt will continue to benefit from skilled outpatient occupational therapy to address the deficits listed in the problem list on initial evaluation provide pt/family education and to maximize pt's level of independence in the home and community environment.     Pt's spiritual, cultural and educational needs considered and pt agreeable to plan of care and goals.    Anticipated barriers to occupational therapy: none    Goals:    Short Term Goals: (4 weeks)  Goals: Progressing / MET   1. Patient will demonstrate 100% knowledge of lymphedema  precautions and signs of infection to allow for reduced lymphedema risk, infection risk, and/or exacerbation of condition.  NOT MET   2. Patient will tolerate daily activities wearing multilayered bandaging to allow for lymphatic drainage support, skin elasticity, and reduction in shape and size of limb.  NOT MET   3. Patient and/or caregiver will order/obtain appropriate compression garments to maintain lymphatic and venous support.  NOT MET   4. Patient will show decreased total girth measurement in BLE by up to 2 cm to allow for lower extremity symmetry, shoe and clothing choice, and ability to apply needed compression. NOT MET      Long Term Goals: (8 weeks)  Goals: Progressing / MET   1. Patient and/or caregiver to tete/doff compression garment independently and with daily compliance to assist in lymphedema management, skin elasticity, and tissue density NOT MET   2. Patient and/or caregiver will be independent in use of pneumatic compression pump or self manual lymphatic drainage techniques to areas within reach to enhance lymphatic drainage and skin condition.  NOT MET   3. Patient to be independent and compliant with home exercise program to allow for increased function in affected limb. NOT MET   4. Patient will show decreased total girth measurement in BLE by up to 4 cm  to allow for lower extremity symmetry, shoe and clothing choice, and ability to apply needed compression daily. NOT MET          PLAN     Updates/Grading for next session: none    Aissatou Roach, OT, CLWT

## 2023-08-03 ENCOUNTER — CLINICAL SUPPORT (OUTPATIENT)
Dept: REHABILITATION | Facility: HOSPITAL | Age: 63
End: 2023-08-03
Payer: COMMERCIAL

## 2023-08-03 DIAGNOSIS — R60.0 EDEMA OF BOTH LOWER EXTREMITIES: Primary | ICD-10-CM

## 2023-08-03 DIAGNOSIS — I89.0 LYMPHEDEMA OF BOTH LOWER EXTREMITIES: ICD-10-CM

## 2023-08-03 PROCEDURE — 97110 THERAPEUTIC EXERCISES: CPT

## 2023-08-03 PROCEDURE — 97140 MANUAL THERAPY 1/> REGIONS: CPT

## 2023-08-03 NOTE — PROGRESS NOTES
[facility]  Occupational Therapy Treatment Note  Lymphedema    Date: 8/3/2023  Name: Chanelle Downey  Clinic Number: [id number]    Therapy Diagnosis:   Encounter Diagnoses   Name Primary?    Edema of both lower extremities Yes    Lymphedema of both lower extremities          Physician: Luis Eduardo Eckert MD*    Physician Orders: Eval and Treat  Medical Diagnosis: R60.0 (ICD-10-CM) - Edema of both lower legs  Evaluation Date: 7/18/2023  Insurance Authorization Period Expiration: 4/19/2024  Plan of Care Certification Period: 10/11/2023  Visit # / Visits authorized: 4 / 20  FOTO: see media, 2 / 3     Precautions:  Standard and Fall    Time In: 10:07  Time Out: 11:00  Total Billable Time: 53 minutes     SUBJECTIVE     Pt reports: no concerns. Pleased with BLE reductions.   Chanelle reported wearing compression outside of therapy visits: yes, edema wear size medium  Response to previous treatment: reduced, non-pitting.   Functional change: none    Pain: 0/10      OBJECTIVE     Pt arrived wearing edema wear on BLE, ambulating with cane.     Compression garment status: provided product information for 20-30 mm hg stockings from [employer].   Compression Rx status: not requested  Pneumatic pump status: established with [employer]     Objective Measures updated at progress report unless specified.    Treatment     Chanelle received the treatments listed below:       Manual therapy techniques: Complete Decongestive Therapy was applied to the: BLE for 43 minutes, including: manual lymphatic drainage and short stretch compression bandaging     MANUAL LYMPHATIC DRAINAGE: BLE   Supine with lower extremities elevated:  Deep abdominal techniques  Leg treatment, knee, lower leg, ankle, dorsum of foot  Rework: leg, inguinal lymph nodes, abdominal techniques    MULTILAYERED BANDAGING:    Issued supplies and bandaged BLE via modified technique to accommodate footwear    Use of Cavilon moisturizer for dry  PT EVALUATION       05/28/22 1230   Patient Data   Rehab Impairment Impairment of mobility, safety and Activities of Daily Living (ADLs) due to Orthopedic Disorders   Etiologic Diagnosis orthopedic scoliosis flat back syndrome thoracolumbar DJD   Date of Onset 05/24/22   Support System   Name Keya Tellez wife   Home Setup   Type of Home Multi Level   Method of Entry Hand Rail Left   Number of Stairs 1   Number of Stairs in Home   (FF but 1st flr set up)   First Floor Setup Available Yes   Available Equipment Single Point Cane   Baseline Information   Outpatient Services Received Prior to Admission Physical Therapy   Transportation    Prior Device(s) Used Single KAL   Prior Level of Function   Self-Care 3  Independent - Patient completed the activities by him/herself, with or without an assistive device, with no assistance from a helper  Indoor-Mobility (Ambulation) 3  Independent - Patient completed the activities by him/herself, with or without an assistive device, with no assistance from a helper  Stairs 3  Independent - Patient completed the activities by him/herself, with or without an assistive device, with no assistance from a helper  Functional Cognition 3  Independent - Patient completed the activities by him/herself, with or without an assistive device, with no assistance from a helper  Prior Device Used Z  None of the above   Falls in the Last Year   Number of falls in the past 12 months 0   Patient Preference   Nickname (Patient Preference) Carlos   Restrictions/Precautions   Precautions Fall Risk;Pain;Supervision on toilet/commode;Spinal precautions; Bed/chair alarms;Limb alert   RUE Weight Bearing Per Order WBAT   LUE Weight Bearing Per Order WBAT   RLE Weight Bearing Per Order WBAT   LLE Weight Bearing Per Order WBAT   Braces or Orthoses LSO  (when OOB)   Pain Assessment   Pain Assessment Tool 0-10   Pain Score 6   Pain Location/Orientation Location: Back   Pain Onset/Description Onset: Ongoing   Effect of Pain on Daily Activities mobility   Patient's Stated Pain Goal No pain   Hospital Pain Intervention(s) Medication (See MAR); Repositioned   Transfer Bed/Chair/Wheelchair   Limitations Noted In Balance;LE Strength;Pain Management; Endurance   Adaptive Equipment Roller Walker   Findings cueing for hand placement   Type of Assistance Needed Incidental touching;Verbal cues; Adaptive equipment   Physical Assistance Level No physical assistance   Comment CGA with RW   Chair/Bed-to-Chair Transfer CARE Score 4   Roll Left and Right   Type of Assistance Needed Supervision;Verbal cues; Adaptive equipment   Physical Assistance Level No physical assistance   Comment bedrail   Roll Left and Right CARE Score 4   Sit to Lying   Type of Assistance Needed Incidental touching;Verbal cues; Adaptive equipment   Physical Assistance Level No physical assistance   Comment CGA and cueing for log rolling technique   Sit to Lying CARE Score 4   Lying to Sitting on Side of Bed   Type of Assistance Needed Incidental touching; Adaptive equipment   Physical Assistance Level No physical assistance   Comment cueing for log rolling technique   Lying to Sitting on Side of Bed CARE Score 4   Sit to Stand   Type of Assistance Needed Incidental touching;Verbal cues; Adaptive equipment   Physical Assistance Level No physical assistance   Comment CGA with RW   Sit to Stand CARE Score 4   Picking Up Object   Type of Assistance Needed Incidental touching;Verbal cues; Adaptive equipment   Physical Assistance Level No physical assistance   Comment CGA with RW and reacher   Picking Up Object CARE Score 4   Car Transfer   Type of Assistance Needed Incidental touching;Verbal cues; Adaptive equipment   Physical Assistance Level No physical assistance   Comment CGA with RW and cushion on seat   Cueing to maintain spinal precautions   Car Transfer CARE Score 4   Ambulation   Primary Mode of Locomotion Prior to Admission Walk skin  Cotton stockinet: size 9  Cellona padding padding from dorsum of foot to knee,   2-8cm and 2-10cm Rosidal K rolls foot to distal knee      Pt to leave intact 48 hrs as tolerated, discontinue with any problems, return rolled bandages next session. Wash and wear schedules confirmed.     Provided edema wear size small as temporary compression.    Product information provided: 20-30 mm hg knee high stockings, [employer].     Therapeutic exercise was completed for 10 minutes, including:      Ankle pumps 3 x 10  Heel raises 3 x 10  Long arc quad with 1' hold, 3 x 10  Seated marching 3 x 10    Educated on benefit of completing exercise in compression to maximize lymphatic return.        Patient Education and Home Exercises      Education provided:   - falls prevention   - Progress towards goals     Written Home Exercises Provided: yes.  Exercises were reviewed and Chanelle was able to demonstrate them prior to the end of the session.  Chanelle demonstrated good  understanding of the HEP provided. See EMR under Patient Instructions for exercises provided during therapy sessions.       Assessment     Edema reduced. Plan to assess containment in edema wear next week given cost concerns for new stockings. Anticipate discharge in upcoming weeks.     Pt would continue to benefit from skilled OT.      Chanelle is progressing well towards her goals and there are no updates to goals at this time. Pt prognosis is Good.     Pt will continue to benefit from skilled outpatient occupational therapy to address the deficits listed in the problem list on initial evaluation provide pt/family education and to maximize pt's level of independence in the home and community environment.     Pt's spiritual, cultural and educational needs considered and pt agreeable to plan of care and goals.    Anticipated barriers to occupational therapy: none    Goals:    Short Term Goals: (4 weeks)  Goals: Progressing / MET   1. Patient will demonstrate 100% knowledge of  Distance Walked (feet)   (160ft x 2)   Assist Device Roller Walker   Gait Pattern Inconsistant Leisa; Slow Leisa; Antalgic; Improper weight shift   Limitations Noted In Balance; Endurance;Speed;Strength;Posture   Provided Assistance with: Balance   Walk Assist Level Contact Guard   Findings cueing for posture, appropriated distance from RW and inc step length  Walk 10 Feet   Type of Assistance Needed Physical assistance;Verbal cues; Adaptive equipment   Physical Assistance Level No physical assistance   Comment CGA with RW   Walk 10 Feet CARE Score -   Walk 50 Feet with Two Turns   Type of Assistance Needed Incidental touching;Verbal cues; Adaptive equipment   Physical Assistance Level No physical assistance   Walk 50 Feet with Two Turns CARE Score 4   Walk 150 Feet   Type of Assistance Needed Incidental touching;Verbal cues; Adaptive equipment   Physical Assistance Level No physical assistance   Comment CGA with RW   Walk 150 Feet CARE Score 4   Walking 10 Feet on Uneven Surfaces   Type of Assistance Needed Incidental touching;Verbal cues; Adaptive equipment   Physical Assistance Level No physical assistance   Comment CGA with RW   Walking 10 Feet on Uneven Surfaces CARE Score 4   Wheel 50 Feet with Two Turns   Reason if not Attempted Activity not applicable   Wheel 50 Feet with Two Turns CARE Score 9   Wheel 150 Feet   Reason if not Attempted Activity not applicable   Wheel 959 Feet CARE Score 9   Curb or Single Stair   Style negotiated Curb   Type of Assistance Needed Physical assistance;Verbal cues; Adaptive equipment   Physical Assistance Level 25% or less   Comment RW, min assist   1 Step (Curb) CARE Score 3   4 Steps   Reason if not Attempted Refused to perform  (due to fatigue)   4 Steps CARE Score 7   12 Steps   Reason if not Attempted Activity not applicable  (not pt's goal has 1st flr set up)   12 Steps CARE Score 9   Stairs   Type Curb   # of Steps 1   Assist Devices Roller Walker   Findings cuieng for lymphedema precautions and signs of infection to allow for reduced lymphedema risk, infection risk, and/or exacerbation of condition.  NOT MET   2. Patient will tolerate daily activities wearing multilayered bandaging to allow for lymphatic drainage support, skin elasticity, and reduction in shape and size of limb.  NOT MET   3. Patient and/or caregiver will order/obtain appropriate compression garments to maintain lymphatic and venous support.  NOT MET   4. Patient will show decreased total girth measurement in BLE by up to 2 cm to allow for lower extremity symmetry, shoe and clothing choice, and ability to apply needed compression. NOT MET      Long Term Goals: (8 weeks)  Goals: Progressing / MET   1. Patient and/or caregiver to tete/doff compression garment independently and with daily compliance to assist in lymphedema management, skin elasticity, and tissue density NOT MET   2. Patient and/or caregiver will be independent in use of pneumatic compression pump or self manual lymphatic drainage techniques to areas within reach to enhance lymphatic drainage and skin condition.  NOT MET   3. Patient to be independent and compliant with home exercise program to allow for increased function in affected limb. NOT MET   4. Patient will show decreased total girth measurement in BLE by up to 4 cm  to allow for lower extremity symmetry, shoe and clothing choice, and ability to apply needed compression daily. NOT MET          PLAN     Updates/Grading for next session: none    Aissatou Roach, OT, CLWT       sequence  Ascend leading c R   RLE Assessment   RLE Assessment WNL   Strength LLE   L Hip Flexion 3+/5   L Knee Extension 4-/5   L Ankle Dorsiflexion 4-/5   Cognition   Overall Cognitive Status WFL   Arousal/Participation Alert; Cooperative   Orientation Level Oriented X4   Following Commands Follows all commands and directions without difficulty   Therapeutic Exercise   Therapeutic Exercise/Activity B hamstring and gastroc str; Seated hip add isometrics, hip abd and hamstring curls c GTB  ankle DF/PF, hip flex 3# R and AROM L   Discharge Information   Patient's Discharge Plan Return to home   Patient's Rehab Expectations back home and do normal things   Barriers to Discharge Home Decreased Strength;Decreased Endurance;Pain   Impressions   Patient is a 73 yo male with h/o of prior spine surgery, chronic low back pain, L5-S1 pseudoarthrosis, and T12-L1 junctional kyphosis with disk space screw penetration who presented electively on 5/24 for L1-S1 revision of segmental hardware, L5-S1 osteotomy, posterior spinal fusion L4-S1, pelvic instrumentation, Transforaminal lumbar interbody fusion L5S1, Cage L5S1, instrumentation L1-pelvis by Dr Don Le  PT eval completed, pt demonstrate deficit in functional mobility, activity tolerance L LE strength and risk for fall  Pt currently needed CGA c RW for transfer, supervision for bed mobility and cueing to maintaining spinal precautions  He amb c R x 160ft x 2, CGA  Prior to admission pt is ind without AD for mobility, he lives with his wife, 1st flr set up and 1 BRENDA  Patient will benefit from skilled PT to address said deficit, to maximized functional mobility independence, consistently maintain spinal precautions, dec risk of fall and burden of care  Pt will be seen in PT 1-2hrs/day x 5-7days/wk x 7-10days  He will participate in there ex, there act, balance traning, gait and modalities prn     PT Therapy Minutes   PT Time In 1230   PT Time Out 1400   PT Total Time (minutes) 90 PT Mode of treatment - Individual (minutes) 90   PT Mode of treatment - Concurrent (minutes) 0   PT Mode of treatment - Group (minutes) 0   PT Mode of treatment - Co-treat (minutes) 0   PT Mode of Treatment - Total time(minutes) 90 minutes   PT Cumulative Minutes 90   Cumulative Minutes   Cumulative therapy minutes 180

## 2023-08-07 DIAGNOSIS — R29.6 FREQUENT FALLS: Primary | ICD-10-CM

## 2023-08-07 DIAGNOSIS — M79.672 FOOT PAIN, BILATERAL: ICD-10-CM

## 2023-08-07 DIAGNOSIS — M79.671 FOOT PAIN, BILATERAL: ICD-10-CM

## 2023-08-10 ENCOUNTER — CLINICAL SUPPORT (OUTPATIENT)
Dept: REHABILITATION | Facility: HOSPITAL | Age: 63
End: 2023-08-10
Payer: COMMERCIAL

## 2023-08-10 DIAGNOSIS — I89.0 LYMPHEDEMA OF BOTH LOWER EXTREMITIES: Primary | ICD-10-CM

## 2023-08-10 PROCEDURE — 97110 THERAPEUTIC EXERCISES: CPT

## 2023-08-10 PROCEDURE — 97140 MANUAL THERAPY 1/> REGIONS: CPT

## 2023-08-10 NOTE — PROGRESS NOTES
[facility] OUTPATIENT THERAPY AND WELLNESS  Occupational Therapy Treatment Note  Lymphedema    Date: 8/10/2023  Name: Chanelle Downey  Clinic Number: [id number]    Therapy Diagnosis:   Encounter Diagnosis   Name Primary?    Lymphedema of both lower extremities Yes         Physician: Luis Eduardo Eckert MD*    Physician Orders: Eval and Treat  Medical Diagnosis: R60.0 (ICD-10-CM) - Edema of both lower legs  Evaluation Date: 7/18/2023  Insurance Authorization Period Expiration: 4/19/2024  Plan of Care Certification Period: 10/11/2023  Visit # / Visits authorized: 5 / 20  FOTO: see media, 2 / 3     Precautions:  Standard and Fall    Time In: 10:07  Time Out: 11:00  Total Billable Time: 53 minutes     SUBJECTIVE     Pt reports: she has been wearing edema wear daily  Chanelle reported wearing compression outside of therapy visits: yes, edema wear size small  Response to previous treatment: reduced, non-pitting.   Functional change: none    Pain: 0/10      OBJECTIVE     Pt arrived wearing edema wear on BLE, ambulating with cane.     Compression garment status: provided product information for 20-30 mm hg stockings from [employer].   Compression Rx status: not requested  Pneumatic pump status: established with [employer]     LE Girth Measurements: taken in supine  LANDMARK RIGHT LE  7/18 LEFT LE  7/18 RIGHT LE  8/10 LEFT LE  8/10   SBP 53.0 cm 52.5 cm - -   10 below SBP 47.0 cm 47.5 cm 45.0 cm 47.0 cm   20 below SBP 44.5 cm 42.0 cm 45.0 cm 45.0 cm   30 below SBP 34.0 cm 32.0 cm 36.0 cm 35.0 cm   35 below SBP 28.0 cm 26.5 cm 31.5 cm 28.5 cm   Ankle 25.0 cm 24.0 cm 26.0 cm 24.5 cm   Forefoot 21.0 cm 21.0 cm 21.0 cm 22.0 cm           Treatment     Chanelle received the treatments listed below:       Manual therapy techniques: Complete Decongestive Therapy was applied to the: BLE for 45 minutes, including: manual lymphatic drainage and short stretch compression bandaging     MANUAL LYMPHATIC DRAINAGE: BLE   Supine with lower  extremities elevated:  Deep abdominal techniques  Leg treatment, knee, lower leg, ankle, dorsum of foot  Rework: leg, inguinal lymph nodes, abdominal techniques    MULTILAYERED BANDAGING:    Issued supplies and bandaged BLE via modified technique to accommodate footwear    Use of Cavilon moisturizer for dry skin  Cotton stockinet: size 9  Cellona padding padding from dorsum of foot to knee,   2-8cm and 2-10cm Rosidal K rolls foot to distal knee      Pt to leave intact 48 hrs as tolerated, discontinue with any problems, return rolled bandages next session. Wash and wear schedules confirmed.     Provided edema wear size small as temporary compression.    Product information provided: 20-30 mm hg knee high stockings, [employer].     Therapeutic exercise was completed for 8 minutes, including:      Ankle pumps 3 x 10  Heel raises 3 x 10  Long arc quad with 1' hold, 3 x 10  Seated marching 3 x 10    Educated on benefit of completing exercise in compression to maximize lymphatic return.        Patient Education and Home Exercises      Education provided:   - falls prevention   - Progress towards goals     Written Home Exercises Provided: yes.  Exercises were reviewed and Chanelle was able to demonstrate them prior to the end of the session.  Chanelle demonstrated good  understanding of the HEP provided. See EMR under Patient Instructions for exercises provided during therapy sessions.       Assessment     No significant change in girth measurements. Will continue to assess weekly.     Pt would continue to benefit from skilled OT.      Chanelle is progressing well towards her goals and there are no updates to goals at this time. Pt prognosis is Good.     Pt will continue to benefit from skilled outpatient occupational therapy to address the deficits listed in the problem list on initial evaluation provide pt/family education and to maximize pt's level of independence in the home and community environment.     Pt's spiritual, cultural and  educational needs considered and pt agreeable to plan of care and goals.    Anticipated barriers to occupational therapy: none    Goals:    Short Term Goals: (4 weeks)  Goals: Progressing / MET   1. Patient will demonstrate 100% knowledge of lymphedema precautions and signs of infection to allow for reduced lymphedema risk, infection risk, and/or exacerbation of condition.  NOT MET   2. Patient will tolerate daily activities wearing multilayered bandaging to allow for lymphatic drainage support, skin elasticity, and reduction in shape and size of limb.  NOT MET   3. Patient and/or caregiver will order/obtain appropriate compression garments to maintain lymphatic and venous support.  NOT MET   4. Patient will show decreased total girth measurement in BLE by up to 2 cm to allow for lower extremity symmetry, shoe and clothing choice, and ability to apply needed compression. NOT MET      Long Term Goals: (8 weeks)  Goals: Progressing / MET   1. Patient and/or caregiver to tete/doff compression garment independently and with daily compliance to assist in lymphedema management, skin elasticity, and tissue density NOT MET   2. Patient and/or caregiver will be independent in use of pneumatic compression pump or self manual lymphatic drainage techniques to areas within reach to enhance lymphatic drainage and skin condition.  NOT MET   3. Patient to be independent and compliant with home exercise program to allow for increased function in affected limb. NOT MET   4. Patient will show decreased total girth measurement in BLE by up to 4 cm  to allow for lower extremity symmetry, shoe and clothing choice, and ability to apply needed compression daily. NOT MET          PLAN     Updates/Grading for next session: none    Aissatou Roach, OT, CLWT

## 2023-08-15 ENCOUNTER — CLINICAL SUPPORT (OUTPATIENT)
Dept: REHABILITATION | Facility: HOSPITAL | Age: 63
End: 2023-08-15
Payer: COMMERCIAL

## 2023-08-15 DIAGNOSIS — I89.0 LYMPHEDEMA OF BOTH LOWER EXTREMITIES: Primary | ICD-10-CM

## 2023-08-15 PROCEDURE — 97140 MANUAL THERAPY 1/> REGIONS: CPT

## 2023-08-15 PROCEDURE — 97110 THERAPEUTIC EXERCISES: CPT

## 2023-08-15 NOTE — PROGRESS NOTES
[facility]  Occupational Therapy Treatment Note  Lymphedema    Date: 8/15/2023  Name: Chanelle Downey  Clinic Number: [id number]    Therapy Diagnosis:   Encounter Diagnosis   Name Primary?    Lymphedema of both lower extremities Yes         Physician: Luis Eduardo Eckert MD*    Physician Orders: Eval and Treat  Medical Diagnosis: R60.0 (ICD-10-CM) - Edema of both lower legs  Evaluation Date: 7/18/2023  Insurance Authorization Period Expiration: 4/19/2024  Plan of Care Certification Period: 10/11/2023  Visit # / Visits authorized: 6 / 20  FOTO: see media, 2 / 3     Precautions:  Standard and Fall    Time In: 11:05  Time Out: 11:45  Total Billable Time: 40 minutes     SUBJECTIVE     Pt reports: she has been wearing edema wear daily. Fatigued today from working over night.  Chanelle reported wearing compression outside of therapy visits: yes, edema wear size small  Response to previous treatment: reduced, non-pitting.   Functional change: none    Pain: 10/10      OBJECTIVE     Pt arrived out of compression, ambulating with cane.     Compression garment status: provided product information for 20-30 mm hg stockings from [employer].   Compression Rx status: not requested  Pneumatic pump status: established with [employer]         Treatment     Chanelle received the treatments listed below:       Manual therapy techniques: Complete Decongestive Therapy was applied to the: BLE for 30 minutes, including: manual lymphatic drainage and short stretch compression bandaging     MANUAL LYMPHATIC DRAINAGE: BLE   Supine with lower extremities elevated:  Deep abdominal techniques  Leg treatment, knee, lower leg, ankle, dorsum of foot  Rework: leg, inguinal lymph nodes, abdominal techniques    MULTILAYERED BANDAGING:    Issued supplies and bandaged BLE via modified technique to accommodate footwear    Use of Cavilon moisturizer for dry skin  Cotton stockinet: size 9  Cellona padding padding from dorsum of foot  to knee,   2-8cm and 2-10cm Rosidal K rolls foot to distal knee      Pt to leave intact 48 hrs as tolerated, discontinue with any problems, return rolled bandages next session. Wash and wear schedules confirmed.     Provided edema wear size small as temporary compression.    Product information provided: 20-30 mm hg knee high stockings, [employer].     Therapeutic exercise was completed for 10 minutes, including:      Ankle pumps 3 x 10  Heel raises 3 x 10  Long arc quad with 1' hold, 3 x 10  Seated marching 3 x 10    Educated on benefit of completing exercise in compression to maximize lymphatic return.        Patient Education and Home Exercises      Education provided:   - falls prevention   - Progress towards goals     Written Home Exercises Provided: yes.  Exercises were reviewed and Chanelle was able to demonstrate them prior to the end of the session.  Chanelle demonstrated good  understanding of the HEP provided. See EMR under Patient Instructions for exercises provided during therapy sessions.       Assessment     Edema appears stable. Plan to re-measure next week and discharge to home management if no longer reducing.     Pt would continue to benefit from skilled OT.      Chanelle is progressing well towards her goals and there are no updates to goals at this time. Pt prognosis is Good.     Pt will continue to benefit from skilled outpatient occupational therapy to address the deficits listed in the problem list on initial evaluation provide pt/family education and to maximize pt's level of independence in the home and community environment.     Pt's spiritual, cultural and educational needs considered and pt agreeable to plan of care and goals.    Anticipated barriers to occupational therapy: none    Goals:    Short Term Goals: (4 weeks)  Goals: Progressing / MET   1. Patient will demonstrate 100% knowledge of lymphedema precautions and signs of infection to allow for reduced lymphedema risk, infection risk, and/or  exacerbation of condition.  NOT MET   2. Patient will tolerate daily activities wearing multilayered bandaging to allow for lymphatic drainage support, skin elasticity, and reduction in shape and size of limb.  NOT MET   3. Patient and/or caregiver will order/obtain appropriate compression garments to maintain lymphatic and venous support.  NOT MET   4. Patient will show decreased total girth measurement in BLE by up to 2 cm to allow for lower extremity symmetry, shoe and clothing choice, and ability to apply needed compression. NOT MET      Long Term Goals: (8 weeks)  Goals: Progressing / MET   1. Patient and/or caregiver to tete/doff compression garment independently and with daily compliance to assist in lymphedema management, skin elasticity, and tissue density NOT MET   2. Patient and/or caregiver will be independent in use of pneumatic compression pump or self manual lymphatic drainage techniques to areas within reach to enhance lymphatic drainage and skin condition.  NOT MET   3. Patient to be independent and compliant with home exercise program to allow for increased function in affected limb. NOT MET   4. Patient will show decreased total girth measurement in BLE by up to 4 cm  to allow for lower extremity symmetry, shoe and clothing choice, and ability to apply needed compression daily. NOT MET          PLAN     Updates/Grading for next session: none    Aissatou Roach, OT, CLWT

## 2023-08-17 ENCOUNTER — TELEPHONE (OUTPATIENT)
Dept: CARDIOLOGY | Facility: HOSPITAL | Age: 63
End: 2023-08-17
Payer: COMMERCIAL

## 2023-08-21 ENCOUNTER — HOSPITAL ENCOUNTER (OUTPATIENT)
Dept: CARDIOLOGY | Facility: HOSPITAL | Age: 63
Discharge: HOME | End: 2023-08-21
Attending: STUDENT IN AN ORGANIZED HEALTH CARE EDUCATION/TRAINING PROGRAM
Payer: COMMERCIAL

## 2023-08-21 DIAGNOSIS — R00.2 PALPITATIONS: ICD-10-CM

## 2023-08-22 ENCOUNTER — CLINICAL SUPPORT (OUTPATIENT)
Dept: REHABILITATION | Facility: HOSPITAL | Age: 63
End: 2023-08-22
Payer: COMMERCIAL

## 2023-08-22 ENCOUNTER — HOSPITAL ENCOUNTER (OUTPATIENT)
Dept: CARDIOLOGY | Facility: HOSPITAL | Age: 63
Discharge: HOME | End: 2023-08-22
Attending: STUDENT IN AN ORGANIZED HEALTH CARE EDUCATION/TRAINING PROGRAM
Payer: COMMERCIAL

## 2023-08-22 VITALS
DIASTOLIC BLOOD PRESSURE: 75 MMHG | HEIGHT: 60 IN | SYSTOLIC BLOOD PRESSURE: 135 MMHG | WEIGHT: 210 LBS | BODY MASS INDEX: 41.23 KG/M2 | RESPIRATION RATE: 16 BRPM | HEART RATE: 90 BPM

## 2023-08-22 DIAGNOSIS — I89.0 LYMPHEDEMA OF BOTH LOWER EXTREMITIES: Primary | ICD-10-CM

## 2023-08-22 LAB
CV STRESS BASE HR: 75 BPM
DIASTOLIC BLOOD PRESSURE: 76 MMHG
EJECTION FRACTION- HIGH: 59 %
END DIASTOLIC INDEX-HIGH: 155 ML/M2
END DIASTOLIC INDEX-LOW: 91 ML/M2
END SYSTOLIC INDEX-HIGH: 78 ML/M2
END SYSTOLIC INDEX-LOW: 40 ML/M2
NUC REST DIASTOLIC VOLUME INDEX: 98
NUC REST EJECTION FRACTION: 55
NUC REST SYSTOLIC VOLUME INDEX: 44
NUC STRESS DIASTOLIC VOLUME INDEX: 91
NUC STRESS EJECTION FRACTION: 51 %
NUC STRESS SYSTOLIC VOLUME INDEX: 44
OHS CV CPX 1 MINUTE RECOVERY HEART RATE: 100 BPM
OHS CV CPX 85 PERCENT MAX PREDICTED HEART RATE MALE: 129
OHS CV CPX MAX PREDICTED HEART RATE: 152
OHS CV CPX PATIENT IS FEMALE: 1
OHS CV CPX PATIENT IS MALE: 0
OHS CV CPX PEAK DIASTOLIC BLOOD PRESSURE: 72 MMHG
OHS CV CPX PEAK HEAR RATE: 98 BPM
OHS CV CPX PEAK RATE PRESSURE PRODUCT: NORMAL
OHS CV CPX PEAK SYSTOLIC BLOOD PRESSURE: 140 MMHG
OHS CV CPX PERCENT MAX PREDICTED HEART RATE ACHIEVED: 64
OHS CV CPX RATE PRESSURE PRODUCT PRESENTING: 9975
RETIRED EF AND QEF - SEE NOTES: 47 %
SYSTOLIC BLOOD PRESSURE: 133 MMHG

## 2023-08-22 PROCEDURE — 78452 NUCLEAR STRESS - CARDIOLOGY INTERPRETED (CUPID ONLY): ICD-10-PCS | Mod: 26,,, | Performed by: INTERNAL MEDICINE

## 2023-08-22 PROCEDURE — 93016 CV STRESS TEST SUPVJ ONLY: CPT | Mod: ,,, | Performed by: INTERNAL MEDICINE

## 2023-08-22 PROCEDURE — A9502 TC99M TETROFOSMIN: HCPCS

## 2023-08-22 PROCEDURE — 78452 HT MUSCLE IMAGE SPECT MULT: CPT | Mod: 26,,, | Performed by: INTERNAL MEDICINE

## 2023-08-22 PROCEDURE — 97110 THERAPEUTIC EXERCISES: CPT

## 2023-08-22 PROCEDURE — 63600175 PHARM REV CODE 636 W HCPCS: Performed by: STUDENT IN AN ORGANIZED HEALTH CARE EDUCATION/TRAINING PROGRAM

## 2023-08-22 PROCEDURE — 93016 NUCLEAR STRESS - CARDIOLOGY INTERPRETED (CUPID ONLY): ICD-10-PCS | Mod: ,,, | Performed by: INTERNAL MEDICINE

## 2023-08-22 PROCEDURE — 93018 NUCLEAR STRESS - CARDIOLOGY INTERPRETED (CUPID ONLY): ICD-10-PCS | Mod: ,,, | Performed by: INTERNAL MEDICINE

## 2023-08-22 PROCEDURE — 97140 MANUAL THERAPY 1/> REGIONS: CPT

## 2023-08-22 PROCEDURE — 93018 CV STRESS TEST I&R ONLY: CPT | Mod: ,,, | Performed by: INTERNAL MEDICINE

## 2023-08-22 RX ORDER — REGADENOSON 0.08 MG/ML
0.4 INJECTION, SOLUTION INTRAVENOUS ONCE
Status: COMPLETED | OUTPATIENT
Start: 2023-08-22 | End: 2023-08-22

## 2023-08-22 RX ORDER — AMINOPHYLLINE 25 MG/ML
75 INJECTION, SOLUTION INTRAVENOUS ONCE
Status: COMPLETED | OUTPATIENT
Start: 2023-08-22 | End: 2023-08-22

## 2023-08-22 RX ADMIN — REGADENOSON 0.4 MG: 0.08 INJECTION, SOLUTION INTRAVENOUS at 08:08

## 2023-08-22 RX ADMIN — AMINOPHYLLINE 75 MG: 25 INJECTION, SOLUTION INTRAVENOUS at 08:08

## 2023-08-22 NOTE — PROGRESS NOTES
[facility] OUTPATIENT THERAPY AND WELLNESS  Occupational Therapy Treatment Note  Lymphedema    Date: 8/22/2023  Name: Chanelle Downey  Clinic Number: [id number]    Therapy Diagnosis:   Encounter Diagnosis   Name Primary?    Lymphedema of both lower extremities Yes         Physician: Luis Eduardo Eckert MD*    Physician Orders: Eval and Treat  Medical Diagnosis: R60.0 (ICD-10-CM) - Edema of both lower legs  Evaluation Date: 7/18/2023  Insurance Authorization Period Expiration: 4/19/2024  Plan of Care Certification Period: 10/11/2023  Visit # / Visits authorized: 7 / 20  FOTO: see media, 2 / 3     Precautions:  Standard and Fall    Time In: 2:07  Time Out: 2:55  Total Billable Time: 48 minutes     SUBJECTIVE     Pt reports: RLE more swollen today.   Chanelle reported wearing compression outside of therapy visits: yes, edema wear size small  Response to previous treatment: reduced, non-pitting.   Functional change: none    Pain: 10/10      OBJECTIVE     Pt arrived out of compression, ambulating with cane.     Compression garment status: provided product information for 20-30 mm hg stockings from [employer].   Compression Rx status: not requested  Pneumatic pump status: established with [employer]     LE Girth Measurements: taken in supine  LANDMARK RIGHT LE  7/18 LEFT LE  7/18 RIGHT LE  8/10 LEFT LE  8/10 RIGHT LE  8/22 LEFT LE  8/22   SBP 53.0 cm 52.5 cm - - - -   10 below SBP 47.0 cm 47.5 cm 45.0 cm 47.0 cm 45.0 cm 46.0 cm   20 below SBP 44.5 cm 42.0 cm 45.0 cm 45.0 cm 44.0 cm 43.0 cm   30 below SBP 34.0 cm 32.0 cm 36.0 cm 35.0 cm 39.0 cm 33.5 cm   35 below SBP 28.0 cm 26.5 cm 31.5 cm 28.5 cm 30.5 cm 26.0 cm   Ankle 25.0 cm 24.0 cm 26.0 cm 24.5 cm 27.0 cm 24.5 cm   Forefoot 21.0 cm 21.0 cm 21.0 cm 22.0 cm 21.0 cm 21.0 cm       Treatment     Chanelle received the treatments listed below:       Manual therapy techniques: Complete Decongestive Therapy was applied to the: BLE for 40 minutes, including: manual lymphatic drainage  and short stretch compression bandaging     MANUAL LYMPHATIC DRAINAGE: BLE   Supine with lower extremities elevated:  Deep abdominal techniques  Leg treatment, knee, lower leg, ankle, dorsum of foot  Rework: leg, inguinal lymph nodes, abdominal techniques    MULTILAYERED BANDAGING:    Issued supplies and bandaged BLE via modified technique to accommodate footwear    Use of Cavilon moisturizer for dry skin  Cotton stockinet: size 9  Cellona padding padding from dorsum of foot to knee,   2-8cm and 2-10cm Rosidal K rolls foot to distal knee      Pt to leave intact 48 hrs as tolerated, discontinue with any problems, return rolled bandages next session. Wash and wear schedules confirmed.     Therapeutic exercise was completed for 8 minutes, including:      Ankle pumps 3 x 10  Heel raises 3 x 10  Seated marching 3 x 10    Educated on benefit of completing exercise in compression to maximize lymphatic return.        Patient Education and Home Exercises      Education provided:   - falls prevention   - Progress towards goals     Written Home Exercises Provided: yes.  Exercises were reviewed and Chanelle was able to demonstrate them prior to the end of the session.  Chanelle demonstrated good  understanding of the HEP provided. See EMR under Patient Instructions for exercises provided during therapy sessions.       Assessment     RLE more edematous this session. Provided second layer of edema wear size small to tete to RLE only. Plan to re-assess next session. Revisited benefit of purchasing 20-30 mm hg stockings to maximize containment after reducing with bandages.     Pt would continue to benefit from skilled OT.      Chanelle is progressing well towards her goals and there are no updates to goals at this time. Pt prognosis is Good.     Pt will continue to benefit from skilled outpatient occupational therapy to address the deficits listed in the problem list on initial evaluation provide pt/family education and to maximize pt's level of  independence in the home and community environment.     Pt's spiritual, cultural and educational needs considered and pt agreeable to plan of care and goals.    Anticipated barriers to occupational therapy: none    Goals:    Short Term Goals: (4 weeks)  Goals: Progressing / MET   1. Patient will demonstrate 100% knowledge of lymphedema precautions and signs of infection to allow for reduced lymphedema risk, infection risk, and/or exacerbation of condition.  NOT MET   2. Patient will tolerate daily activities wearing multilayered bandaging to allow for lymphatic drainage support, skin elasticity, and reduction in shape and size of limb.  NOT MET   3. Patient and/or caregiver will order/obtain appropriate compression garments to maintain lymphatic and venous support.  NOT MET   4. Patient will show decreased total girth measurement in BLE by up to 2 cm to allow for lower extremity symmetry, shoe and clothing choice, and ability to apply needed compression. NOT MET      Long Term Goals: (8 weeks)  Goals: Progressing / MET   1. Patient and/or caregiver to tete/doff compression garment independently and with daily compliance to assist in lymphedema management, skin elasticity, and tissue density NOT MET   2. Patient and/or caregiver will be independent in use of pneumatic compression pump or self manual lymphatic drainage techniques to areas within reach to enhance lymphatic drainage and skin condition.  NOT MET   3. Patient to be independent and compliant with home exercise program to allow for increased function in affected limb. NOT MET   4. Patient will show decreased total girth measurement in BLE by up to 4 cm  to allow for lower extremity symmetry, shoe and clothing choice, and ability to apply needed compression daily. NOT MET          PLAN     Updates/Grading for next session: none    Aissatou Roach, OT, CLWT

## 2023-08-23 ENCOUNTER — OFFICE VISIT (OUTPATIENT)
Dept: PODIATRY | Facility: CLINIC | Age: 63
End: 2023-08-23
Payer: COMMERCIAL

## 2023-08-23 ENCOUNTER — APPOINTMENT (OUTPATIENT)
Dept: RADIOLOGY | Facility: OTHER | Age: 63
End: 2023-08-23
Attending: PODIATRIST
Payer: COMMERCIAL

## 2023-08-23 VITALS
HEIGHT: 60 IN | BODY MASS INDEX: 41.23 KG/M2 | HEART RATE: 82 BPM | SYSTOLIC BLOOD PRESSURE: 133 MMHG | WEIGHT: 210 LBS | DIASTOLIC BLOOD PRESSURE: 74 MMHG
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
## 2023-08-23 DIAGNOSIS — M79.672 FOOT PAIN, BILATERAL: ICD-10-CM

## 2023-08-23 DIAGNOSIS — M79.671 FOOT PAIN, BILATERAL: ICD-10-CM

## 2023-08-23 DIAGNOSIS — G89.29 OTHER CHRONIC PAIN: ICD-10-CM

## 2023-08-23 DIAGNOSIS — B35.1 ONYCHOMYCOSIS DUE TO DERMATOPHYTE: ICD-10-CM

## 2023-08-23 DIAGNOSIS — M21.6X2 ACQUIRED METATARSUS ADDUCTUS OF LEFT FOOT: ICD-10-CM

## 2023-08-23 DIAGNOSIS — B35.1 ONYCHOMYCOSIS DUE TO DERMATOPHYTE: Primary | ICD-10-CM

## 2023-08-23 PROCEDURE — 3044F HG A1C LEVEL LT 7.0%: CPT | Mod: CPTII,S$GLB,, | Performed by: PODIATRIST

## 2023-08-23 PROCEDURE — 3078F PR MOST RECENT DIASTOLIC BLOOD PRESSURE < 80 MM HG: ICD-10-PCS | Mod: CPTII,S$GLB,, | Performed by: PODIATRIST

## 2023-08-23 PROCEDURE — 3008F PR BODY MASS INDEX (BMI) DOCUMENTED: ICD-10-PCS | Mod: CPTII,S$GLB,, | Performed by: PODIATRIST

## 2023-08-23 PROCEDURE — 1160F PR REVIEW ALL MEDS BY PRESCRIBER/CLIN PHARMACIST DOCUMENTED: ICD-10-PCS | Mod: CPTII,S$GLB,, | Performed by: PODIATRIST

## 2023-08-23 PROCEDURE — 1159F MED LIST DOCD IN RCRD: CPT | Mod: CPTII,S$GLB,, | Performed by: PODIATRIST

## 2023-08-23 PROCEDURE — 73630 X-RAY EXAM OF FOOT: CPT | Mod: 26,LT,, | Performed by: RADIOLOGY

## 2023-08-23 PROCEDURE — 3078F DIAST BP <80 MM HG: CPT | Mod: CPTII,S$GLB,, | Performed by: PODIATRIST

## 2023-08-23 PROCEDURE — 73630 XR FOOT COMPLETE 3 VIEW LEFT: ICD-10-PCS | Mod: 26,LT,, | Performed by: RADIOLOGY

## 2023-08-23 PROCEDURE — 3044F PR MOST RECENT HEMOGLOBIN A1C LEVEL <7.0%: ICD-10-PCS | Mod: CPTII,S$GLB,, | Performed by: PODIATRIST

## 2023-08-23 PROCEDURE — 73630 X-RAY EXAM OF FOOT: CPT | Mod: TC,LT

## 2023-08-23 PROCEDURE — 3075F SYST BP GE 130 - 139MM HG: CPT | Mod: CPTII,S$GLB,, | Performed by: PODIATRIST

## 2023-08-23 PROCEDURE — 1159F PR MEDICATION LIST DOCUMENTED IN MEDICAL RECORD: ICD-10-PCS | Mod: CPTII,S$GLB,, | Performed by: PODIATRIST

## 2023-08-23 PROCEDURE — 99204 OFFICE O/P NEW MOD 45 MIN: CPT | Mod: S$GLB,,, | Performed by: PODIATRIST

## 2023-08-23 PROCEDURE — 3075F PR MOST RECENT SYSTOLIC BLOOD PRESS GE 130-139MM HG: ICD-10-PCS | Mod: CPTII,S$GLB,, | Performed by: PODIATRIST

## 2023-08-23 PROCEDURE — 99999 PR PBB SHADOW E&M-EST. PATIENT-LVL V: ICD-10-PCS | Mod: PBBFAC,,, | Performed by: PODIATRIST

## 2023-08-23 PROCEDURE — 99999 PR PBB SHADOW E&M-EST. PATIENT-LVL V: CPT | Mod: PBBFAC,,, | Performed by: PODIATRIST

## 2023-08-23 PROCEDURE — 3008F BODY MASS INDEX DOCD: CPT | Mod: CPTII,S$GLB,, | Performed by: PODIATRIST

## 2023-08-23 PROCEDURE — 1160F RVW MEDS BY RX/DR IN RCRD: CPT | Mod: CPTII,S$GLB,, | Performed by: PODIATRIST

## 2023-08-23 PROCEDURE — 99204 PR OFFICE/OUTPT VISIT, NEW, LEVL IV, 45-59 MIN: ICD-10-PCS | Mod: S$GLB,,, | Performed by: PODIATRIST

## 2023-08-23 RX ORDER — CICLOPIROX 80 MG/ML
SOLUTION TOPICAL NIGHTLY
Qty: 6.6 ML | Refills: 11 | Status: SHIPPED | OUTPATIENT
Start: 2023-08-23

## 2023-08-23 NOTE — PROGRESS NOTES
Subjective:      Patient ID: Chanelle Downey is a 63 y.o. female.    Chief Complaint: Foot Problem (Pain in left foot/Toenail fungus/Nail care)    Thickened elongated toenails which are different called to care for.  Gradual onset, worsening over the past several months.  Aggravated by socks shoes pressure ambulation.  No prior medical treatment.  No self-treatment.  Patient denies trauma surgery of the feet and toenails.      Cc2 prominent outside midfoot bone left foot.  Gradual onset worsening over the past several years since a spinal surgery.  Aggravated by some socks shoes pressure and ambulation.  No prior medical treatment.  No self-treatment.  Denies trauma and surgery left foot.      Cc3  chronic neuropathic pain both feet radiating back pain left lower extremity and falls.  Gradual onset, worsening since her spinal surgery.  Aggravated with position and ambulation.  No medical treatment since her surgery.    Review of Systems   Constitutional: Negative for chills, diaphoresis, fever, malaise/fatigue and night sweats.   Cardiovascular:  Negative for claudication, cyanosis, leg swelling and syncope.   Skin:  Positive for nail changes. Negative for color change, dry skin, rash, suspicious lesions and unusual hair distribution.   Musculoskeletal:  Negative for falls, joint pain, joint swelling, muscle cramps, muscle weakness and stiffness.   Gastrointestinal:  Negative for constipation, diarrhea, nausea and vomiting.   Neurological:  Positive for numbness, paresthesias and sensory change. Negative for brief paralysis, disturbances in coordination, focal weakness and tremors.         Objective:      Physical Exam  Constitutional:       General: She is not in acute distress.     Appearance: She is well-developed. She is not diaphoretic.   Cardiovascular:      Pulses:           Popliteal pulses are 2+ on the right side and 2+ on the left side.        Dorsalis pedis pulses are 2+ on the right side and 2+ on  the left side.        Posterior tibial pulses are 2+ on the right side and 2+ on the left side.      Comments: Capillary refill 3 seconds all toes/distal feet, all toes/both feet warm to touch.      Negative lymphadenopathy bilateral popliteal fossa and tarsal tunnel.      Negavie lower extremity edema bilateral.    Musculoskeletal:      Right ankle: No swelling, deformity, ecchymosis or lacerations. Normal range of motion. Normal pulse.      Right Achilles Tendon: Normal. No defects. Gordon's test negative.      Comments: C-shaped lateral border left foot with visible adductus of the front part of the foot compared to the hindfoot without pain to palpation, loss of function, signs of acute trauma.         Lymphadenopathy:      Lower Body: No right inguinal adenopathy. No left inguinal adenopathy.      Comments: Negative lymphadenopathy bilateral popliteal fossa and tarsal tunnel.    Negative lymphangitic streaking bilateral feet/ankles/legs.   Skin:     General: Skin is warm and dry.      Capillary Refill: Capillary refill takes 2 to 3 seconds.      Coloration: Skin is not pale.      Findings: No abrasion, bruising, burn, ecchymosis, erythema, laceration, lesion or rash.      Nails: There is no clubbing.      Comments: Skin is normal age and health appropriate color, turgor, texture, and temperature bilateral lower extremities without ulceration, hyperpigmentation, discoloration, masses nodules or cords palpated.  No ecchymosis, erythema, edema, or cardinal signs of infection bilateral lower extremities.    Toenails 1st, 2nd, 3rd, 4th, 5th  bilateral are hypertrophic thickened 2-3 mm, dystrophic, discolored tanish brown with tan, gray crumbly subungual debris.  Tender to distal nail plate pressure, without periungual skin abnormality of each.       Neurological:      Mental Status: She is alert and oriented to person, place, and time.      Sensory: Sensory deficit present.      Motor: No tremor, atrophy or  abnormal muscle tone.      Gait: Gait normal.      Comments: Diminished/loss of protective sensation all toes bilateral to 10 gram monofilament.    Paresthesias, and burning bilateral feet with no clearly identified trigger or source.    Negative allodynia both feet   Psychiatric:         Behavior: Behavior is cooperative.           Assessment:       Encounter Diagnoses   Name Primary?    Foot pain, bilateral     Onychomycosis due to dermatophyte Yes    Acquired metatarsus adductus of left foot     Fall, subsequent encounter     Other chronic pain          Plan:       Chanelle was seen today for foot problem.    Diagnoses and all orders for this visit:    Onychomycosis due to dermatophyte  -     ORTHOTIC DEVICE (DME)  -     X-Ray Foot Complete Left; Future  -     X-Ray Ankle Complete Left; Future    Foot pain, bilateral  -     Ambulatory referral/consult to Podiatry  -     ORTHOTIC DEVICE (DME)  -     X-Ray Foot Complete Left; Future  -     X-Ray Ankle Complete Left; Future    Acquired metatarsus adductus of left foot  -     ORTHOTIC DEVICE (DME)  -     X-Ray Foot Complete Left; Future  -     X-Ray Ankle Complete Left; Future    Fall, subsequent encounter  -     Ambulatory referral/consult to Physical Medicine Rehab; Future  -     ORTHOTIC DEVICE (DME)  -     X-Ray Foot Complete Left; Future  -     X-Ray Ankle Complete Left; Future    Other chronic pain  -     Ambulatory referral/consult to Physical Medicine Rehab; Future  -     ORTHOTIC DEVICE (DME)  -     X-Ray Foot Complete Left; Future  -     X-Ray Ankle Complete Left; Future    Other orders  -     ciclopirox (PENLAC) 8 % Soln; Apply topically nightly.      I counseled the patient on her conditions, their implications and medical management.        Penlac topically once daily for nail fungus.      X-rays left foot and ankle.      Continue activity to tolerance and shoes of choice.      Custom orthotics left foot to maintain best alignment possible.      Consult  Physical Medicine rehab-chronic back pain, chronic neuropathic pain, falls.      Follow up next few days for non-covered foot care (toenails)          No follow-ups on file.

## 2023-08-29 ENCOUNTER — TELEPHONE (OUTPATIENT)
Dept: NEUROSURGERY | Facility: CLINIC | Age: 63
End: 2023-08-29
Payer: COMMERCIAL

## 2023-08-29 DIAGNOSIS — M54.9 BACK PAIN, UNSPECIFIED BACK LOCATION, UNSPECIFIED BACK PAIN LATERALITY, UNSPECIFIED CHRONICITY: Primary | ICD-10-CM

## 2023-08-31 ENCOUNTER — CLINICAL SUPPORT (OUTPATIENT)
Dept: REHABILITATION | Facility: HOSPITAL | Age: 63
End: 2023-08-31
Payer: COMMERCIAL

## 2023-08-31 DIAGNOSIS — I89.0 LYMPHEDEMA OF BOTH LOWER EXTREMITIES: Primary | ICD-10-CM

## 2023-08-31 PROCEDURE — 97140 MANUAL THERAPY 1/> REGIONS: CPT

## 2023-08-31 NOTE — PROGRESS NOTES
[facility]  Occupational Therapy Discharge Note  Lymphedema    Date: 8/31/2023  Name: Chanelle Downey  Clinic Number: [id number]    Therapy Diagnosis:   Encounter Diagnosis   Name Primary?    Lymphedema of both lower extremities Yes         Physician: Luis Eduardo Eckert MD*    Physician Orders: Eval and Treat  Medical Diagnosis: R60.0 (ICD-10-CM) - Edema of both lower legs  Evaluation Date: 7/18/2023  Insurance Authorization Period Expiration: 4/19/2024  Plan of Care Certification Period: 10/11/2023  Visit # / Visits authorized: 8 / 20  FOTO: see media, 2 / 3     Precautions:  Standard and Fall    Time In: 2:40  Time Out: 3:25  Total Billable Time: 45 minutes (late arrival)    SUBJECTIVE     Pt reports: will purchase 20-30 mm hg stockings. Pump delivered but she has not opened the box yet. Amenable to discharge.   Chanelle reported wearing compression outside of therapy visits: yes, edema wear size small  Response to previous treatment: reduced, non-pitting.   Functional change: none    Pain: 10/10      OBJECTIVE     Pt arrived out of compression, ambulating with cane.     Compression garment status: provided product information for 20-30 mm hg stockings from [employer].   Compression Rx status: not requested  Pneumatic pump status: delivered by [employer]     LE Girth Measurements: taken in supine  LANDMARK RIGHT LE  7/18 LEFT LE  7/18 RIGHT LE  8/10 LEFT LE  8/10 RIGHT LE  8/22 LEFT LE  8/22 RIGHT LE  8/31 LEFT LE  8/31   SBP 53.0 cm 52.5 cm - - - - - -   10 below SBP 47.0 cm 47.5 cm 45.0 cm 47.0 cm 45.0 cm 46.0 cm 45.0 cm 46.0 cm   20 below SBP 44.5 cm 42.0 cm 45.0 cm 45.0 cm 44.0 cm 43.0 cm 44.5 cm 43.5 cm   30 below SBP 34.0 cm 32.0 cm 36.0 cm 35.0 cm 39.0 cm 33.5 cm 34.5 cm 34.5 cm   35 below SBP 28.0 cm 26.5 cm 31.5 cm 28.5 cm 30.5 cm 26.0 cm 28.5 cm 26.5 cm   Ankle 25.0 cm 24.0 cm 26.0 cm 24.5 cm 27.0 cm 24.5 cm 27.0 cm 24.5 cm   Forefoot 21.0 cm 21.0 cm 21.0 cm 22.0 cm 21.0 cm  21.0 cm 20.5 cm 21.5 cm             Treatment     Chanelle received the treatments listed below:       Manual therapy techniques: Complete Decongestive Therapy was applied to the: BLE for 45 minutes, including: manual lymphatic drainage and short stretch compression bandaging     MANUAL LYMPHATIC DRAINAGE: BLE   Supine with lower extremities elevated:  Deep abdominal techniques  Leg treatment, knee, lower leg, ankle, dorsum of foot  Rework: leg, inguinal lymph nodes, abdominal techniques    Edema wear donned to BLE at end of session.    Educated pt on home management plan- 20-30 mm hg stockings daily, remove before bed, use pump as able, continue HEP daily.  Indications for new referral.  No further benefit of complete decongestive therapy.         Patient Education and Home Exercises      Education provided:   - falls prevention   - Progress towards goals     Written Home Exercises Provided: yes.  Exercises were reviewed and Chanelle was able to demonstrate them prior to the end of the session.  Chanelle demonstrated good  understanding of the HEP provided. See EMR under Patient Instructions for exercises provided during therapy sessions.       Assessment     BLE no longer reducing and have reached a plateau. Pt is ready for discharge to home management. All questions and concerns addressed.    Anticipated barriers to occupational therapy: none    Goals:    Short Term Goals: (4 weeks)  Goals: Progressing / MET   1. Patient will demonstrate 100% knowledge of lymphedema precautions and signs of infection to allow for reduced lymphedema risk, infection risk, and/or exacerbation of condition.  MET   2. Patient will tolerate daily activities wearing multilayered bandaging to allow for lymphatic drainage support, skin elasticity, and reduction in shape and size of limb.  MET   3. Patient and/or caregiver will order/obtain appropriate compression garments to maintain lymphatic and venous support.  NOT MET   4. Patient will show  decreased total girth measurement in BLE by up to 2 cm to allow for lower extremity symmetry, shoe and clothing choice, and ability to apply needed compression. NOT MET      Long Term Goals: (8 weeks)  Goals: Progressing / MET   1. Patient and/or caregiver to tete/doff compression garment independently and with daily compliance to assist in lymphedema management, skin elasticity, and tissue density MET   2. Patient and/or caregiver will be independent in use of pneumatic compression pump or self manual lymphatic drainage techniques to areas within reach to enhance lymphatic drainage and skin condition.  NOT MET   3. Patient to be independent and compliant with home exercise program to allow for increased function in affected limb. MET   4. Patient will show decreased total girth measurement in BLE by up to 4 cm  to allow for lower extremity symmetry, shoe and clothing choice, and ability to apply needed compression daily. NOT MET          PLAN     Discharge.     Aissatou Roach, OT, CLWT

## 2023-09-08 ENCOUNTER — TELEPHONE (OUTPATIENT)
Dept: CARDIOLOGY | Facility: CLINIC | Age: 63
End: 2023-09-08
Payer: COMMERCIAL

## 2023-09-08 NOTE — TELEPHONE ENCOUNTER
Lymphedema Pump Progress:  [x] Order, clinical notes, and face sheet faxed to [employer] for home pneumatic compression.  [x] Reached out to patient for follow up. Wish to inquire about symptoms of lymphedema and if conservative therapy has been working.  [x] Updated progress note sent to [facility].  [x] Patient pneumatic compression pump approved and shipped by [employer].

## 2023-10-06 ENCOUNTER — CLINICAL SUPPORT (OUTPATIENT)
Dept: CARDIOLOGY | Facility: HOSPITAL | Age: 63
End: 2023-10-06
Attending: STUDENT IN AN ORGANIZED HEALTH CARE EDUCATION/TRAINING PROGRAM
Payer: COMMERCIAL

## 2023-10-06 DIAGNOSIS — R00.2 PALPITATIONS: ICD-10-CM

## 2023-11-22 ENCOUNTER — DOCUMENTATION ONLY (OUTPATIENT)
Dept: CARDIOLOGY | Facility: HOSPITAL | Age: 63
End: 2023-11-22
Payer: COMMERCIAL

## 2023-11-22 NOTE — PROGRESS NOTES
Per [employer], patient received 30 day event monitor by mail; however, did not proceed with testing. Study cancelled due to patient compliance.

## 2023-12-27 NOTE — PROGRESS NOTES
Name: Chanelle Downey  MRN: [id number]   CSN: [id number]      Date: 12/28/2023    Referring physician:  Quinton Miller MD  [address] [place] [address]    Chief Complaint: imbalance     History of Present Illness (HPI): Chanelle Downey is a R handed 63 y.o. female with a medical issues significant for cervical radiculopathy, chronic pain, gerd, depression, hld, htn who presents for imbalance. States that she is here for her spine issues. States that she also has pain, numbness, tingling in her arms and legs. Had a spinal fusion in the past of the lumbar spine (2010) and since then she hasn't been able to walk normal. Thought that she was being seen for these issues today. Also states that she cannot  place without losing her balance. She has fallen 3-4 times in the past 6 months, walking and she falls. One time she was taking a step up and her foot did not go high enough. Almost falls when turning from one direction to another. This has been ongoing for years. Not worse but has not improved. Does not know whether she shuffles her feet. She has walked with a cane since 2010. Some urinary hesitancy, denies bowel issues. Some dysphagia with solids, feels like her throat dries up. Denies choking episodes. Numbness and tingling in both hands, all digits. States that her L side is her weak side. Goes to a methadone clinic outside of [facility] for pain management. Leans on cart at work, works as LPN.     Loses her balance in the shower even with her eyes open.     Does not supplement with vitamins currently.     Mother has imbalance, not sure what the etiology is. Uses a walker, age 80. Lives in [place].     Last lumbar MRI 2018  Prior L4-S1 instrumented lumbar fusion with posterior laminectomies at L4 and L5.  Partial osseous fusion at L3-4 noted.     Lumbar spondylosis, most significant at L2-L3, contributing to moderate spinal canal stenosis and mild bilateral neural foraminal narrowing.      Prominent L2-3 facet arthropathy with grade 1 anterolisthesis.        From 2018 with Dr. Hamilton  Patient is a 57 y.o. female with PMHx of GERD and chronic LBP s/p lumbar discectomy 2002 and L4-S1 laminectomy and fusion 2010 with post-procedural flat back syndrome who presents to [facility] 1/17/2018 for concern of progression of BLE symptoms and wondering whether she needs surgical intervention yet.  Patient had seen Dr. Caballero 10/2016 and was under the impression that we were affiliated with his clinic due to confusion with Dr. Yap, who checked EMG on this patient 02/20/17.  Patient presents today with progression of BLE weakness and impaired gait over past ~ 10 years with increased falls over past 5 years.  Notes ~ 10 falls within the past year alone. She is unable to stand straight and walks with a cane, requiring a 45 degree front bend at the waist at all times while walking.  Cites numbness/tingling down entire LLE with associated L foot deformity.  Pain in R lower back with radiation to RLE.  Additional joint pain, possibly due to gait abnormalities, in R hip and L knee.  Occasional urge incontinence but no significant bowel/bladder incontinence or saddle anesthesia.  Has noticed some intermittent numbness/tingling in BUE developing over past year or so, R worse than L.  She still has full strength in BUE.  Has tried PT/OT intermittently over past 10+ years, but notes no recent therapy.  Very interested in working on losing weight and trying therapy.  She is also getting in with smoking cessation clinic next week.  Her pain is relatively well-controlled on gabapentin 300 mg qd (works during the night and takes when she gets home), duloxetine 90 mg po qd, methadone 110 mg po qd, and ibuprofen 800 mg po qd.  Occasionally takes up to 1600 mg ibuprofen daily, counseled on risk to stomach lining and kidneys.       Review of Systems:   Review of Systems   Constitutional:  Negative for chills, fever and  malaise/fatigue.   HENT:  Negative for hearing loss.    Eyes:  Negative for blurred vision and double vision.   Respiratory:  Negative for cough, shortness of breath and stridor.    Cardiovascular:  Negative for chest pain and leg swelling.   Gastrointestinal:  Negative for constipation, diarrhea and nausea.   Genitourinary:  Positive for dysuria. Negative for frequency and urgency.   Musculoskeletal:  Positive for back pain and falls.   Skin:  Negative for itching and rash.   Neurological:  Positive for tingling and sensory change. Negative for dizziness, loss of consciousness and weakness.   Psychiatric/Behavioral:  Negative for hallucinations and memory loss.          Past Medical History: The patient  has a past medical history of Back pain, chronic, Biliary colic (6/11/2018), Calculus of gallbladder without cholecystitis without obstruction (5/14/2018), Cervical radiculopathy (3/21/2016), Cervical stenosis of spinal canal (3/21/2016), Degenerative cervical spinal stenosis (2/10/2016), Depression, Flat back syndrome, postprocedural (1/17/2018), and HSV (herpes simplex virus) anogenital infection (9/16/2016).    Social History: The patient  reports that she has been smoking cigarettes. She has a 20.0 pack-year smoking history. She has never used smokeless tobacco. She reports that she does not currently use alcohol. She reports that she does not use drugs.    Family History: Their family history includes Alcohol abuse in her mother; Arthritis in her mother; Birth defects in her daughter; Cancer in her mother; Depression in her mother; Heart disease in her mother; Hypertension in her mother; Mental illness in her daughter.    Allergies: Patient has no known allergies.     Meds:   Current Outpatient Medications on File Prior to Visit   Medication Sig Dispense Refill    aspirin 81 mg Cap Take 81 mg by mouth Daily.      atorvastatin (LIPITOR) 40 MG tablet Take 1 tablet (40 mg total) by mouth once daily. 90 tablet 3  "   bumetanide (BUMEX) 0.5 MG Tab Take 1 tablet (0.5 mg total) by mouth once daily. 90 tablet 3    ciclopirox (PENLAC) 8 % Soln Apply topically nightly. 6.6 mL 11    DULoxetine (CYMBALTA) 60 MG capsule Take 1 capsule (60 mg total) by mouth 2 (two) times daily. 180 capsule 3    ergocalciferol, vitamin D2, (VITAMIN D ORAL) Take 1 capsule by mouth Daily.      hydroCHLOROthiazide (HYDRODIURIL) 12.5 MG Tab Take 1 tablet (12.5 mg total) by mouth once daily. 90 tablet 3    meloxicam (MOBIC) 15 MG tablet Take 1 tablet (15 mg total) by mouth daily as needed for Pain. 30 tablet 1    methadone HCl (METHADONE ORAL) Take 85 mg by mouth once daily.      omeprazole (PRILOSEC) 40 MG capsule Take 1 capsule (40 mg total) by mouth once daily. 90 capsule 3    Ubidecar/Fish Oil/Omega-3/Jude (CO Q10-FISH OIL-OMEGA 3-E ORAL) Take 1 capsule by mouth Daily.      valACYclovir (VALTREX) 500 MG tablet Take 1 tablet (500 mg total) by mouth once daily. 90 tablet 3    vitamin D (VITAMIN D3) 1000 units Tab Take 1,000 Units by mouth once daily.      ZINC ORAL Take 1 capsule by mouth Daily.       No current facility-administered medications on file prior to visit.       Exam:  /82   Pulse 82   Ht 5' 2" (1.575 m)   Wt 96.6 kg (213 lb)   BMI 38.96 kg/m²     Constitutional  Well-developed, well-nourished, appears stated age   Ophthalmoscopic  No papilledema with no hemorrhages or exudates bilaterally   Cardiovascular  Radial pulses 2+ and symmetric, no LE edema bilaterally   Neurological    * Mental status  MOCA =      - Orientation  Oriented to person, place, time, and situation     - Memory   Intact recent and remote     - Attention/concentration  Attentive, vigilant during exam     - Language  Naming & repetition intact, +2-step commands     - Fund of knowledge  Aware of current events     - Executive  Well-organized thoughts     - Other     * Cranial nerves       - CN II  PERRL, visual fields full to confrontation     - CN III, IV, VI  " Extraocular movements full, normal pursuits and saccades     - CN V  Sensation V1 - V3 intact     - CN VII  Face strong and symmetric bilaterally     - CN VIII  Hearing intact bilaterally     - CN IX, X  Palate raises midline and symmetric     - CN XI  SCM and trapezius 5/5 bilaterally     - CN XII  Tongue midline   * Motor  Muscle bulk normal, strength 4/5 LUE and LLE -- some giveaway weakness    * Sensory   Intact to light touch and vibratory sense    * Coordination  No dysmetria with finger-to-nose or heel-to-shin   * Gait  See below.   * Deep tendon reflexes  2+ and symmetric throughout   Clonus absent    Babinski downgoing bilaterally   * Specialized movement exam  No hypophonic speech.    No facial masking, appropriately animated    No cogwheel rigidity.     No bradykinesia.   No tremor with rest, posture, kinesis, or intention.    No other dystonia, chorea, athetosis, myoclonus, or tics.   No motor impersistence.   Very wide-based gait, walking with cane in R hand    Significant anterior stoop    No shortened stride length.   No abnormal arm swing.       Laboratory/Radiological:  - Results:  No visits with results within 3 Month(s) from this visit.   Latest known visit with results is:   Hospital Outpatient Visit on 08/21/2023   Component Date Value Ref Range Status    85% Max Predicted HR 08/22/2023 129   Final    Max Predicted HR 08/22/2023 152   Final    [facility] CV CPX PATIENT IS MALE 08/22/2023 0.0   Final    [facility] CV CPX PATIENT IS FEMALE 08/22/2023 1.0   Final    Systolic blood pressure 08/22/2023 133  mmHg Final    Diastolic blood pressure 08/22/2023 76  mmHg Final    HR at rest 08/22/2023 75  bpm Final    Peak Systolic BP 08/22/2023 140  mmHg Final    Peak Diatolic BP 08/22/2023 72  mmHg Final    Peak HR 08/22/2023 98  bpm Final    % Max HR Achieved 08/22/2023 64   Final    1 Minute Recovery HR 08/22/2023 100  bpm Final    RPP 08/22/2023 9,975   Final    Peak RPP 08/22/2023 13,720   Final    EF + QEF  08/22/2023 47  % Final    Ejection Fraction- High Stress 08/22/2023 59  % Final    End diastolic index (mL/m2) 08/22/2023 91  mL/m2 Final    End diastolic index (mL/m2) 08/22/2023 155  mL/m2 Final    End systolic index (mL/m2) 08/22/2023 40  mL/m2 Final    End systolic index (mL/m2) 08/22/2023 78  mL/m2 Final    Nuc Rest EF 08/22/2023 55   Final    Nuc Rest Diastolic Volume Index 08/22/2023 98   Final    Nuc Rest Systolic Volume Index 08/22/2023 44   Final    Nuc Stress EF 08/22/2023 51  % Final    Nuc Rest Diastolic Volume Index 08/22/2023 91   Final    Nuc Rest Systolic Volume Index 08/22/2023 44   Final       - Independent review of images:      - Independent review of consultant's notes: Paul     ASSESSMENT/PLAN:  Imbalance   - longstanding history of lumbar and cervical radiculopathy   - ruling out other causes of imbalance as below   - gait appears consistent with lumbar stenosis/disc disease  - weakness in the LLE with some giveaway   - rec'd PT  - discussed referral to ortho spine  - rec'd PMR referral       2. Cervical radiculopathy/ lumbar radiculopathy  - her primary complaint today is pain, numbness and tingling that radiates down her arms and legs   - known history of cervical radiculopathy and lumbar radiculopathy s/p lumbar fusion  - follows with methadone clinic for pain management   - rec'd PT        Orders Placed This Encounter    MRI Cervical Spine Without Contrast    MRI Lumbar Spine Without Contrast    Vitamin B1    Vitamin B12    COPPER, SERUM    ZINC    RPR    Ambulatory referral/consult to Physical Medicine Rehab    Ambulatory referral/consult to Physical/Occupational Therapy         Collaborating Physician, Dr. Epps, was available during today's encounter. Any change to plan along with cosign to appear in the EMR.            Rachel Rhinehart, PA-C [facility]  Department of Neurology  Movement Disorders

## 2023-12-28 ENCOUNTER — LAB VISIT (OUTPATIENT)
Dept: LAB | Facility: HOSPITAL | Age: 63
End: 2023-12-28
Payer: COMMERCIAL

## 2023-12-28 ENCOUNTER — OFFICE VISIT (OUTPATIENT)
Dept: NEUROLOGY | Facility: CLINIC | Age: 63
End: 2023-12-28
Payer: COMMERCIAL

## 2023-12-28 VITALS
BODY MASS INDEX: 39.2 KG/M2 | SYSTOLIC BLOOD PRESSURE: 134 MMHG | WEIGHT: 213 LBS | HEART RATE: 82 BPM | DIASTOLIC BLOOD PRESSURE: 82 MMHG | HEIGHT: 62 IN

## 2023-12-28 DIAGNOSIS — R29.6 FREQUENT FALLS: ICD-10-CM

## 2023-12-28 DIAGNOSIS — M48.02 DEGENERATIVE CERVICAL SPINAL STENOSIS: ICD-10-CM

## 2023-12-28 DIAGNOSIS — Z71.89 COUNSELING REGARDING GOALS OF CARE: ICD-10-CM

## 2023-12-28 DIAGNOSIS — F11.20 METHADONE DEPENDENCE: ICD-10-CM

## 2023-12-28 DIAGNOSIS — R26.89 IMBALANCE: Primary | ICD-10-CM

## 2023-12-28 DIAGNOSIS — R26.89 IMBALANCE: ICD-10-CM

## 2023-12-28 DIAGNOSIS — M54.12 CERVICAL RADICULOPATHY: ICD-10-CM

## 2023-12-28 DIAGNOSIS — M54.16 LUMBAR RADICULOPATHY, CHRONIC: ICD-10-CM

## 2023-12-28 DIAGNOSIS — G89.4 CHRONIC PAIN SYNDROME: ICD-10-CM

## 2023-12-28 DIAGNOSIS — M48.02 CERVICAL STENOSIS OF SPINAL CANAL: ICD-10-CM

## 2023-12-28 DIAGNOSIS — R26.81 GAIT INSTABILITY: ICD-10-CM

## 2023-12-28 DIAGNOSIS — M48.02 SPINAL STENOSIS, CERVICAL REGION: ICD-10-CM

## 2023-12-28 LAB — VIT B12 SERPL-MCNC: 610 PG/ML (ref 210–950)

## 2023-12-28 PROCEDURE — 86592 SYPHILIS TEST NON-TREP QUAL: CPT | Performed by: PHYSICIAN ASSISTANT

## 2023-12-28 PROCEDURE — 1159F MED LIST DOCD IN RCRD: CPT | Mod: CPTII,S$GLB,, | Performed by: PHYSICIAN ASSISTANT

## 2023-12-28 PROCEDURE — 84630 ASSAY OF ZINC: CPT | Performed by: PHYSICIAN ASSISTANT

## 2023-12-28 PROCEDURE — 1160F PR REVIEW ALL MEDS BY PRESCRIBER/CLIN PHARMACIST DOCUMENTED: ICD-10-PCS | Mod: CPTII,S$GLB,, | Performed by: PHYSICIAN ASSISTANT

## 2023-12-28 PROCEDURE — 3008F PR BODY MASS INDEX (BMI) DOCUMENTED: ICD-10-PCS | Mod: CPTII,S$GLB,, | Performed by: PHYSICIAN ASSISTANT

## 2023-12-28 PROCEDURE — 82525 ASSAY OF COPPER: CPT | Performed by: PHYSICIAN ASSISTANT

## 2023-12-28 PROCEDURE — 99204 OFFICE O/P NEW MOD 45 MIN: CPT | Mod: S$GLB,,, | Performed by: PHYSICIAN ASSISTANT

## 2023-12-28 PROCEDURE — 99999 PR PBB SHADOW E&M-EST. PATIENT-LVL V: ICD-10-PCS | Mod: PBBFAC,,, | Performed by: PHYSICIAN ASSISTANT

## 2023-12-28 PROCEDURE — 84425 ASSAY OF VITAMIN B-1: CPT | Performed by: PHYSICIAN ASSISTANT

## 2023-12-28 PROCEDURE — 82607 VITAMIN B-12: CPT | Performed by: PHYSICIAN ASSISTANT

## 2023-12-28 PROCEDURE — 3008F BODY MASS INDEX DOCD: CPT | Mod: CPTII,S$GLB,, | Performed by: PHYSICIAN ASSISTANT

## 2023-12-28 PROCEDURE — 99999 PR PBB SHADOW E&M-EST. PATIENT-LVL V: CPT | Mod: PBBFAC,,, | Performed by: PHYSICIAN ASSISTANT

## 2023-12-28 PROCEDURE — 1159F PR MEDICATION LIST DOCUMENTED IN MEDICAL RECORD: ICD-10-PCS | Mod: CPTII,S$GLB,, | Performed by: PHYSICIAN ASSISTANT

## 2023-12-28 PROCEDURE — 99204 PR OFFICE/OUTPT VISIT, NEW, LEVL IV, 45-59 MIN: ICD-10-PCS | Mod: S$GLB,,, | Performed by: PHYSICIAN ASSISTANT

## 2023-12-28 PROCEDURE — 3079F PR MOST RECENT DIASTOLIC BLOOD PRESSURE 80-89 MM HG: ICD-10-PCS | Mod: CPTII,S$GLB,, | Performed by: PHYSICIAN ASSISTANT

## 2023-12-28 PROCEDURE — 3079F DIAST BP 80-89 MM HG: CPT | Mod: CPTII,S$GLB,, | Performed by: PHYSICIAN ASSISTANT

## 2023-12-28 PROCEDURE — 36415 COLL VENOUS BLD VENIPUNCTURE: CPT | Performed by: PHYSICIAN ASSISTANT

## 2023-12-28 PROCEDURE — 3075F PR MOST RECENT SYSTOLIC BLOOD PRESS GE 130-139MM HG: ICD-10-PCS | Mod: CPTII,S$GLB,, | Performed by: PHYSICIAN ASSISTANT

## 2023-12-28 PROCEDURE — 3044F PR MOST RECENT HEMOGLOBIN A1C LEVEL <7.0%: ICD-10-PCS | Mod: CPTII,S$GLB,, | Performed by: PHYSICIAN ASSISTANT

## 2023-12-28 PROCEDURE — 3044F HG A1C LEVEL LT 7.0%: CPT | Mod: CPTII,S$GLB,, | Performed by: PHYSICIAN ASSISTANT

## 2023-12-28 PROCEDURE — 3075F SYST BP GE 130 - 139MM HG: CPT | Mod: CPTII,S$GLB,, | Performed by: PHYSICIAN ASSISTANT

## 2023-12-28 PROCEDURE — 1160F RVW MEDS BY RX/DR IN RCRD: CPT | Mod: CPTII,S$GLB,, | Performed by: PHYSICIAN ASSISTANT

## 2023-12-29 LAB — RPR SER QL: NORMAL

## 2024-01-02 LAB — ZINC SERPL-MCNC: 70 UG/DL (ref 60–130)

## 2024-01-03 ENCOUNTER — DOCUMENTATION ONLY (OUTPATIENT)
Dept: REHABILITATION | Facility: HOSPITAL | Age: 64
End: 2024-01-03

## 2024-01-03 LAB
COPPER SERPL-MCNC: 1347 UG/L (ref 810–1990)
VIT B1 BLD-MCNC: 60 UG/L (ref 38–122)

## 2024-01-03 NOTE — PROGRESS NOTES
Missed Visit/Cancellation      Date: 1/3/2024         Canceled Number: 0  No Show Number: 1                                                                                                                Pt initially had visit scheduled for today for 0930.   Reason for cancellation: no show.    Physical therapy evaluation has not been rescheduled at this time    Jo Velazquez, PT, DPT  1/3/2024

## 2024-01-08 ENCOUNTER — PATIENT MESSAGE (OUTPATIENT)
Dept: ADMINISTRATIVE | Facility: OTHER | Age: 64
End: 2024-01-08
Payer: COMMERCIAL

## 2024-01-10 DIAGNOSIS — G89.4 CHRONIC PAIN SYNDROME: ICD-10-CM

## 2024-01-10 DIAGNOSIS — M48.02 DEGENERATIVE CERVICAL SPINAL STENOSIS: ICD-10-CM

## 2024-01-10 DIAGNOSIS — F32.A DEPRESSION, UNSPECIFIED DEPRESSION TYPE: ICD-10-CM

## 2024-01-10 RX ORDER — DULOXETIN HYDROCHLORIDE 60 MG/1
60 CAPSULE, DELAYED RELEASE ORAL 2 TIMES DAILY
Qty: 180 CAPSULE | Refills: 1 | Status: SHIPPED | OUTPATIENT
Start: 2024-01-10

## 2024-01-10 NOTE — TELEPHONE ENCOUNTER
Refill Routing Note   Medication(s) are not appropriate for processing by [facility] for the following reason(s):        No active prescription written by provider:     [facility] action(s):  Defer      Medication Therapy Plan:         Appointments  past 12m or future 3m with PCP    Date Provider   Last Visit   7/31/2023 Quinton Miller MD   Next Visit   1/31/2024 Quinton Miller MD   ED visits in past 90 days: 0        Note composed:3:14 PM 01/10/2024

## 2024-01-10 NOTE — TELEPHONE ENCOUNTER
No care due was identified.  [facility] Embedded Care Due Messages. Reference number: [id number].   1/10/2024 11:55:53 AM CST

## 2024-02-05 DIAGNOSIS — K21.9 GASTROESOPHAGEAL REFLUX DISEASE WITHOUT ESOPHAGITIS: ICD-10-CM

## 2024-02-05 RX ORDER — OMEPRAZOLE 40 MG/1
CAPSULE, DELAYED RELEASE ORAL
Qty: 90 CAPSULE | Refills: 1 | Status: SHIPPED | OUTPATIENT
Start: 2024-02-05

## 2024-02-05 NOTE — TELEPHONE ENCOUNTER
Refill Routing Note   Medication(s) are not appropriate for processing by [facility] for the following reason(s):        No active prescription written by provider  Responsible provider unclear    [facility] action(s):  Defer     Requires labs : Yes             Appointments  past 12m or future 3m with PCP    Date Provider   Last Visit   7/31/2023 Quinton Miller MD   Next Visit   Visit date not found Quinton Miller MD   ED visits in past 90 days: 0        Note composed:11:56 AM 02/05/2024

## 2024-02-05 NOTE — TELEPHONE ENCOUNTER
Care Due:                  Date            Visit Type   Department     Provider  --------------------------------------------------------------------------------                                EP -                              PRIMARY      [facility] INTER[facility]  Last Visit: 07-      CARE ([facility])   MEDICINE       Quinton Miller  Next Visit: None Scheduled  None         None Found                                                            Last  Test          Frequency    Reason                     Performed    Due Date  --------------------------------------------------------------------------------    CMP.........  12 months..  DULoxetine,                04- 04-                             hydroCHLOROthiazide......    [employer] Embedded Care Due Messages. Reference number: [id number].   2/05/2024 11:30:35 AM CST

## 2024-02-06 DIAGNOSIS — M17.12 PRIMARY OSTEOARTHRITIS OF LEFT KNEE: Primary | ICD-10-CM

## 2024-02-21 DIAGNOSIS — Z12.31 OTHER SCREENING MAMMOGRAM: ICD-10-CM

## 2024-02-26 ENCOUNTER — PATIENT MESSAGE (OUTPATIENT)
Dept: ADMINISTRATIVE | Facility: HOSPITAL | Age: 64
End: 2024-02-26
Payer: COMMERCIAL

## 2024-03-19 NOTE — HIM RECORD RETIREMENT NOTE
USP of Incomplete Medical Record    3/19/24    Patient Name: Chanelle Downey  Contact Serial # (CSN): [id number]  Patient Medical Record # (MRN): [id number]  Date of Service: Clinical Support on 2/8/2023  Physician Name: Bonny Barry MD [[id number]     This record has been reviewed and is being retired as incomplete by the approval of the  Medical Staff Operating Committee (MSOC)     On 05/03/2023., due to:  Unavailability of Provider     Missing Information/Comments:  []    Discharge Summary   []    DC Note/Short Stay Summary   []    ED Provider Note   []    Delivery Note   []    History & Physical   []   Operative Note   []     Procedure Note   []     Physician Order   [x]     Verbal Order   []       Other, specify:

## 2024-04-05 ENCOUNTER — NURSE TRIAGE (OUTPATIENT)
Dept: ADMINISTRATIVE | Facility: CLINIC | Age: 64
End: 2024-04-05
Payer: COMMERCIAL

## 2024-04-05 NOTE — TELEPHONE ENCOUNTER
Reason for Disposition   Face swelling is painful to touch    Additional Information   Negative: Life-threatening reaction (anaphylaxis) in the past to similar substance (e.g., food, insect bite/sting, chemical, etc.) and < 2 hours since exposure   Negative: Unresponsive, passed out or very weak   Negative: Swollen tongue   Negative: Difficulty breathing or wheezing   Negative: Sounds like a life-threatening emergency to the triager   Negative: Followed an injury to face   Negative: Bee sting and within last 24 hours   Negative: Mosquito bite suspected   Negative: Insect bite suspected   Negative: Swelling mainly of eyelid(s) and around the eyes   Negative: SEVERE swelling of entire face and < 2 hours since exposure to high-risk allergen (e.g., peanuts, tree nuts, fish, shellfish or 1st dose of drug) and no serious symptoms AND [4] no serious allergic reaction in the past   Negative: Pregnant > 20 weeks   Negative: Postpartum (from 0 to 6 weeks after delivery)   Negative: Fever   Negative: Taking an ACE Inhibitor medicine (e.g., benazepril/LOTENSIN, captopril/CAPOTEN, enalapril/VASOTEC, lisinopril/ZESTRIL)   Negative: Patient sounds very sick or weak to the triager   Negative: SEVERE swelling of the entire face   Negative: Face swelling began after taking a drug   Negative: Looks infected (e.g., spreading redness, pus)   Negative: Looks like a boil or infected sore   Negative: Painful rash with multiple small blisters grouped together (i.e., dermatomal distribution or 'band' or 'stripe')   Negative: Swelling of both lower legs (i.e., bilateral pedal edema)   Negative: Widespread rash on body    Protocols used: Face Swelling-A-OH  Pt states she has facial swelling. States she has a missing tooth and a potato chip irritated her gums. No office appointments are available. Advised of Virtual visit and Urgent Care options. Pt states she will start a Virtual visit.

## 2024-04-10 DIAGNOSIS — B00.2 HERPES GINGIVOSTOMATITIS: ICD-10-CM

## 2024-04-10 DIAGNOSIS — A60.09 HERPES GENITALIS IN WOMEN: ICD-10-CM

## 2024-04-10 NOTE — TELEPHONE ENCOUNTER
Care Due:                  Date            Visit Type   Department     Provider  --------------------------------------------------------------------------------                                EP -                              PRIMARY      [facility] INTER[facility]  Last Visit: 07-      CARE ([facility])   MEDICINE       Quinton Miller  Next Visit: None Scheduled  None         None Found                                                            Last  Test          Frequency    Reason                     Performed    Due Date  --------------------------------------------------------------------------------    CMP.........  12 months..  DULoxetine,                04- 04-                             hydroCHLOROthiazide......    [employer] Embedded Care Due Messages. Reference number: [id number].   4/10/2024 9:33:09 AM CDT

## 2024-04-10 NOTE — TELEPHONE ENCOUNTER
----- Message from Phylicia Gasca sent at 4/10/2024  9:47 AM CDT -----  Regarding: Pt called to speak to the nurse to request a new medication refill and pt would like a call back today  Rx Refill Request:    Pt called to speak to the nurse to request a new medication refill for valACYclovir (VALTREX) 500 MG, 1XDay, 90 Day Supply Tab that was order by Dr. Barry who is no longer with [facility] and pt would like a call back today    Pt would like the Rx Refill sent her local: [id number] [place] [address] phone: [phone]    Pt can be reached at [phone]

## 2024-04-11 NOTE — TELEPHONE ENCOUNTER
Refill Routing Note   Medication(s) are not appropriate for processing by [facility] for the following reason(s):        Required labs outdated    [facility] action(s):  Defer     Requires labs : Yes             Appointments  past 12m or future 3m with PCP    Date Provider   Last Visit   7/31/2023 Quinton Miller MD   Next Visit   Visit date not found Quinton Miller MD   ED visits in past 90 days: 0        Note composed:2:02 PM 04/11/2024

## 2024-04-11 NOTE — TELEPHONE ENCOUNTER
Called patient to schedule an earlier appointment. Patient stated she will keep the appointment on 04/15/2025.

## 2024-04-12 RX ORDER — VALACYCLOVIR HYDROCHLORIDE 500 MG/1
500 TABLET, FILM COATED ORAL
Qty: 90 TABLET | Refills: 0 | OUTPATIENT
Start: 2024-04-12

## 2024-04-15 NOTE — TELEPHONE ENCOUNTER
Provider Staff:  Please note Refusal of medication.     Action required for this patient.      Requested Prescriptions     Refused Prescriptions Disp Refills    valACYclovir (VALTREX) 500 MG tablet [Pharmacy Med Name: VALACYCLOVIR 500MG TABLETS] 90 tablet 0     Sig: TAKE 1 TABLET(500 MG) BY MOUTH EVERY DAY     Refused By: DENISE COLBERT     Reason for Refusal: Patient needs an appointment      Thanks!  [facility]   Note composed: 04/14/2024 9:18 PM

## 2024-04-17 ENCOUNTER — HOSPITAL ENCOUNTER (OUTPATIENT)
Dept: RADIOLOGY | Facility: OTHER | Age: 64
Discharge: HOME | End: 2024-04-17
Attending: NURSE PRACTITIONER
Payer: COMMERCIAL

## 2024-04-17 DIAGNOSIS — M54.9 BACK PAIN, UNSPECIFIED BACK LOCATION, UNSPECIFIED BACK PAIN LATERALITY, UNSPECIFIED CHRONICITY: ICD-10-CM

## 2024-04-17 PROCEDURE — 72082 X-RAY EXAM ENTIRE SPI 2/3 VW: CPT | Mod: TC,FY

## 2024-04-17 PROCEDURE — 72082 X-RAY EXAM ENTIRE SPI 2/3 VW: CPT | Mod: 26,,, | Performed by: RADIOLOGY

## 2024-05-06 DIAGNOSIS — M17.12 PRIMARY OSTEOARTHRITIS OF LEFT KNEE: Primary | ICD-10-CM

## 2024-05-08 ENCOUNTER — TELEPHONE (OUTPATIENT)
Dept: ORTHOPEDICS | Facility: CLINIC | Age: 64
End: 2024-05-08
Payer: COMMERCIAL

## 2024-05-08 DIAGNOSIS — I10 ESSENTIAL HYPERTENSION: ICD-10-CM

## 2024-05-08 NOTE — TELEPHONE ENCOUNTER
Reached out to Pt because Pt was scheduled for 10:30 am declan and did not show up. Pt stated personal issues adhered and asked me to reschedule the declan. Talita date set to June 3rd 2:30 pm with xray's.

## 2024-05-24 ENCOUNTER — TELEPHONE (OUTPATIENT)
Dept: ORTHOPEDICS | Facility: CLINIC | Age: 64
End: 2024-05-24
Payer: COMMERCIAL

## 2024-05-24 NOTE — TELEPHONE ENCOUNTER
Called patient to inform them that their appointment for 6/3 has been rescheduled due to the provider cancelling clinic that day. Appointment has been rescheduled for 6/24@10:30am. Left vm to call clinic back with any questions or concerns.

## 2024-06-06 DIAGNOSIS — I10 ESSENTIAL HYPERTENSION: ICD-10-CM

## 2024-06-06 DIAGNOSIS — R60.0 BILATERAL LOWER EXTREMITY EDEMA: ICD-10-CM

## 2024-06-06 RX ORDER — HYDROCHLOROTHIAZIDE 12.5 MG/1
12.5 TABLET ORAL
Qty: 90 TABLET | Refills: 0 | Status: SHIPPED | OUTPATIENT
Start: 2024-06-06

## 2024-06-06 NOTE — TELEPHONE ENCOUNTER
Refill Routing Note   Medication(s) are not appropriate for processing by [facility] for the following reason(s):        Required labs outdated    [facility] action(s):  Defer               Appointments  past 12m or future 3m with PCP    Date Provider   Last Visit   7/31/2023 Quinton Miller MD   Next Visit   6/13/2024 Quinton Miller MD   ED visits in past 90 days: 0        Note composed:10:51 AM 06/06/2024

## 2024-06-06 NOTE — TELEPHONE ENCOUNTER
No care due was identified.  [facility] Embedded Care Due Messages. Reference number: [id number].   6/06/2024 5:31:46 AM CDT

## 2024-06-10 ENCOUNTER — PATIENT MESSAGE (OUTPATIENT)
Dept: INTERNAL MEDICINE | Facility: CLINIC | Age: 64
End: 2024-06-10
Payer: COMMERCIAL

## 2024-06-12 ENCOUNTER — HOSPITAL ENCOUNTER (OUTPATIENT)
Dept: RADIOLOGY | Facility: HOSPITAL | Age: 64
Discharge: HOME | End: 2024-06-12
Attending: NURSE PRACTITIONER
Payer: COMMERCIAL

## 2024-06-12 ENCOUNTER — OFFICE VISIT (OUTPATIENT)
Dept: ORTHOPEDICS | Facility: CLINIC | Age: 64
End: 2024-06-12
Payer: COMMERCIAL

## 2024-06-12 DIAGNOSIS — M48.02 CERVICAL STENOSIS OF SPINAL CANAL: ICD-10-CM

## 2024-06-12 DIAGNOSIS — M25.561 CHRONIC PAIN OF BOTH KNEES: Primary | ICD-10-CM

## 2024-06-12 DIAGNOSIS — G89.29 CHRONIC PAIN OF BOTH KNEES: Primary | ICD-10-CM

## 2024-06-12 DIAGNOSIS — M25.562 CHRONIC PAIN OF BOTH KNEES: Primary | ICD-10-CM

## 2024-06-12 DIAGNOSIS — M17.12 PRIMARY OSTEOARTHRITIS OF LEFT KNEE: ICD-10-CM

## 2024-06-12 DIAGNOSIS — M25.561 ACUTE PAIN OF RIGHT KNEE: ICD-10-CM

## 2024-06-12 DIAGNOSIS — M25.561 ACUTE PAIN OF RIGHT KNEE: Primary | ICD-10-CM

## 2024-06-12 PROCEDURE — 1160F RVW MEDS BY RX/DR IN RCRD: CPT | Mod: CPTII,S$GLB,, | Performed by: NURSE PRACTITIONER

## 2024-06-12 PROCEDURE — 99214 OFFICE O/P EST MOD 30 MIN: CPT | Mod: S$GLB,,, | Performed by: NURSE PRACTITIONER

## 2024-06-12 PROCEDURE — 73562 X-RAY EXAM OF KNEE 3: CPT | Mod: TC,LT

## 2024-06-12 PROCEDURE — 99999 PR PBB SHADOW E&M-EST. PATIENT-LVL III: CPT | Mod: PBBFAC,,, | Performed by: NURSE PRACTITIONER

## 2024-06-12 PROCEDURE — 73562 X-RAY EXAM OF KNEE 3: CPT | Mod: 26,LT,, | Performed by: RADIOLOGY

## 2024-06-12 PROCEDURE — 1159F MED LIST DOCD IN RCRD: CPT | Mod: CPTII,S$GLB,, | Performed by: NURSE PRACTITIONER

## 2024-06-12 PROCEDURE — 73564 X-RAY EXAM KNEE 4 OR MORE: CPT | Mod: 26,RT,, | Performed by: RADIOLOGY

## 2024-06-12 RX ORDER — METHYLPREDNISOLONE 4 MG/1
TABLET ORAL
Qty: 1 EACH | Refills: 0 | Status: SHIPPED | OUTPATIENT
Start: 2024-06-12 | End: 2024-07-03

## 2024-06-12 NOTE — PROGRESS NOTES
SUBJECTIVE:     Chief Complaint & History of Present Illness:  Chanelle Downey is a Established 64 y.o. year old female patient here with a history of constant right knee pain which started 1-1.5 weeks ago.  There is not a history of trauma.  The pain is located in the global aspect of the knee.  The pain is described as achy, 7-8/10.  There is radiation.  There is not catching or locking.  Aggravating factors include going up and down stairs, lateral movements, rising after sitting, and walking.  Associated symptoms include none.  There is not numbness or tingling of the lower extremity.  But she endorses upper extremity numbness from a chronic cervical pain.  There is back pain. Previous treatments include acetaminophen, OTC NSAIDs, and rest which have provided minimal relief.  There is not a history of previous injury or surgery to the knee.  The patient does use an assistive device.  She works nights as a LPN.    Review of patient's allergies indicates:  No Known Allergies      Current Outpatient Medications   Medication Sig Dispense Refill    hydroCHLOROthiazide (HYDRODIURIL) 12.5 MG Tab TAKE 1 TABLET(12.5 MG) BY MOUTH EVERY DAY 90 tablet 0    aspirin 81 mg Cap Take 81 mg by mouth Daily.      atorvastatin (LIPITOR) 40 MG tablet Take 1 tablet (40 mg total) by mouth once daily. 90 tablet 3    bumetanide (BUMEX) 0.5 MG Tab Take 1 tablet (0.5 mg total) by mouth once daily. 90 tablet 3    ciclopirox (PENLAC) 8 % Soln Apply topically nightly. 6.6 mL 11    DULoxetine (CYMBALTA) 60 MG capsule Take 1 capsule (60 mg total) by mouth 2 (two) times daily. 180 capsule 1    ergocalciferol, vitamin D2, (VITAMIN D ORAL) Take 1 capsule by mouth Daily.      meloxicam (MOBIC) 15 MG tablet Take 1 tablet (15 mg total) by mouth daily as needed for Pain. 30 tablet 1    methadone HCl (METHADONE ORAL) Take 85 mg by mouth once daily.      omeprazole (PRILOSEC) 40 MG capsule Take 1 capsule (40 mg total) by mouth once daily. 90  capsule 1    Ubidecar/Fish Oil/Omega-3/Jude (CO Q10-FISH OIL-OMEGA 3-E ORAL) Take 1 capsule by mouth Daily.      valACYclovir (VALTREX) 500 MG tablet Take 1 tablet (500 mg total) by mouth once daily. 90 tablet 3    vitamin D (VITAMIN D3) 1000 units Tab Take 1,000 Units by mouth once daily.      ZINC ORAL Take 1 capsule by mouth Daily.       No current facility-administered medications for this visit.       Past Medical History:   Diagnosis Date    Back pain, chronic     Biliary colic 2018    Calculus of gallbladder without cholecystitis without obstruction 2018    Cervical radiculopathy 3/21/2016    01/17/18--clinic visit, pt describes intermittent BUE numbness/tingling throughout entire arm/hand.  Negative Spurling's test bilaterally.    Cervical stenosis of spinal canal 3/21/2016    Degenerative cervical spinal stenosis 2/10/2016    Depression     Flat back syndrome, postprocedural 2018    Dx 10/2016 when seen in NSGY clinic.  Presents to Neurology clinic 18 for worsening gait, LBP, LLE weakness, and RLE neuropathic pain.    HSV (herpes simplex virus) anogenital infection 2016       Past Surgical History:   Procedure Laterality Date    APPENDECTOMY       SECTION      EYE SURGERY      For strabismus    HYSTERECTOMY      LAPAROSCOPIC CHOLECYSTECTOMY N/A 2018    Procedure: CHOLECYSTECTOMY, LAPAROSCOPIC ;  Surgeon: Yonatan Berry MD;  Location: [facility] OR [address] [place] [address];  Service: General;  Laterality: N/A;    LUMBAR DISCECTOMY      Unknown vertebra    LUMBAR FUSION  2010    L4-S1 fusion reported    TONSILLECTOMY         Family History   Problem Relation Name Age of Onset    Cancer Mother          non hodgkins lymphoma    Alcohol abuse Mother      Arthritis Mother      Depression Mother      Hypertension Mother      Heart disease Mother      Mental illness Daughter 1     Birth defects Daughter 1      Review of Systems:  ROS:  Constitutional: no fever or chills  Eyes: no visual  "changes  ENT: no nasal congestion or sore throat  Respiratory: no cough or shortness of breath  Cardiovascular: no chest pain or palpitations  Gastrointestinal: no nausea or vomiting, tolerating diet  Genitourinary: no hematuria or dysuria  Integument/Breast: no rash or pruritis  Hematologic/Lymphatic: no easy bruising or lymphadenopathy  Musculoskeletal: no arthralgias or myalgias  Neurological: no seizures or tremors  Behavioral/Psych: no auditory or visual hallucinations  Endocrine: no heat or cold intolerance      OBJECTIVE:     PHYSICAL EXAM:  Vital Signs (Most Recent)  There were no vitals filed for this visit.     ,   Estimated body mass index is 38.96 kg/m² as calculated from the following:    Height as of 12/28/23: 5' 2" (1.575 m).    Weight as of 12/28/23: 96.6 kg (213 lb).   General Appearance: Well nourished, well developed, in no acute distress.  HENT: Normal cephalic, oropharynx pink and moist  Eyes: PERRLA bilaterally and EOM x 4  Respiratory: Even and unlabored  Skin: Warm and Dry.   Psychiatric: AAO x 4, Mood & affect are normal.    right  Knee Exam:  Knee Range of Motion:pain with terminal flexion and pain with terminal extension   Effusion:none  Condition of skin:intact  Location of tenderness:Medial joint line, Lateral joint line, and Medial hamstring   Strength:4 of 5  Stability:  stable to testing, Lachman: stable, LCL: stable, MCL: stable, and PCL: stable  Varus /Valgus stress:  normal  Amber:   negative    Hip Examination:  normal    RADIOGRAPHS:  X-ray of the right knee was obtained, findings show no acute fractures.  Patient has mild medial tibial femoral joint space narrowing on the right.  Patient has advanced tricompartment joint space narrowing on the left consistent with osteoarthritis.  All radiographs were personally reviewed by me.    ASSESSMENT/PLAN:       ICD-10-CM ICD-9-CM   1. Chronic pain of both knees  M25.561 719.46    M25.562 338.29    G89.29    2. Primary osteoarthritis " of left knee  M17.12 715.16   3. Cervical stenosis of spinal canal  M48.02 723.0       -Chanelle Downey presents to clinic today with c/c right knee pain for the past 1-1-1/2 weeks, no trauma.  Patient with known history of osteoarthritis involving the left knee followed by Dr. Mccarthy.  -X-ray as above.    I discussed the x-ray findings with the patient.  In regards to her chronic cervical pain I am going to refer her to back and spine.    I recommend that the patient follow up with Dr. Mccarthy for consideration of a left total knee replacement.  I believe that the patient is accommodating all of her weight on her right leg which is aggravating her right knee.    I will treat the patient with a Medrol Dosepak.    We will give the patient a note for work allowing her to remain off work until Monday.

## 2024-06-14 DIAGNOSIS — M50.30 DDD (DEGENERATIVE DISC DISEASE), CERVICAL: Primary | ICD-10-CM

## 2024-06-19 ENCOUNTER — TELEPHONE (OUTPATIENT)
Dept: ORTHOPEDICS | Facility: CLINIC | Age: 64
End: 2024-06-19
Payer: COMMERCIAL

## 2024-06-19 NOTE — TELEPHONE ENCOUNTER
I spoke to patient about rescheduling her appointment dure to the Provider being out sick. She was okay with rescheduling her appointment.

## 2024-06-21 ENCOUNTER — HOSPITAL ENCOUNTER (OUTPATIENT)
Dept: RADIOLOGY | Facility: HOSPITAL | Age: 64
Discharge: HOME | End: 2024-06-21
Attending: PHYSICIAN ASSISTANT
Payer: COMMERCIAL

## 2024-06-21 ENCOUNTER — OFFICE VISIT (OUTPATIENT)
Dept: SPORTS MEDICINE | Facility: CLINIC | Age: 64
End: 2024-06-21
Payer: COMMERCIAL

## 2024-06-21 VITALS — SYSTOLIC BLOOD PRESSURE: 128 MMHG | HEART RATE: 88 BPM | DIASTOLIC BLOOD PRESSURE: 68 MMHG

## 2024-06-21 DIAGNOSIS — M79.89 PAIN AND SWELLING OF RIGHT LOWER LEG: Primary | ICD-10-CM

## 2024-06-21 DIAGNOSIS — M79.661 PAIN AND SWELLING OF RIGHT LOWER LEG: Primary | ICD-10-CM

## 2024-06-21 DIAGNOSIS — M25.561 ACUTE PAIN OF RIGHT KNEE: ICD-10-CM

## 2024-06-21 DIAGNOSIS — M17.11 OSTEOARTHRITIS OF RIGHT KNEE, UNSPECIFIED OSTEOARTHRITIS TYPE: ICD-10-CM

## 2024-06-21 PROCEDURE — 3078F DIAST BP <80 MM HG: CPT | Mod: CPTII,S$GLB,, | Performed by: PHYSICIAN ASSISTANT

## 2024-06-21 PROCEDURE — 20610 DRAIN/INJ JOINT/BURSA W/O US: CPT | Mod: RT,S$GLB,, | Performed by: PHYSICIAN ASSISTANT

## 2024-06-21 PROCEDURE — 3074F SYST BP LT 130 MM HG: CPT | Mod: CPTII,S$GLB,, | Performed by: PHYSICIAN ASSISTANT

## 2024-06-21 PROCEDURE — 99999 PR PBB SHADOW E&M-EST. PATIENT-LVL III: CPT | Mod: PBBFAC,,, | Performed by: PHYSICIAN ASSISTANT

## 2024-06-21 PROCEDURE — 99214 OFFICE O/P EST MOD 30 MIN: CPT | Mod: 25,S$GLB,, | Performed by: PHYSICIAN ASSISTANT

## 2024-06-21 RX ORDER — TRAMADOL HYDROCHLORIDE 50 MG/1
50-100 TABLET ORAL EVERY 6 HOURS
Qty: 30 TABLET | Refills: 0 | Status: SHIPPED | OUTPATIENT
Start: 2024-06-21

## 2024-06-21 RX ORDER — TRIAMCINOLONE ACETONIDE 40 MG/ML
40 INJECTION, SUSPENSION INTRA-ARTICULAR; INTRAMUSCULAR
Status: COMPLETED | OUTPATIENT
Start: 2024-06-21 | End: 2024-06-21

## 2024-06-21 RX ADMIN — TRIAMCINOLONE ACETONIDE 40 MG: 40 INJECTION, SUSPENSION INTRA-ARTICULAR; INTRAMUSCULAR at 05:06

## 2024-06-24 NOTE — PROGRESS NOTES
DATE: 6/27/2024  PATIENT: Chanelle Downey    Supervising Physician: Félix Bragg M.D.    CHIEF COMPLAINT: neck pain    HISTORY:  Chanelle Dwoney is a 64 y.o. female with pmhx of L4-S1 TLIF in 2010 here for initial evaluation of neck and left arm along with back and bilateral leg pain (Neck - 4, Arm - 5; Back - 5, Legs - 8). The pain has been present for years. The patient describes the pain as dull. The pain is worse with laying flat and improved by nothing in particular. She reports frequent back muscle spasms. There is associated numbness and tingling in her hands (L>R) and intermittently in her feet. There is subjective weakness. She reports losing her balance and falling about 1 time a month for the past 10 years. She presents to the clinic with a cane. Prior treatments have included medrol dose pack, Methadone, Tramadol, Cymbalta, Advil, Tylenol, an STEPHEN years ago, but PT. She recently had a right knee injection that was helpful for her knee pain.  The patient endorses myelopathic symptoms such as handwriting changes or difficulty with buttons/coins/keys. Denies perineal paresthesias, bowel/bladder dysfunction.    PAST MEDICAL/SURGICAL HISTORY:  Past Medical History:   Diagnosis Date    Back pain, chronic     Biliary colic 6/11/2018    Calculus of gallbladder without cholecystitis without obstruction 5/14/2018    Cervical radiculopathy 3/21/2016    01/17/18--clinic visit, pt describes intermittent BUE numbness/tingling throughout entire arm/hand.  Negative Spurling's test bilaterally.    Cervical stenosis of spinal canal 3/21/2016    Degenerative cervical spinal stenosis 2/10/2016    Depression     Flat back syndrome, postprocedural 1/17/2018    Dx 10/2016 when seen in NSGY clinic.  Presents to Neurology clinic 01/17/18 for worsening gait, LBP, LLE weakness, and RLE neuropathic pain.    HSV (herpes simplex virus) anogenital infection 9/16/2016     Past Surgical History:   Procedure Laterality Date     APPENDECTOMY       SECTION      EYE SURGERY      For strabismus    HYSTERECTOMY      LAPAROSCOPIC CHOLECYSTECTOMY N/A 2018    Procedure: CHOLECYSTECTOMY, LAPAROSCOPIC ;  Surgeon: Yonatan Berry MD;  Location: [facility] OR [address] [place] [address];  Service: General;  Laterality: N/A;    LUMBAR DISCECTOMY      Unknown vertebra    LUMBAR FUSION  2010    L4-S1 fusion reported    TONSILLECTOMY         Medications:  Current Outpatient Medications on File Prior to Visit   Medication Sig Dispense Refill    aspirin 81 mg Cap Take 81 mg by mouth Daily.      ciclopirox (PENLAC) 8 % Soln Apply topically nightly. 6.6 mL 11    DULoxetine (CYMBALTA) 60 MG capsule Take 1 capsule (60 mg total) by mouth 2 (two) times daily. 180 capsule 1    ergocalciferol, vitamin D2, (VITAMIN D ORAL) Take 1 capsule by mouth Daily.      hydroCHLOROthiazide (HYDRODIURIL) 12.5 MG Tab TAKE 1 TABLET(12.5 MG) BY MOUTH EVERY DAY 90 tablet 0    methadone HCl (METHADONE ORAL) Take 85 mg by mouth once daily.      omeprazole (PRILOSEC) 40 MG capsule Take 1 capsule (40 mg total) by mouth once daily. 90 capsule 1    traMADoL (ULTRAM) 50 mg tablet Take 1-2 tablets ( mg total) by mouth every 6 (six) hours. 30 tablet 0    Ubidecar/Fish Oil/Omega-3/Jude (CO Q10-FISH OIL-OMEGA 3-E ORAL) Take 1 capsule by mouth Daily.      valACYclovir (VALTREX) 500 MG tablet Take 1 tablet (500 mg total) by mouth once daily. 90 tablet 3    vitamin D (VITAMIN D3) 1000 units Tab Take 1,000 Units by mouth once daily.      ZINC ORAL Take 1 capsule by mouth Daily.      atorvastatin (LIPITOR) 40 MG tablet Take 1 tablet (40 mg total) by mouth once daily. 90 tablet 3    bumetanide (BUMEX) 0.5 MG Tab Take 1 tablet (0.5 mg total) by mouth once daily. 90 tablet 3     No current facility-administered medications on file prior to visit.       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      Social Determinants of Health     Financial Resource Strain: Medium Risk (12/26/2023)    Overall Financial Resource Strain (CARDIA)     Difficulty of Paying Living Expenses: Somewhat hard   Food Insecurity: Food Insecurity Present (12/26/2023)    Hunger Vital Sign     Worried About Running Out of Food in the Last Year: Sometimes true     Ran Out of Food in the Last Year: Sometimes true   Transportation Needs: No Transportation Needs (12/26/2023)    PRAPARE - Transportation     Lack of Transportation (Medical): No     Lack of Transportation (Non-Medical): No   Physical Activity: Unknown (12/26/2023)    Exercise Vital Sign     Days of Exercise per Week: 0 days   Stress: No Stress Concern Present (12/26/2023)    [employer] - Occupational Stress Questionnaire     Feeling of Stress : Not at all   Housing Stability: High Risk (12/26/2023)    Housing Stability Vital Sign     Unable to Pay for Housing in the Last Year: Yes     Unstable Housing in the Last Year: No       REVIEW OF SYSTEMS:  Constitution: Negative. Negative for chills, fever and night sweats.   Cardiovascular: Negative for chest pain and syncope.   Respiratory: Negative for cough and shortness of breath.   Gastrointestinal: See HPI. Negative for nausea/vomiting. Negative for abdominal pain.  Genitourinary: See HPI. Negative for discoloration or dysuria.  Skin: Negative for dry skin, itching and rash.   Hematologic/Lymphatic: Negative for bleeding problem. Does not bruise/bleed easily.   Musculoskeletal:  "Negative for falls and muscle weakness.   Neurological: See HPI. No seizures.   Endocrine: Negative for polydipsia, polyphagia and polyuria.   Allergic/Immunologic: Negative for hives and persistent infections.  Psychiatric/Behavioral: Negative for depression and insomnia.         EXAM:  Ht 5' 2" (1.575 m)   Wt 95.5 kg (210 lb 10.4 oz)   BMI 38.53 kg/m²     General: The patient is a 64 y.o. female in no apparent distress, the patient is oriented to person, place and time.  Psych: Normal mood and affect  HEENT: Vision grossly intact, hearing intact to the spoken word.  Lungs: Respirations unlabored.  Gait: antalgic station and gait,  difficulty with toe or heel walk.   Skin: Cervical skin negative for rashes, lesions, hairy patches and surgical scars.  Range of motion: Cervical range of motion is acceptable. There is moderate tenderness to palpation.  Spinal Balance: Global saggital and coronal spinal balance acceptable, no significant for scoliosis. Significant kyphosis.  Musculoskeletal: No pain with the range of motion of the bilateral shoulders and elbows. Normal bulk and contour of the bilateral hands.  Vascular: Bilateral hands warm and well perfused, radial pulses 2+ bilaterally.  Neurological: decreased strength and tone in all major motor groups in the bilateral upper and lower extremities. decreased sensation to light touch in the C5-T1 dermatomes in the LUE.  Deep tendon reflexes symmetric 2+ in the bilateral upper and lower extremities.  Negative Inverted Radial Reflex and Chance's bilaterally. Negative Babinski bilaterally.     IMAGING:   Today I personally reviewed AP, Lat and Flex/Ex  upright C-spine films that demonstrate mild DDD throughout.      Body mass index is 38.53 kg/m².    Hemoglobin A1C   Date Value Ref Range Status   02/16/2023 4.9 4.0 - 5.6 % Final     Comment:     ADA Screening Guidelines:  5.7-6.4%  Consistent with prediabetes  >or=6.5%  Consistent with diabetes    High levels of " fetal hemoglobin interfere with the HbA1C  assay. Heterozygous hemoglobin variants (HbS, HgC, etc)do  not significantly interfere with this assay.   However, presence of multiple variants may affect accuracy.     12/21/2021 4.8 4.0 - 5.6 % Final     Comment:     ADA Screening Guidelines:  5.7-6.4%  Consistent with prediabetes  >or=6.5%  Consistent with diabetes    High levels of fetal hemoglobin interfere with the HbA1C  assay. Heterozygous hemoglobin variants (HbS, HgC, etc)do  not significantly interfere with this assay.   However, presence of multiple variants may affect accuracy.             ASSESSMENT/PLAN:    Chanelle was seen today for neck pain.    Diagnoses and all orders for this visit:    DDD (degenerative disc disease), cervical  -     gabapentin (NEURONTIN) 100 MG capsule; Take 1 capsule (100 mg total) by mouth 3 (three) times daily.  -     X-Ray Scoliosis Complete; Future    Lumbar radiculopathy, chronic  -     gabapentin (NEURONTIN) 100 MG capsule; Take 1 capsule (100 mg total) by mouth 3 (three) times daily.  -     MRI Lumbar Spine W WO Contrast; Future  -     Ambulatory referral/consult to Physical/Occupational Therapy; Future    S/P spinal surgery  -     X-Ray Scoliosis Complete; Future  -     MRI Lumbar Spine W WO Contrast; Future    Cervical myelopathy  -     MRI Cervical Spine Without Contrast; Future  -     MRI Thoracic Spine Without Contrast; Future  -     Ambulatory referral/consult to Physical/Occupational Therapy; Future    Routine lab draw  -     Creatinine, serum; Future      Today we discussed at length all of the different treatment options including anti-inflammatories, acetaminophen, rest, ice, heat, physical therapy including strengthening and stretching exercises, home exercises, ROM, aerobic conditioning, aqua therapy, other modalities including ultrasound, massage, and dry needling, epidural steroid injections and finally surgical intervention.  MRIs ordered, she will follow up in the  clinic for results. I have educated on the importance to stop smoking. PT orders placed.

## 2024-06-26 ENCOUNTER — HOSPITAL ENCOUNTER (OUTPATIENT)
Dept: RADIOLOGY | Facility: OTHER | Age: 64
Discharge: HOME | End: 2024-06-26
Attending: PHYSICIAN ASSISTANT
Payer: COMMERCIAL

## 2024-06-26 ENCOUNTER — PATIENT OUTREACH (OUTPATIENT)
Dept: ADMINISTRATIVE | Facility: HOSPITAL | Age: 64
End: 2024-06-26
Payer: COMMERCIAL

## 2024-06-26 DIAGNOSIS — M79.661 PAIN AND SWELLING OF RIGHT LOWER LEG: ICD-10-CM

## 2024-06-26 DIAGNOSIS — M79.89 PAIN AND SWELLING OF RIGHT LOWER LEG: ICD-10-CM

## 2024-06-26 PROCEDURE — 93971 EXTREMITY STUDY: CPT | Mod: 26,RT,, | Performed by: RADIOLOGY

## 2024-06-26 PROCEDURE — 93971 EXTREMITY STUDY: CPT | Mod: TC,RT

## 2024-06-26 NOTE — PROGRESS NOTES
Health Maintenance Due   Topic Date Due    Pneumococcal Vaccines (Age 0-64) (1 of 2 - PCV) Never done    Colorectal Cancer Screening  Never done    LDCT Lung Screen  Never done    Shingles Vaccine (1 of 2) Never done    RSV Vaccine (Age 60+ and Pregnant patients) (1 - 1-dose 60+ series) Never done    COVID-19 Vaccine (1 - 2023-24 season) Never done    Mammogram  02/15/2024     Triggered LINKS and reconciled immunizations. Updated Care Everywhere. Chart review completed as part of April [employer] List report.

## 2024-06-27 ENCOUNTER — OFFICE VISIT (OUTPATIENT)
Dept: ORTHOPEDICS | Facility: CLINIC | Age: 64
End: 2024-06-27
Payer: COMMERCIAL

## 2024-06-27 ENCOUNTER — HOSPITAL ENCOUNTER (OUTPATIENT)
Dept: RADIOLOGY | Facility: HOSPITAL | Age: 64
Discharge: HOME | End: 2024-06-27
Attending: PHYSICIAN ASSISTANT
Payer: COMMERCIAL

## 2024-06-27 ENCOUNTER — TELEPHONE (OUTPATIENT)
Dept: SPORTS MEDICINE | Facility: CLINIC | Age: 64
End: 2024-06-27
Payer: COMMERCIAL

## 2024-06-27 ENCOUNTER — PATIENT MESSAGE (OUTPATIENT)
Dept: SPORTS MEDICINE | Facility: CLINIC | Age: 64
End: 2024-06-27
Payer: COMMERCIAL

## 2024-06-27 VITALS — HEIGHT: 62 IN | WEIGHT: 210.63 LBS | BODY MASS INDEX: 38.76 KG/M2

## 2024-06-27 DIAGNOSIS — M50.30 DDD (DEGENERATIVE DISC DISEASE), CERVICAL: ICD-10-CM

## 2024-06-27 DIAGNOSIS — Z01.89 ROUTINE LAB DRAW: ICD-10-CM

## 2024-06-27 DIAGNOSIS — M54.16 LUMBAR RADICULOPATHY, CHRONIC: ICD-10-CM

## 2024-06-27 DIAGNOSIS — M50.30 DDD (DEGENERATIVE DISC DISEASE), CERVICAL: Primary | ICD-10-CM

## 2024-06-27 DIAGNOSIS — Z98.890 S/P SPINAL SURGERY: ICD-10-CM

## 2024-06-27 DIAGNOSIS — G95.9 CERVICAL MYELOPATHY: ICD-10-CM

## 2024-06-27 PROCEDURE — 99999 PR PBB SHADOW E&M-EST. PATIENT-LVL IV: CPT | Mod: PBBFAC,,, | Performed by: REGISTERED NURSE

## 2024-06-27 PROCEDURE — 72050 X-RAY EXAM NECK SPINE 4/5VWS: CPT | Mod: TC

## 2024-06-27 PROCEDURE — 72050 X-RAY EXAM NECK SPINE 4/5VWS: CPT | Mod: 26,,, | Performed by: INTERNAL MEDICINE

## 2024-06-27 RX ORDER — GABAPENTIN 100 MG/1
100 CAPSULE ORAL 3 TIMES DAILY
Qty: 90 CAPSULE | Refills: 11 | Status: SHIPPED | OUTPATIENT
Start: 2024-06-27 | End: 2025-06-27

## 2024-06-27 NOTE — TELEPHONE ENCOUNTER
----- Message from Austin Yee III, PA-C sent at 6/26/2024  4:42 PM CDT -----    Please call patient and let her know that she does not have a blood clot in her leg.  ----- Message -----  From: Interface, Rad Results In  Sent: 6/26/2024   2:04 PM CDT  To: Austin Yee III, PA-C

## 2024-06-27 NOTE — PROGRESS NOTES
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    Medial knee pain radiates up to the groin at times. Pain has been present for 3 weeks and worsening over that time. Pain is severe. She also reports some swelling of her right lower leg. She took a steroid pack that helped some but not a lot.   She reports calf pain with walking since her knee pain has been worse.     She reports that the pain is worse with steps.  It also bothers her at night.    Is affecting ADLs.     No mechanical symptoms, no instability    Review of Systems   Constitution: Negative. Negative for chills, fever and night sweats.   HENT: Negative for congestion and headaches.    Eyes: Negative for blurred vision, left vision loss and right vision loss.   Cardiovascular: Negative for chest pain and syncope.   Respiratory: Negative for cough and shortness of breath.    Endocrine: Negative for polydipsia, polyphagia and polyuria.   Hematologic/Lymphatic: Negative for bleeding problem. Does not bruise/bleed easily.   Skin: Negative for dry skin, itching and rash.   Musculoskeletal: Negative for falls and muscle weakness.   Gastrointestinal: Negative for abdominal pain and bowel incontinence.   Genitourinary: Negative for bladder incontinence and nocturia.   Neurological: Negative for disturbances in coordination, loss of balance and seizures.   Psychiatric/Behavioral: Negative for depression. The patient does not have insomnia.    Allergic/Immunologic: Negative for hives and persistent infections.   All other systems negative      PAST MEDICAL HISTORY:   Past Medical History:   Diagnosis Date    Back pain, chronic     Biliary colic 6/11/2018    Calculus of gallbladder without cholecystitis without obstruction 5/14/2018    Cervical radiculopathy 3/21/2016    01/17/18--clinic visit, pt describes intermittent BUE numbness/tingling throughout entire arm/hand.  Negative  Spurling's test bilaterally.    Cervical stenosis of spinal canal 3/21/2016    Degenerative cervical spinal stenosis 2/10/2016    Depression     Flat back syndrome, postprocedural 2018    Dx 10/2016 when seen in NSGY clinic.  Presents to Neurology clinic 18 for worsening gait, LBP, LLE weakness, and RLE neuropathic pain.    HSV (herpes simplex virus) anogenital infection 2016     PAST SURGICAL HISTORY:   Past Surgical History:   Procedure Laterality Date    APPENDECTOMY       SECTION      EYE SURGERY      For strabismus    HYSTERECTOMY      LAPAROSCOPIC CHOLECYSTECTOMY N/A 2018    Procedure: CHOLECYSTECTOMY, LAPAROSCOPIC ;  Surgeon: Yonatan Berry MD;  Location: [facility] OR [address] [place] [address];  Service: General;  Laterality: N/A;    LUMBAR DISCECTOMY      Unknown vertebra    LUMBAR FUSION  2010    L4-S1 fusion reported    TONSILLECTOMY       FAMILY HISTORY:   Family History   Problem Relation Name Age of Onset    Cancer Mother          non hodgkins lymphoma    Alcohol abuse Mother      Arthritis Mother      Depression Mother      Hypertension Mother      Heart disease Mother      Mental illness Daughter 1     Birth defects Daughter 1      SOCIAL HISTORY:   Social History     Socioeconomic History    Marital status:    Occupational History    Occupation: LPN   Tobacco Use    Smoking status: Every Day     Current packs/day: 1.00     Average packs/day: 1 pack/day for 20.0 years (20.0 ttl pk-yrs)     Types: Cigarettes    Smokeless tobacco: Never   Substance and Sexual Activity    Alcohol use: Not Currently     Alcohol/week: 0.0 standard drinks of alcohol    Drug use: No    Sexual activity: Not Currently     Partners: Male   Social History Narrative    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      Social Determinants of Health     Financial Resource Strain: Medium Risk (2023)    Overall Financial Resource Strain (CARDIA)     Difficulty of Paying Living Expenses: Somewhat hard    Food Insecurity: Food Insecurity Present (12/26/2023)    Hunger Vital Sign     Worried About Running Out of Food in the Last Year: Sometimes true     Ran Out of Food in the Last Year: Sometimes true   Transportation Needs: No Transportation Needs (12/26/2023)    PRAPARE - Transportation     Lack of Transportation (Medical): No     Lack of Transportation (Non-Medical): No   Physical Activity: Unknown (12/26/2023)    Exercise Vital Sign     Days of Exercise per Week: 0 days   Stress: No Stress Concern Present (12/26/2023)    [employer] - Occupational Stress Questionnaire     Feeling of Stress : Not at all   Housing Stability: High Risk (12/26/2023)    Housing Stability Vital Sign     Unable to Pay for Housing in the Last Year: Yes     Unstable Housing in the Last Year: No       MEDICATIONS:   Current Outpatient Medications:     aspirin 81 mg Cap, Take 81 mg by mouth Daily., Disp: , Rfl:     ciclopirox (PENLAC) 8 % Soln, Apply topically nightly., Disp: 6.6 mL, Rfl: 11    DULoxetine (CYMBALTA) 60 MG capsule, Take 1 capsule (60 mg total) by mouth 2 (two) times daily., Disp: 180 capsule, Rfl: 1    hydroCHLOROthiazide (HYDRODIURIL) 12.5 MG Tab, TAKE 1 TABLET(12.5 MG) BY MOUTH EVERY DAY, Disp: 90 tablet, Rfl: 0    methadone HCl (METHADONE ORAL), Take 85 mg by mouth once daily., Disp: , Rfl:     omeprazole (PRILOSEC) 40 MG capsule, Take 1 capsule (40 mg total) by mouth once daily., Disp: 90 capsule, Rfl: 1    atorvastatin (LIPITOR) 40 MG tablet, Take 1 tablet (40 mg total) by mouth once daily., Disp: 90 tablet, Rfl: 3    bumetanide (BUMEX) 0.5 MG Tab, Take 1 tablet (0.5 mg total) by mouth once daily., Disp: 90 tablet, Rfl: 3    ergocalciferol, vitamin D2, (VITAMIN D ORAL), Take 1 capsule by mouth Daily., Disp: , Rfl:     gabapentin (NEURONTIN) 100 MG capsule, Take 1 capsule (100 mg total) by mouth 3 (three) times daily., Disp: 90 capsule, Rfl: 11    traMADoL (ULTRAM) 50 mg tablet, Take 1-2  tablets ( mg total) by mouth every 6 (six) hours., Disp: 30 tablet, Rfl: 0    Ubidecar/Fish Oil/Omega-3/Jude (CO Q10-FISH OIL-OMEGA 3-E ORAL), Take 1 capsule by mouth Daily., Disp: , Rfl:     valACYclovir (VALTREX) 500 MG tablet, Take 1 tablet (500 mg total) by mouth once daily., Disp: 90 tablet, Rfl: 3    vitamin D (VITAMIN D3) 1000 units Tab, Take 1,000 Units by mouth once daily., Disp: , Rfl:     ZINC ORAL, Take 1 capsule by mouth Daily., Disp: , Rfl:   ALLERGIES: Review of patient's allergies indicates:  No Known Allergies    VITAL SIGNS: /68   Pulse 88      PHYSICAL EXAMINATION    General:  The patient is alert and oriented x 3.  Mood is pleasant.  Observation of ears, eyes and nose reveal no gross abnormalities.  HEENT: NCAT, sclera nonicteric  Lungs: Respirations are equal and unlabored.    right  KNEE EXAMINATION     OBSERVATION / INSPECTION   Gait:   Nonantalgic   Alignment:  Neutral   Scars:   None   Muscle atrophy: Mild  Effusion:  None   Warmth:  None   Discoloration:   none     TENDERNESS / CREPITUS (T / C):          T / C      T / C   Patella   - / -   Lateral joint line   + / -      Peripatellar medial  +  Medial joint line    + / -   Peripatellar lateral +  Medial plica   - / -   Patellar tendon -   Popliteal fossa  - / -   Quad tendon   -   Gastrocnemius   -   Prepatellar Bursa - / -   Quadricep   -   Tibial tubercle  -  Thigh/hamstring  -   Pes anserine/HS -  Fibula    -   ITB   - / -  Tibia     -   Tib/fib joint  - / -  LCL    -     MFC   - / -   MCL: Proximal  -    LFC   - / -    Distal   -          ROM: (* = pain)  PASSIVE   ACTIVE    Left :   5 / 0 / 145   5 / 0 / 145     Right :     / 0 / 100    / 0 / 100    Patellofemoral examination:  See above noted areas of tenderness.   Patella position    Subluxation / dislocation: Centered           Sup. / Inf;   Normal   Crepitus (PF):    Absent   Patellar Mobility:       Medial-lateral:   Normal    Superior-inferior:  Normal    Inferior  tilt   Normal    Patellar tendon:  Normal   Lateral tilt:    Normal   J-sign:     None   Patellofemoral grind:   +       MENISCAL SIGNS:     Pain on terminal extension:  +  Pain on terminal flexion:  +  Ambers maneuver:  -  Squat     NT    LIGAMENT EXAMINATION:  ACL / Lachman:  normal (-1 to 2mm)    PCL-Post.  drawer: normal 0 to 2mm  MCL- Valgus:  normal 0 to 2mm  LCL- Varus:  normal 0 to 2mm      STRENGTH: (* = with pain) PAINFUL SIDE   Quadricep   5/5   Hamstrin/5    EXTREMITY NEURO-VASCULAR EXAMINATION:   Sensation:  Grossly intact to light touch all dermatomal regions.   Motor Function:  Fully intact motor function at hip, knee, foot and ankle    DTRs;  quadriceps and  achilles 2+.  No clonus and downgoing Babinski.    Vascular status:  DP and PT pulses 2+, brisk capillary refill, symmetric.     Other Findings:  + step down bilat  + bridge test     2+ edema of the right lower leg and calf with TTP. + kevin sign.     Xrays:  Xrays of the bilateral knees with flexion were ordered and reviewed by me today. No fracture, subluxation.  DJD with the significant narrowing of the lateral tibiofemoral joint space on the left side. Slight narrowing of the medial tibiofemoral joint spaces noted bilaterally.   Kellgren Nikhil 2 or greater noted.         ASSESSMENT:    1. Right Knee pain, acute  2. Right knee osteoarthritis   Bilateral hip abd/core weakness    PLAN:    1. PROCEDURE NOTE: right KNEE INJECTION  After time out was performed, including verification of patient ID, procedure, site and side, availability of information and equipment, review of safety issues, and agreement with consent, the procedure site was marked and the patient was prepped aseptically. A diagnostic and therapeutic injection of 1cc 40 mg kenalog and 2cc of 1% lidocaine and 2cc of 0.25% bupivacaine was given under sterile technique using a 22g x 1.5 needle into the anterior lateral aspect of the right knee in seated position.   The  patient had no adverse reactions to the medication. Pain decreased. The patient was instructed to apply ice to the joint for 20 minutes and avoid strenuous activities for 24-36 hours following the injection. Patient was warned of possible blood sugar and/or blood pressure changes during that time. Following that time, patient can resume regular activities.   2. Ice compresses prn pain  3. Ultrasound of RLE to rule out DVT.   4. Tramadol as needed for pain until CSI kicks in.      All questions were answered, pt will contact us for questions or concerns in the interim.

## 2024-07-06 ENCOUNTER — ON-DEMAND VIRTUAL (OUTPATIENT)
Dept: URGENT CARE | Facility: CLINIC | Age: 64
End: 2024-07-06
Payer: COMMERCIAL

## 2024-07-06 ENCOUNTER — NURSE TRIAGE (OUTPATIENT)
Dept: ADMINISTRATIVE | Facility: CLINIC | Age: 64
End: 2024-07-06
Payer: COMMERCIAL

## 2024-07-06 DIAGNOSIS — R22.0 FACIAL SWELLING: ICD-10-CM

## 2024-07-06 DIAGNOSIS — K04.7 TOOTH ABSCESS: Primary | ICD-10-CM

## 2024-07-06 PROCEDURE — 99214 OFFICE O/P EST MOD 30 MIN: CPT | Mod: 95,,, | Performed by: NURSE PRACTITIONER

## 2024-07-06 RX ORDER — AMOXICILLIN AND CLAVULANATE POTASSIUM 875; 125 MG/1; MG/1
1 TABLET, FILM COATED ORAL EVERY 12 HOURS
Qty: 14 TABLET | Refills: 0 | Status: SHIPPED | OUTPATIENT
Start: 2024-07-06 | End: 2024-07-13

## 2024-07-06 NOTE — PROGRESS NOTES
Subjective:      Patient ID: Chanelle Downey is a 64 y.o. female.    Vitals:  vitals were not taken for this visit.     Chief Complaint: Dental Pain      Visit Type: TELE AUDIOVISUAL    Present with the patient at the time of consultation: TELEMED PRESENT WITH PATIENT: None        Past Medical History:   Diagnosis Date    Back pain, chronic     Biliary colic 2018    Calculus of gallbladder without cholecystitis without obstruction 2018    Cervical radiculopathy 3/21/2016    01/17/18--clinic visit, pt describes intermittent BUE numbness/tingling throughout entire arm/hand.  Negative Spurling's test bilaterally.    Cervical stenosis of spinal canal 3/21/2016    Degenerative cervical spinal stenosis 2/10/2016    Depression     Flat back syndrome, postprocedural 2018    Dx 10/2016 when seen in NSGY clinic.  Presents to Neurology clinic 18 for worsening gait, LBP, LLE weakness, and RLE neuropathic pain.    HSV (herpes simplex virus) anogenital infection 2016     Past Surgical History:   Procedure Laterality Date    APPENDECTOMY       SECTION      EYE SURGERY      For strabismus    HYSTERECTOMY      LAPAROSCOPIC CHOLECYSTECTOMY N/A 2018    Procedure: CHOLECYSTECTOMY, LAPAROSCOPIC ;  Surgeon: Yonatan Berry MD;  Location: [facility] OR [address] [place] [address];  Service: General;  Laterality: N/A;    LUMBAR DISCECTOMY      Unknown vertebra    LUMBAR FUSION  2010    L4-S1 fusion reported    TONSILLECTOMY       Review of patient's allergies indicates:  No Known Allergies  Current Outpatient Medications on File Prior to Visit   Medication Sig Dispense Refill    aspirin 81 mg Cap Take 81 mg by mouth Daily.      atorvastatin (LIPITOR) 40 MG tablet Take 1 tablet (40 mg total) by mouth once daily. 90 tablet 3    bumetanide (BUMEX) 0.5 MG Tab Take 1 tablet (0.5 mg total) by mouth once daily. 90 tablet 3    ciclopirox (PENLAC) 8 % Soln Apply topically nightly. 6.6 mL 11    DULoxetine (CYMBALTA) 60 MG  capsule Take 1 capsule (60 mg total) by mouth 2 (two) times daily. 180 capsule 1    ergocalciferol, vitamin D2, (VITAMIN D ORAL) Take 1 capsule by mouth Daily.      gabapentin (NEURONTIN) 100 MG capsule Take 1 capsule (100 mg total) by mouth 3 (three) times daily. 90 capsule 11    hydroCHLOROthiazide (HYDRODIURIL) 12.5 MG Tab TAKE 1 TABLET(12.5 MG) BY MOUTH EVERY DAY 90 tablet 0    methadone HCl (METHADONE ORAL) Take 85 mg by mouth once daily.      omeprazole (PRILOSEC) 40 MG capsule Take 1 capsule (40 mg total) by mouth once daily. 90 capsule 1    traMADoL (ULTRAM) 50 mg tablet Take 1-2 tablets ( mg total) by mouth every 6 (six) hours. 30 tablet 0    Ubidecar/Fish Oil/Omega-3/Jude (CO Q10-FISH OIL-OMEGA 3-E ORAL) Take 1 capsule by mouth Daily.      valACYclovir (VALTREX) 500 MG tablet Take 1 tablet (500 mg total) by mouth once daily. 90 tablet 3    vitamin D (VITAMIN D3) 1000 units Tab Take 1,000 Units by mouth once daily.      ZINC ORAL Take 1 capsule by mouth Daily.       No current facility-administered medications on file prior to visit.     Family History   Problem Relation Name Age of Onset    Cancer Mother          non hodgkins lymphoma    Alcohol abuse Mother      Arthritis Mother      Depression Mother      Hypertension Mother      Heart disease Mother      Mental illness Daughter 1     Birth defects Daughter 1        Medications Ordered                [id number] [place] [address] [employer] [facility] & [address] [address]    Telephone: [phone]   Fax: [phone]   Hours: Not open 24 hours                         E-Prescribed (1 of 1)              amoxicillin-clavulanate 875-125mg (AUGMENTIN) 875-125 mg per tablet    Sig: Take 1 tablet by mouth every 12 (twelve) hours. for 7 days       Start: 7/6/24     Quantity: 14 tablet Refills: 0                           Ohs Peq [facility] Intake    7/6/2024  9:15 AM CDT - Filed by Patient    What is your current physical address in the event of a medical emergency? [address]   Are you able to take your vital signs? No   Please attach any relevant images or files          65 yo female with c/o right tooth pain and abscess. She states she cracked right upper tooth. Pain 8/10. She states nerve exposure. She denies fever. Denies sore throat. She states right sided facial swelling no weakness        Constitution: Negative.   HENT:  Positive for mouth sores.    Cardiovascular: Negative.    Respiratory: Negative.     Gastrointestinal: Negative.    Endocrine: negative.   Genitourinary: Negative.  Negative for frequency and urgency.   Musculoskeletal: Negative.    Skin: Negative.    Allergic/Immunologic: Negative.    Neurological: Negative.    Hematologic/Lymphatic: Negative.    Psychiatric/Behavioral: Negative.          Objective:   The physical exam was conducted virtually.  LOCATION OF PATIENT home  Physical Exam   Constitutional: She is oriented to person, place, and time. She appears well-developed.   HENT:   Head: Normocephalic and atraumatic.   Ears:   Right Ear: Hearing, tympanic membrane and external ear normal.   Left Ear: Hearing, tympanic membrane and external ear normal.   Nose: Nose normal.   Mouth/Throat: Uvula is midline, oropharynx is clear and moist and mucous membranes are normal.   Eyes: Conjunctivae and EOM are normal. Pupils are equal, round, and reactive to light.   Neck: Neck supple.   Cardiovascular: Normal rate.   Pulmonary/Chest: Effort normal and breath sounds normal.   Musculoskeletal: Normal range of motion.         General: Normal range of motion.   Neurological: She is alert and oriented to person, place, and time.   Skin: Skin is warm.   Psychiatric: Her behavior is normal. Thought content normal.   Nursing note and vitals reviewed.      Assessment:     1. Tooth abscess    2. Facial swelling        Plan:       Tooth abscess  -     amoxicillin-clavulanate 875-125mg  (AUGMENTIN) 875-125 mg per tablet; Take 1 tablet by mouth every 12 (twelve) hours. for 7 days  Dispense: 14 tablet; Refill: 0    Facial swelling  -     amoxicillin-clavulanate 875-125mg (AUGMENTIN) 875-125 mg per tablet; Take 1 tablet by mouth every 12 (twelve) hours. for 7 days  Dispense: 14 tablet; Refill: 0

## 2024-07-06 NOTE — TELEPHONE ENCOUNTER
"Pt with tooth pain/cracked tooth in her upper right, this happened 2 days ago.  Denies any injury to the area.  States she was eating when this happened.  Pt has an appointment with her dentist on Monday at 11am.    Care advice states to see a provider within 4 hours.  [facility] offered and accepted.  No appointments were available today in clinics.  Patient verbally understands, all questions answered, advised to call back for any worsening symptoms or further needs.     Reason for Disposition   Face is very swollen    Additional Information   Negative: Shock suspected (e.g., cold/pale/clammy skin, too weak to stand, low BP, rapid pulse)   Negative: [1] Similar pain previously AND [2] it was from "heart attack"   Negative: [1] Similar pain previously AND [2] it was from "angina" AND [3] not relieved by nitroglycerin   Negative: Sounds like a life-threatening emergency to the triager   Negative: Tongue is very swollen and tender   Negative: [1] Face is swollen AND [2] fever   Negative: Patient sounds very sick or weak to the triager   Negative: [1] SEVERE pain (e.g., excruciating, unable to eat, unable to do any normal activities) AND [2] not improved 2 hours after pain medicine    Protocols used: Toothache-A-AH    "

## 2024-07-08 ENCOUNTER — TELEPHONE (OUTPATIENT)
Dept: INTERNAL MEDICINE | Facility: CLINIC | Age: 64
End: 2024-07-08
Payer: COMMERCIAL

## 2024-07-08 NOTE — TELEPHONE ENCOUNTER
----- Message from Jo Isabel sent at 7/6/2024  8:39 AM CDT -----  Contact: pt  Type:  Needs Medical Advice    Who Called: pt   Symptoms (please be specific): Tooth Abcess   How long has patient had these symptoms:  2 days   Pharmacy name and phone #:  [facility] #[id number] - [place] [address]   Phone: [phone]  Fax: [phone]        Would the patient rather a call back or a response via [facility]? Call   Best Call Back Number:  [phone]  Additional Information:

## 2024-07-15 ENCOUNTER — TELEPHONE (OUTPATIENT)
Dept: SPORTS MEDICINE | Facility: CLINIC | Age: 64
End: 2024-07-15
Payer: COMMERCIAL

## 2024-07-15 NOTE — TELEPHONE ENCOUNTER
Spoke with patient regarding message, patient will send paperwork to correct number provided to her during our conversation.

## 2024-07-15 NOTE — TELEPHONE ENCOUNTER
----- Message from Barber Downey sent at 7/15/2024 11:01 AM CDT -----  Regarding: Pt Advice  Contact: [phone]  Pt calling to confirm that office received faxed documents. Pt was due to come in today, but went to wrong location. Rescheduled for 7/18. Please call to confirm [phone]

## 2024-07-16 ENCOUNTER — TELEPHONE (OUTPATIENT)
Dept: SPORTS MEDICINE | Facility: CLINIC | Age: 64
End: 2024-07-16
Payer: COMMERCIAL

## 2024-07-16 NOTE — TELEPHONE ENCOUNTER
----- Message from Amanda Singletary sent at 7/16/2024 12:24 PM CDT -----  Regarding: Documents sent  Contact: [phone]  Type:  Needs Medical Advice    Who Called: Maite Rowland called to verify if documents had been received    Would the patient rather a call back or a response via [facility]? both  Best Call Back Number: [phone]    Additional Information:

## 2024-07-18 ENCOUNTER — OFFICE VISIT (OUTPATIENT)
Dept: SPORTS MEDICINE | Facility: CLINIC | Age: 64
End: 2024-07-18
Payer: COMMERCIAL

## 2024-07-18 VITALS
HEIGHT: 62 IN | BODY MASS INDEX: 38.91 KG/M2 | WEIGHT: 211.44 LBS | SYSTOLIC BLOOD PRESSURE: 120 MMHG | DIASTOLIC BLOOD PRESSURE: 71 MMHG | HEART RATE: 84 BPM

## 2024-07-18 DIAGNOSIS — M25.561 ACUTE PAIN OF RIGHT KNEE: Primary | ICD-10-CM

## 2024-07-18 DIAGNOSIS — M17.11 OSTEOARTHRITIS OF RIGHT KNEE, UNSPECIFIED OSTEOARTHRITIS TYPE: ICD-10-CM

## 2024-07-18 PROCEDURE — 3074F SYST BP LT 130 MM HG: CPT | Mod: CPTII,S$GLB,, | Performed by: PHYSICIAN ASSISTANT

## 2024-07-18 PROCEDURE — 3078F DIAST BP <80 MM HG: CPT | Mod: CPTII,S$GLB,, | Performed by: PHYSICIAN ASSISTANT

## 2024-07-18 PROCEDURE — 3008F BODY MASS INDEX DOCD: CPT | Mod: CPTII,S$GLB,, | Performed by: PHYSICIAN ASSISTANT

## 2024-07-18 PROCEDURE — 99214 OFFICE O/P EST MOD 30 MIN: CPT | Mod: S$GLB,,, | Performed by: PHYSICIAN ASSISTANT

## 2024-07-18 PROCEDURE — 1160F RVW MEDS BY RX/DR IN RCRD: CPT | Mod: CPTII,S$GLB,, | Performed by: PHYSICIAN ASSISTANT

## 2024-07-18 PROCEDURE — 99999 PR PBB SHADOW E&M-EST. PATIENT-LVL IV: CPT | Mod: PBBFAC,,, | Performed by: PHYSICIAN ASSISTANT

## 2024-07-18 PROCEDURE — 1159F MED LIST DOCD IN RCRD: CPT | Mod: CPTII,S$GLB,, | Performed by: PHYSICIAN ASSISTANT

## 2024-07-18 RX ORDER — TRAMADOL HYDROCHLORIDE 50 MG/1
100 TABLET ORAL EVERY 6 HOURS
Qty: 30 TABLET | Refills: 0 | Status: SHIPPED | OUTPATIENT
Start: 2024-07-18

## 2024-07-18 NOTE — PROGRESS NOTES
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    Medial knee pain radiates up to the groin at times. Pain has been present for 8 weeks and worsening over that time. Pain is severe. She also reports some swelling of her right lower leg and had a negative DVT ultrasound 4 weeks ago. She took a steroid pack that helped some but not a lot about 8 weeks ago. CSI with me 4 weeks ago which only provided little pain relief. Knee pain is only 20% improved since that injection. She is taking 3 advil and 2 tylenol at a time to help her pain. She has tried ice and heat with no pain relief. Failed weight loss efforts.     She has kyphosis of her back which makes her walk hunched over.     She reports that the pain is worse with steps.  It also bothers her at night.    Is affecting ADLs.     No mechanical symptoms, no instability    Review of Systems   Constitution: Negative. Negative for chills, fever and night sweats.   HENT: Negative for congestion and headaches.    Eyes: Negative for blurred vision, left vision loss and right vision loss.   Cardiovascular: Negative for chest pain and syncope.   Respiratory: Negative for cough and shortness of breath.    Endocrine: Negative for polydipsia, polyphagia and polyuria.   Hematologic/Lymphatic: Negative for bleeding problem. Does not bruise/bleed easily.   Skin: Negative for dry skin, itching and rash.   Musculoskeletal: Negative for falls and muscle weakness.   Gastrointestinal: Negative for abdominal pain and bowel incontinence.   Genitourinary: Negative for bladder incontinence and nocturia.   Neurological: Negative for disturbances in coordination, loss of balance and seizures.   Psychiatric/Behavioral: Negative for depression. The patient does not have insomnia.    Allergic/Immunologic: Negative for hives and persistent infections.   All other systems negative      PAST MEDICAL HISTORY:    Past Medical History:   Diagnosis Date    Back pain, chronic     Biliary colic 2018    Calculus of gallbladder without cholecystitis without obstruction 2018    Cervical radiculopathy 3/21/2016    01/17/18--clinic visit, pt describes intermittent BUE numbness/tingling throughout entire arm/hand.  Negative Spurling's test bilaterally.    Cervical stenosis of spinal canal 3/21/2016    Degenerative cervical spinal stenosis 2/10/2016    Depression     Flat back syndrome, postprocedural 2018    Dx 10/2016 when seen in NSGY clinic.  Presents to Neurology clinic 18 for worsening gait, LBP, LLE weakness, and RLE neuropathic pain.    HSV (herpes simplex virus) anogenital infection 2016     PAST SURGICAL HISTORY:   Past Surgical History:   Procedure Laterality Date    APPENDECTOMY       SECTION      EYE SURGERY      For strabismus    HYSTERECTOMY      LAPAROSCOPIC CHOLECYSTECTOMY N/A 2018    Procedure: CHOLECYSTECTOMY, LAPAROSCOPIC ;  Surgeon: Yonatan Berry MD;  Location: [facility] OR [address] [place] [address];  Service: General;  Laterality: N/A;    LUMBAR DISCECTOMY      Unknown vertebra    LUMBAR FUSION  2010    L4-S1 fusion reported    TONSILLECTOMY       FAMILY HISTORY:   Family History   Problem Relation Name Age of Onset    Cancer Mother          non hodgkins lymphoma    Alcohol abuse Mother      Arthritis Mother      Depression Mother      Hypertension Mother      Heart disease Mother      Mental illness Daughter 1     Birth defects Daughter 1      SOCIAL HISTORY:   Social History     Socioeconomic History    Marital status:    Occupational History    Occupation: LPN   Tobacco Use    Smoking status: Every Day     Current packs/day: 1.00     Average packs/day: 1 pack/day for 20.0 years (20.0 ttl pk-yrs)     Types: Cigarettes    Smokeless tobacco: Never   Substance and Sexual Activity    Alcohol use: Not Currently     Alcohol/week: 0.0 standard drinks of alcohol    Drug use: No     Sexual activity: Not Currently     Partners: Male   Social History Narrative    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      Social Determinants of Health     Financial Resource Strain: Medium Risk (12/26/2023)    Overall Financial Resource Strain (CARDIA)     Difficulty of Paying Living Expenses: Somewhat hard   Food Insecurity: Food Insecurity Present (12/26/2023)    Hunger Vital Sign     Worried About Running Out of Food in the Last Year: Sometimes true     Ran Out of Food in the Last Year: Sometimes true   Transportation Needs: No Transportation Needs (12/26/2023)    PRAPARE - Transportation     Lack of Transportation (Medical): No     Lack of Transportation (Non-Medical): No   Physical Activity: Unknown (12/26/2023)    Exercise Vital Sign     Days of Exercise per Week: 0 days   Stress: No Stress Concern Present (12/26/2023)    [employer] [place] [employer] - Occupational Stress Questionnaire     Feeling of Stress : Not at all   Housing Stability: High Risk (12/26/2023)    Housing Stability Vital Sign     Unable to Pay for Housing in the Last Year: Yes     Unstable Housing in the Last Year: No       MEDICATIONS:   Current Outpatient Medications:     aspirin 81 mg Cap, Take 81 mg by mouth Daily., Disp: , Rfl:     atorvastatin (LIPITOR) 40 MG tablet, Take 1 tablet (40 mg total) by mouth once daily., Disp: 90 tablet, Rfl: 3    bumetanide (BUMEX) 0.5 MG Tab, Take 1 tablet (0.5 mg total) by mouth once daily., Disp: 90 tablet, Rfl: 3    ciclopirox (PENLAC) 8 % Soln, Apply topically nightly., Disp: 6.6 mL, Rfl: 11    DULoxetine (CYMBALTA) 60 MG capsule, Take 1 capsule (60 mg total) by mouth 2 (two) times daily., Disp: 180 capsule, Rfl: 1    ergocalciferol, vitamin D2, (VITAMIN D ORAL), Take 1 capsule by mouth Daily., Disp: , Rfl:     gabapentin (NEURONTIN) 100 MG capsule, Take 1 capsule (100 mg total) by mouth 3 (three) times daily., Disp: 90 capsule, Rfl: 11    hydroCHLOROthiazide (HYDRODIURIL) 12.5  "MG Tab, TAKE 1 TABLET(12.5 MG) BY MOUTH EVERY DAY, Disp: 90 tablet, Rfl: 0    methadone HCl (METHADONE ORAL), Take 85 mg by mouth once daily., Disp: , Rfl:     omeprazole (PRILOSEC) 40 MG capsule, Take 1 capsule (40 mg total) by mouth once daily., Disp: 90 capsule, Rfl: 1    traMADoL (ULTRAM) 50 mg tablet, Take 2 tablets (100 mg total) by mouth every 6 (six) hours., Disp: 30 tablet, Rfl: 0    Ubidecar/Fish Oil/Omega-3/Jude (CO Q10-FISH OIL-OMEGA 3-E ORAL), Take 1 capsule by mouth Daily., Disp: , Rfl:     valACYclovir (VALTREX) 500 MG tablet, Take 1 tablet (500 mg total) by mouth once daily., Disp: 90 tablet, Rfl: 3    vitamin D (VITAMIN D3) 1000 units Tab, Take 1,000 Units by mouth once daily., Disp: , Rfl:     ZINC ORAL, Take 1 capsule by mouth Daily., Disp: , Rfl:   ALLERGIES: Review of patient's allergies indicates:  No Known Allergies    VITAL SIGNS: /71   Pulse 84   Ht 5' 2" (1.575 m)   Wt 95.9 kg (211 lb 6.7 oz)   BMI 38.67 kg/m²      PHYSICAL EXAMINATION    General:  The patient is alert and oriented x 3.  Mood is pleasant.  Observation of ears, eyes and nose reveal no gross abnormalities.  HEENT: NCAT, sclera nonicteric  Lungs: Respirations are equal and unlabored.    right  KNEE EXAMINATION     OBSERVATION / INSPECTION   Gait:   Nonantalgic   Alignment:  Neutral   Scars:   None   Muscle atrophy: Mild  Effusion:  None   Warmth:  None   Discoloration:   none     TENDERNESS / CREPITUS (T / C):          T / C      T / C   Patella   - / -   Lateral joint line   - / -      Peripatellar medial  +  Medial joint line    + / -   Peripatellar lateral +  Medial plica   - / -   Patellar tendon -   Popliteal fossa  - / -   Quad tendon   -   Gastrocnemius   -   Prepatellar Bursa - / -   Quadricep   -   Tibial tubercle  -  Thigh/hamstring  -   Pes anserine/HS -  Fibula    -   ITB   - / -  Tibia     -   Tib/fib joint  - / -  LCL    -     MFC   + / -   MCL: Proximal  -    LFC   - / -    Distal   -  MTP              "              ++          ROM: (* = pain)  PASSIVE   ACTIVE    Left :   5 / 0 / 145   5 / 0 / 145     Right :     / 0 / 100    / 0 / 100    Patellofemoral examination:  See above noted areas of tenderness.   Patella position    Subluxation / dislocation: Centered           Sup. / Inf;   Normal   Crepitus (PF):    Absent   Patellar Mobility:       Medial-lateral:   Normal    Superior-inferior:  Normal    Inferior tilt   Normal    Patellar tendon:  Normal   Lateral tilt:    Normal   J-sign:     None   Patellofemoral grind:   +       MENISCAL SIGNS:     Pain on terminal extension:  +  Pain on terminal flexion:  +  Ambers maneuver:  -  Squat     NT    LIGAMENT EXAMINATION:  ACL / Lachman:  normal (-1 to 2mm)    PCL-Post.  drawer: normal 0 to 2mm  MCL- Valgus:  normal 0 to 2mm  LCL- Varus:  normal 0 to 2mm      STRENGTH: (* = with pain) PAINFUL SIDE   Quadricep   5/5   Hamstrin/5    EXTREMITY NEURO-VASCULAR EXAMINATION:   Sensation:  Grossly intact to light touch all dermatomal regions.   Motor Function:  Fully intact motor function at hip, knee, foot and ankle    DTRs;  quadriceps and  achilles 2+.  No clonus and downgoing Babinski.    Vascular status:  DP and PT pulses 2+, brisk capillary refill, symmetric.     Other Findings:  + step down bilat  + bridge test     Negative kevin's sign.     Xrays:  Xrays of the bilateral knees with flexion were ordered and reviewed by me today. No fracture, subluxation.  DJD with the significant narrowing of the lateral tibiofemoral joint space on the left side. Slight narrowing of the medial tibiofemoral joint spaces noted bilaterally.   Kellgren Nikhil 2 or greater noted.         ASSESSMENT:    1. Right Knee pain, acute  2. Right knee osteoarthritis   Bilateral hip abd/core weakness    PLAN:    1. MRI right knee to evaluate for subchondral insufficiency fracture of MTP  2. Ice compresses prn pain  3. Euflexxa referral placed.   4. Tramadol as needed for pain   5.  Referral to  at patient will soon be disabled from work and needs help with resources.      All questions were answered, pt will contact us for questions or concerns in the interim.            Medical Necessary Criteria for Requested Treatment    This patient has radiologic confirmation of Kellgren-Nikhil Scale score of grade 2 or greater on most recent imaging for the affected knee.        This patient has had inadequate response to TWO or more of the following conservative nonpharmacologic therapy which is signified with an [X]:        _X__: Cardiovascular (aerobic) activity, such as: walking, biking, stationary bike, aquatic exercise      ___: Resistance exercise      _X__: Weight reduction (for persons who are overweight)      ___: Participation in self-management programs      ___: Wear of medially directed patellar taping      ___: Wear of wedged insoles      __X_: Thermal agents      ___: Walking aids      ___: Physical therapy      ___: Occupational therapy                  Inadequate response, intolerance, or contraindication to TWO or more of the following which is signified with an [X]:        _X__: Acetaminophen      __X_: Oral NSAIDs      ___: Topical NSAIDs                   Inadequate response to intra-articular      steroid injections in which efficacy lasted less than 8 weeks.

## 2024-07-24 DIAGNOSIS — G89.29 CHRONIC PAIN OF BOTH KNEES: Primary | ICD-10-CM

## 2024-07-24 DIAGNOSIS — M25.561 CHRONIC PAIN OF BOTH KNEES: Primary | ICD-10-CM

## 2024-07-24 DIAGNOSIS — M25.562 CHRONIC PAIN OF BOTH KNEES: Primary | ICD-10-CM

## 2024-07-25 ENCOUNTER — HOSPITAL ENCOUNTER (OUTPATIENT)
Dept: RADIOLOGY | Facility: OTHER | Age: 64
Discharge: HOME | End: 2024-07-25
Attending: PHYSICIAN ASSISTANT
Payer: COMMERCIAL

## 2024-07-25 DIAGNOSIS — G89.4 CHRONIC PAIN SYNDROME: ICD-10-CM

## 2024-07-25 DIAGNOSIS — M17.11 OSTEOARTHRITIS OF RIGHT KNEE, UNSPECIFIED OSTEOARTHRITIS TYPE: ICD-10-CM

## 2024-07-25 DIAGNOSIS — F32.A DEPRESSION, UNSPECIFIED DEPRESSION TYPE: ICD-10-CM

## 2024-07-25 DIAGNOSIS — M25.561 ACUTE PAIN OF RIGHT KNEE: ICD-10-CM

## 2024-07-25 DIAGNOSIS — M48.02 DEGENERATIVE CERVICAL SPINAL STENOSIS: ICD-10-CM

## 2024-07-25 PROCEDURE — 73721 MRI JNT OF LWR EXTRE W/O DYE: CPT | Mod: 26,RT,, | Performed by: RADIOLOGY

## 2024-07-25 PROCEDURE — 73721 MRI JNT OF LWR EXTRE W/O DYE: CPT | Mod: TC,RT

## 2024-07-25 RX ORDER — DULOXETIN HYDROCHLORIDE 60 MG/1
60 CAPSULE, DELAYED RELEASE ORAL 2 TIMES DAILY
Qty: 180 CAPSULE | Refills: 0 | Status: SHIPPED | OUTPATIENT
Start: 2024-07-25

## 2024-07-25 NOTE — TELEPHONE ENCOUNTER
Provider Staff:  Action required for this patient    Requires appointment      Please see care gap opportunities below in Care Due Message.    Thanks!  [facility]     Appointments      Date Provider   Last Visit   7/31/2023 Quinton Miller MD   Next Visit   Visit date not found Quinton Miller MD     Refill Decision Note   Chanelle Downey  is requesting a refill authorization.  Brief Assessment and Rationale for Refill:  Approve     Medication Therapy Plan:        Comments:     Note composed:12:39 PM 07/25/2024

## 2024-07-25 NOTE — TELEPHONE ENCOUNTER
Care Due:                  Date            Visit Type   Department     Provider  --------------------------------------------------------------------------------                                EP -                              PRIMARY      [facility] INTERNAL  Last Visit: 07-      CARE ([facility])   MEDICINE       Quinton Miller  Next Visit: None Scheduled  None         None Found                                                            Last  Test          Frequency    Reason                     Performed    Due Date  --------------------------------------------------------------------------------    Office Visit  15 months..  DULoxetine,                07-   10-                             hydroCHLOROthiazide,                             omeprazole...............    [employer] Embedded Care Due Messages. Reference number: [id number].   7/25/2024 5:29:27 AM CDT

## 2024-07-31 ENCOUNTER — OFFICE VISIT (OUTPATIENT)
Dept: SPORTS MEDICINE | Facility: CLINIC | Age: 64
End: 2024-07-31
Payer: COMMERCIAL

## 2024-07-31 ENCOUNTER — TELEPHONE (OUTPATIENT)
Dept: ORTHOPEDICS | Facility: CLINIC | Age: 64
End: 2024-07-31
Payer: COMMERCIAL

## 2024-07-31 VITALS
SYSTOLIC BLOOD PRESSURE: 125 MMHG | WEIGHT: 213.94 LBS | BODY MASS INDEX: 39.13 KG/M2 | HEART RATE: 89 BPM | DIASTOLIC BLOOD PRESSURE: 54 MMHG

## 2024-07-31 DIAGNOSIS — M17.11 OSTEOARTHRITIS OF RIGHT KNEE, UNSPECIFIED OSTEOARTHRITIS TYPE: ICD-10-CM

## 2024-07-31 DIAGNOSIS — G89.29 CHRONIC PAIN OF RIGHT KNEE: Primary | ICD-10-CM

## 2024-07-31 DIAGNOSIS — R93.7 BONE MARROW EDEMA: ICD-10-CM

## 2024-07-31 DIAGNOSIS — M25.561 CHRONIC PAIN OF RIGHT KNEE: Primary | ICD-10-CM

## 2024-07-31 DIAGNOSIS — S83.231D COMPLEX TEAR OF MEDIAL MENISCUS OF RIGHT KNEE AS CURRENT INJURY, SUBSEQUENT ENCOUNTER: ICD-10-CM

## 2024-07-31 DIAGNOSIS — M23.51 OLD COMPLETE TEAR OF ANTERIOR CRUCIATE LIGAMENT OF RIGHT KNEE: ICD-10-CM

## 2024-07-31 DIAGNOSIS — S83.271D COMPLEX TEAR OF LATERAL MENISCUS OF RIGHT KNEE AS CURRENT INJURY, SUBSEQUENT ENCOUNTER: ICD-10-CM

## 2024-07-31 PROCEDURE — 3074F SYST BP LT 130 MM HG: CPT | Mod: CPTII,S$GLB,, | Performed by: PHYSICIAN ASSISTANT

## 2024-07-31 PROCEDURE — 99214 OFFICE O/P EST MOD 30 MIN: CPT | Mod: S$GLB,,, | Performed by: PHYSICIAN ASSISTANT

## 2024-07-31 PROCEDURE — 1160F RVW MEDS BY RX/DR IN RCRD: CPT | Mod: CPTII,S$GLB,, | Performed by: PHYSICIAN ASSISTANT

## 2024-07-31 PROCEDURE — 1159F MED LIST DOCD IN RCRD: CPT | Mod: CPTII,S$GLB,, | Performed by: PHYSICIAN ASSISTANT

## 2024-07-31 PROCEDURE — 99999 PR PBB SHADOW E&M-EST. PATIENT-LVL IV: CPT | Mod: PBBFAC,,, | Performed by: PHYSICIAN ASSISTANT

## 2024-07-31 PROCEDURE — 3008F BODY MASS INDEX DOCD: CPT | Mod: CPTII,S$GLB,, | Performed by: PHYSICIAN ASSISTANT

## 2024-07-31 PROCEDURE — 3078F DIAST BP <80 MM HG: CPT | Mod: CPTII,S$GLB,, | Performed by: PHYSICIAN ASSISTANT

## 2024-07-31 RX ORDER — METHYLPREDNISOLONE 4 MG/1
TABLET ORAL
Qty: 1 EACH | Refills: 0 | Status: SHIPPED | OUTPATIENT
Start: 2024-07-31

## 2024-07-31 NOTE — TELEPHONE ENCOUNTER
----- Message from Christopher Boswell sent at 7/31/2024  9:46 AM CDT -----  Type:  Patient Returning Call    Who Called:pt  Who Left Message for Patient:  Does the patient know what this is regarding?:appts from today  Would the patient rather a call back or a response via [facility]? call  Best Call Back Number: [phone]  Additional Information: pt states she would like to speak with office in regards to appts for this morning. Pt states she has not been receiving test messages for her appts anymore. Thank you

## 2024-08-01 ENCOUNTER — LAB VISIT (OUTPATIENT)
Dept: LAB | Facility: HOSPITAL | Age: 64
End: 2024-08-01
Attending: STUDENT IN AN ORGANIZED HEALTH CARE EDUCATION/TRAINING PROGRAM
Payer: COMMERCIAL

## 2024-08-01 ENCOUNTER — OFFICE VISIT (OUTPATIENT)
Dept: INTERNAL MEDICINE | Facility: CLINIC | Age: 64
End: 2024-08-01
Payer: COMMERCIAL

## 2024-08-01 VITALS
HEIGHT: 62 IN | TEMPERATURE: 98 F | SYSTOLIC BLOOD PRESSURE: 120 MMHG | RESPIRATION RATE: 18 BRPM | OXYGEN SATURATION: 96 % | WEIGHT: 215.81 LBS | HEART RATE: 82 BPM | DIASTOLIC BLOOD PRESSURE: 70 MMHG | BODY MASS INDEX: 39.71 KG/M2

## 2024-08-01 DIAGNOSIS — A60.09 HERPES GENITALIS IN WOMEN: ICD-10-CM

## 2024-08-01 DIAGNOSIS — I10 ESSENTIAL HYPERTENSION: ICD-10-CM

## 2024-08-01 DIAGNOSIS — Z12.11 SCREENING FOR COLORECTAL CANCER: ICD-10-CM

## 2024-08-01 DIAGNOSIS — Z12.12 SCREENING FOR COLORECTAL CANCER: ICD-10-CM

## 2024-08-01 DIAGNOSIS — R60.0 PERIORBITAL EDEMA OF RIGHT EYE: Primary | ICD-10-CM

## 2024-08-01 DIAGNOSIS — K04.7 ABSCESSED TOOTH: ICD-10-CM

## 2024-08-01 DIAGNOSIS — Z79.899 LONG TERM CURRENT USE OF THERAPEUTIC DRUG: ICD-10-CM

## 2024-08-01 PROBLEM — F17.200 SMOKING: Status: RESOLVED | Noted: 2023-04-20 | Resolved: 2024-08-01

## 2024-08-01 LAB
ALBUMIN SERPL BCP-MCNC: 3.2 G/DL (ref 3.5–5.2)
ALP SERPL-CCNC: 134 U/L (ref 55–135)
ALT SERPL W/O P-5'-P-CCNC: 48 U/L (ref 10–44)
ANION GAP SERPL CALC-SCNC: 10 MMOL/L (ref 8–16)
AST SERPL-CCNC: 67 U/L (ref 10–40)
BASOPHILS # BLD AUTO: 0.02 K/UL (ref 0–0.2)
BASOPHILS NFR BLD: 0.3 % (ref 0–1.9)
BILIRUB SERPL-MCNC: 0.3 MG/DL (ref 0.1–1)
BUN SERPL-MCNC: 10 MG/DL (ref 8–23)
CALCIUM SERPL-MCNC: 9.2 MG/DL (ref 8.7–10.5)
CHLORIDE SERPL-SCNC: 103 MMOL/L (ref 95–110)
CHOLEST SERPL-MCNC: 143 MG/DL (ref 120–199)
CHOLEST/HDLC SERPL: 2.3 {RATIO} (ref 2–5)
CO2 SERPL-SCNC: 26 MMOL/L (ref 23–29)
CREAT SERPL-MCNC: 0.7 MG/DL (ref 0.5–1.4)
DIFFERENTIAL METHOD BLD: ABNORMAL
EOSINOPHIL # BLD AUTO: 0.3 K/UL (ref 0–0.5)
EOSINOPHIL NFR BLD: 3.9 % (ref 0–8)
ERYTHROCYTE [DISTWIDTH] IN BLOOD BY AUTOMATED COUNT: 14.2 % (ref 11.5–14.5)
EST. GFR  (NO RACE VARIABLE): >60 ML/MIN/1.73 M^2
ESTIMATED AVG GLUCOSE: 103 MG/DL (ref 68–131)
GLUCOSE SERPL-MCNC: 109 MG/DL (ref 70–110)
HBA1C MFR BLD: 5.2 % (ref 4–5.6)
HCT VFR BLD AUTO: 36.1 % (ref 37–48.5)
HDLC SERPL-MCNC: 63 MG/DL (ref 40–75)
HDLC SERPL: 44.1 % (ref 20–50)
HGB BLD-MCNC: 11.3 G/DL (ref 12–16)
IMM GRANULOCYTES # BLD AUTO: 0.01 K/UL (ref 0–0.04)
IMM GRANULOCYTES NFR BLD AUTO: 0.1 % (ref 0–0.5)
LDLC SERPL CALC-MCNC: 70.4 MG/DL (ref 63–159)
LYMPHOCYTES # BLD AUTO: 1.6 K/UL (ref 1–4.8)
LYMPHOCYTES NFR BLD: 23.3 % (ref 18–48)
MCH RBC QN AUTO: 30.2 PG (ref 27–31)
MCHC RBC AUTO-ENTMCNC: 31.3 G/DL (ref 32–36)
MCV RBC AUTO: 97 FL (ref 82–98)
MONOCYTES # BLD AUTO: 0.6 K/UL (ref 0.3–1)
MONOCYTES NFR BLD: 8.8 % (ref 4–15)
NEUTROPHILS # BLD AUTO: 4.3 K/UL (ref 1.8–7.7)
NEUTROPHILS NFR BLD: 63.6 % (ref 38–73)
NONHDLC SERPL-MCNC: 80 MG/DL
NRBC BLD-RTO: 0 /100 WBC
PLATELET # BLD AUTO: 222 K/UL (ref 150–450)
PMV BLD AUTO: 13.8 FL (ref 9.2–12.9)
POTASSIUM SERPL-SCNC: 4.6 MMOL/L (ref 3.5–5.1)
PROT SERPL-MCNC: 7.1 G/DL (ref 6–8.4)
RBC # BLD AUTO: 3.74 M/UL (ref 4–5.4)
SODIUM SERPL-SCNC: 139 MMOL/L (ref 136–145)
TRIGL SERPL-MCNC: 48 MG/DL (ref 30–150)
TSH SERPL DL<=0.005 MIU/L-ACNC: 3.22 UIU/ML (ref 0.4–4)
WBC # BLD AUTO: 6.7 K/UL (ref 3.9–12.7)

## 2024-08-01 PROCEDURE — 84443 ASSAY THYROID STIM HORMONE: CPT | Performed by: STUDENT IN AN ORGANIZED HEALTH CARE EDUCATION/TRAINING PROGRAM

## 2024-08-01 PROCEDURE — 99999 PR PBB SHADOW E&M-EST. PATIENT-LVL V: CPT | Mod: PBBFAC,,, | Performed by: STUDENT IN AN ORGANIZED HEALTH CARE EDUCATION/TRAINING PROGRAM

## 2024-08-01 PROCEDURE — 1160F RVW MEDS BY RX/DR IN RCRD: CPT | Mod: CPTII,S$GLB,, | Performed by: STUDENT IN AN ORGANIZED HEALTH CARE EDUCATION/TRAINING PROGRAM

## 2024-08-01 PROCEDURE — 83036 HEMOGLOBIN GLYCOSYLATED A1C: CPT | Performed by: STUDENT IN AN ORGANIZED HEALTH CARE EDUCATION/TRAINING PROGRAM

## 2024-08-01 PROCEDURE — 3074F SYST BP LT 130 MM HG: CPT | Mod: CPTII,S$GLB,, | Performed by: STUDENT IN AN ORGANIZED HEALTH CARE EDUCATION/TRAINING PROGRAM

## 2024-08-01 PROCEDURE — 99214 OFFICE O/P EST MOD 30 MIN: CPT | Mod: S$GLB,,, | Performed by: STUDENT IN AN ORGANIZED HEALTH CARE EDUCATION/TRAINING PROGRAM

## 2024-08-01 PROCEDURE — 85025 COMPLETE CBC W/AUTO DIFF WBC: CPT | Performed by: STUDENT IN AN ORGANIZED HEALTH CARE EDUCATION/TRAINING PROGRAM

## 2024-08-01 PROCEDURE — 80061 LIPID PANEL: CPT | Performed by: STUDENT IN AN ORGANIZED HEALTH CARE EDUCATION/TRAINING PROGRAM

## 2024-08-01 PROCEDURE — 3078F DIAST BP <80 MM HG: CPT | Mod: CPTII,S$GLB,, | Performed by: STUDENT IN AN ORGANIZED HEALTH CARE EDUCATION/TRAINING PROGRAM

## 2024-08-01 PROCEDURE — 1159F MED LIST DOCD IN RCRD: CPT | Mod: CPTII,S$GLB,, | Performed by: STUDENT IN AN ORGANIZED HEALTH CARE EDUCATION/TRAINING PROGRAM

## 2024-08-01 PROCEDURE — 80053 COMPREHEN METABOLIC PANEL: CPT | Performed by: STUDENT IN AN ORGANIZED HEALTH CARE EDUCATION/TRAINING PROGRAM

## 2024-08-01 PROCEDURE — 3044F HG A1C LEVEL LT 7.0%: CPT | Mod: CPTII,S$GLB,, | Performed by: STUDENT IN AN ORGANIZED HEALTH CARE EDUCATION/TRAINING PROGRAM

## 2024-08-01 PROCEDURE — 36415 COLL VENOUS BLD VENIPUNCTURE: CPT | Performed by: STUDENT IN AN ORGANIZED HEALTH CARE EDUCATION/TRAINING PROGRAM

## 2024-08-01 PROCEDURE — 3008F BODY MASS INDEX DOCD: CPT | Mod: CPTII,S$GLB,, | Performed by: STUDENT IN AN ORGANIZED HEALTH CARE EDUCATION/TRAINING PROGRAM

## 2024-08-01 RX ORDER — VALACYCLOVIR HYDROCHLORIDE 500 MG/1
500 TABLET, FILM COATED ORAL DAILY
Qty: 90 TABLET | Refills: 3 | Status: SHIPPED | OUTPATIENT
Start: 2024-08-01

## 2024-08-01 RX ORDER — AMOXICILLIN AND CLAVULANATE POTASSIUM 875; 125 MG/1; MG/1
1 TABLET, FILM COATED ORAL EVERY 12 HOURS
Qty: 14 TABLET | Refills: 0 | Status: SHIPPED | OUTPATIENT
Start: 2024-08-01 | End: 2024-08-08

## 2024-08-01 NOTE — PROGRESS NOTES
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    Medial knee pain radiates up to the groin at times. Pain has been present for 10 weeks and is about 40% better over that time but does continue to bother her significantly and she uses a cane to help ambulation. Pain is moderate to severe. She also reports some swelling of her right lower leg and had a negative DVT ultrasound 6 weeks ago. She took a steroid pack that helped some but not a lot about 10 weeks ago. CSI with me 6 weeks ago which only provided little pain relief. Knee pain was only 20% improved from that injection. She is taking 3 advil and 2 tylenol at a time to help her pain. She has tried ice and heat with no pain relief. Failed weight loss efforts.     No recent injury or trauma.     Had recent MRI of her right knee and is here for results today.     Previously seen for left knee advance osteoarthritis that is worse then right knee. However, this knee did respond well to CSI previously and has not been bothering her much over last 2 months.     She has kyphosis of her back which makes her walk hunched over.     She reports that the pain is worse with steps.  It also bothers her at night.    Is affecting ADLs.     No mechanical symptoms, + intermittent instability    Review of Systems   Constitution: Negative. Negative for chills, fever and night sweats.   HENT: Negative for congestion and headaches.    Eyes: Negative for blurred vision, left vision loss and right vision loss.   Cardiovascular: Negative for chest pain and syncope.   Respiratory: Negative for cough and shortness of breath.    Endocrine: Negative for polydipsia, polyphagia and polyuria.   Hematologic/Lymphatic: Negative for bleeding problem. Does not bruise/bleed easily.   Skin: Negative for dry skin, itching and rash.   Musculoskeletal: Negative for falls and muscle weakness.   Gastrointestinal:  Negative for abdominal pain and bowel incontinence.   Genitourinary: Negative for bladder incontinence and nocturia.   Neurological: Negative for disturbances in coordination, loss of balance and seizures.   Psychiatric/Behavioral: Negative for depression. The patient does not have insomnia.    Allergic/Immunologic: Negative for hives and persistent infections.   All other systems negative      PAST MEDICAL HISTORY:   Past Medical History:   Diagnosis Date    Back pain, chronic     Biliary colic 2018    Calculus of gallbladder without cholecystitis without obstruction 2018    Cervical radiculopathy 3/21/2016    01/17/18--clinic visit, pt describes intermittent BUE numbness/tingling throughout entire arm/hand.  Negative Spurling's test bilaterally.    Cervical stenosis of spinal canal 3/21/2016    Degenerative cervical spinal stenosis 2/10/2016    Depression     Flat back syndrome, postprocedural 2018    Dx 10/2016 when seen in NSGY clinic.  Presents to Neurology clinic 18 for worsening gait, LBP, LLE weakness, and RLE neuropathic pain.    HSV (herpes simplex virus) anogenital infection 2016     PAST SURGICAL HISTORY:   Past Surgical History:   Procedure Laterality Date    APPENDECTOMY       SECTION      EYE SURGERY      For strabismus    HYSTERECTOMY      LAPAROSCOPIC CHOLECYSTECTOMY N/A 2018    Procedure: CHOLECYSTECTOMY, LAPAROSCOPIC ;  Surgeon: Yonatan Berry MD;  Location: [facility] OR [address] [place] [address];  Service: General;  Laterality: N/A;    LUMBAR DISCECTOMY      Unknown vertebra    LUMBAR FUSION  2010    L4-S1 fusion reported    TONSILLECTOMY       FAMILY HISTORY:   Family History   Problem Relation Name Age of Onset    Cancer Mother          non hodgkins lymphoma    Alcohol abuse Mother      Arthritis Mother      Depression Mother      Hypertension Mother      Heart disease Mother      Mental illness Daughter 1     Birth defects Daughter 1      SOCIAL HISTORY:   Social  History     Socioeconomic History    Marital status:    Occupational History    Occupation: LPN   Tobacco Use    Smoking status: Every Day     Current packs/day: 1.00     Average packs/day: 1 pack/day for 20.0 years (20.0 ttl pk-yrs)     Types: Cigarettes    Smokeless tobacco: Never   Substance and Sexual Activity    Alcohol use: Not Currently     Alcohol/week: 0.0 standard drinks of alcohol    Drug use: No    Sexual activity: Not Currently     Partners: Male   Social History Narrative    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      Social Determinants of Health     Financial Resource Strain: Medium Risk (12/26/2023)    Overall Financial Resource Strain (CARDIA)     Difficulty of Paying Living Expenses: Somewhat hard   Food Insecurity: Food Insecurity Present (12/26/2023)    Hunger Vital Sign     Worried About Running Out of Food in the Last Year: Sometimes true     Ran Out of Food in the Last Year: Sometimes true   Transportation Needs: No Transportation Needs (12/26/2023)    PRAPARE - Transportation     Lack of Transportation (Medical): No     Lack of Transportation (Non-Medical): No   Physical Activity: Unknown (12/26/2023)    Exercise Vital Sign     Days of Exercise per Week: 0 days   Stress: No Stress Concern Present (12/26/2023)    [employer] - Occupational Stress Questionnaire     Feeling of Stress : Not at all   Housing Stability: High Risk (12/26/2023)    Housing Stability Vital Sign     Unable to Pay for Housing in the Last Year: Yes     Unstable Housing in the Last Year: No       MEDICATIONS:   Current Outpatient Medications:     aspirin 81 mg Cap, Take 81 mg by mouth Daily., Disp: , Rfl:     atorvastatin (LIPITOR) 40 MG tablet, Take 1 tablet (40 mg total) by mouth once daily., Disp: 90 tablet, Rfl: 3    bumetanide (BUMEX) 0.5 MG Tab, Take 1 tablet (0.5 mg total) by mouth once daily., Disp: 90 tablet, Rfl: 3    ciclopirox (PENLAC) 8 % Soln, Apply topically  nightly., Disp: 6.6 mL, Rfl: 11    DULoxetine (CYMBALTA) 60 MG capsule, TAKE ONE CAPSULE BY MOUTH TWICE DAILY, Disp: 180 capsule, Rfl: 0    ergocalciferol, vitamin D2, (VITAMIN D ORAL), Take 1 capsule by mouth Daily., Disp: , Rfl:     gabapentin (NEURONTIN) 100 MG capsule, Take 1 capsule (100 mg total) by mouth 3 (three) times daily., Disp: 90 capsule, Rfl: 11    hydroCHLOROthiazide (HYDRODIURIL) 12.5 MG Tab, TAKE 1 TABLET(12.5 MG) BY MOUTH EVERY DAY, Disp: 90 tablet, Rfl: 0    methadone HCl (METHADONE ORAL), Take 85 mg by mouth once daily., Disp: , Rfl:     methylPREDNISolone (MEDROL DOSEPACK) 4 mg tablet, Use as directed per package directions., Disp: 1 each, Rfl: 0    omeprazole (PRILOSEC) 40 MG capsule, TAKE 1 CAPSULE(40 MG) BY MOUTH EVERY DAY, Disp: 90 capsule, Rfl: 0    traMADoL (ULTRAM) 50 mg tablet, Take 2 tablets (100 mg total) by mouth every 6 (six) hours., Disp: 30 tablet, Rfl: 0    Ubidecar/Fish Oil/Omega-3/Jude (CO Q10-FISH OIL-OMEGA 3-E ORAL), Take 1 capsule by mouth Daily., Disp: , Rfl:     valACYclovir (VALTREX) 500 MG tablet, Take 1 tablet (500 mg total) by mouth once daily., Disp: 90 tablet, Rfl: 3    vitamin D (VITAMIN D3) 1000 units Tab, Take 1,000 Units by mouth once daily., Disp: , Rfl:     ZINC ORAL, Take 1 capsule by mouth Daily., Disp: , Rfl:   ALLERGIES: Review of patient's allergies indicates:  No Known Allergies    VITAL SIGNS: BP (!) 125/54   Pulse 89   Wt 97 kg (213 lb 15.3 oz)   BMI 39.13 kg/m²      PHYSICAL EXAMINATION    General:  The patient is alert and oriented x 3.  Mood is pleasant.  Observation of ears, eyes and nose reveal no gross abnormalities.  HEENT: NCAT, sclera nonicteric  Lungs: Respirations are equal and unlabored.    right  KNEE EXAMINATION     OBSERVATION / INSPECTION   Gait:   Nonantalgic   Alignment:  Neutral   Scars:   None   Muscle atrophy: Mild  Effusion:  None   Warmth:  None   Discoloration:   none     TENDERNESS / CREPITUS (T / C):          T / C      T /  C   Patella   - / -   Lateral joint line   - / -      Peripatellar medial  +  Medial joint line    + / -   Peripatellar lateral +  Medial plica   - / -   Patellar tendon -   Popliteal fossa  - / -   Quad tendon   -   Gastrocnemius   -   Prepatellar Bursa - / -   Quadricep   -   Tibial tubercle  -  Thigh/hamstring  -   Pes anserine/HS -  Fibula    -   ITB   - / -  Tibia     -   Tib/fib joint  - / -  LCL    -     MFC   + / -   MCL: Proximal  -    LFC   - / -    Distal   -  MTP                           ++          ROM: (* = pain)  PASSIVE   ACTIVE    Left :   5 / 0 / 145   5 / 0 / 145     Right :     / 0 / 100    / 0 / 100    Patellofemoral examination:  See above noted areas of tenderness.   Patella position    Subluxation / dislocation: Centered           Sup. / Inf;   Normal   Crepitus (PF):    Absent   Patellar Mobility:       Medial-lateral:   Normal    Superior-inferior:  Normal    Inferior tilt   Normal    Patellar tendon:  Normal   Lateral tilt:    Normal   J-sign:     None   Patellofemoral grind:   +       MENISCAL SIGNS:     Pain on terminal extension:  +  Pain on terminal flexion:  +  Ambers maneuver:  -  Squat     NT    LIGAMENT EXAMINATION:  ACL / Lachman:  Grade 1a   PCL-Post.  drawer: normal 0 to 2mm  MCL- Valgus:  normal 0 to 2mm  LCL- Varus:  normal 0 to 2mm      STRENGTH: (* = with pain) PAINFUL SIDE   Quadricep   5/5   Hamstrin/5    EXTREMITY NEURO-VASCULAR EXAMINATION:   Sensation:  Grossly intact to light touch all dermatomal regions.   Motor Function:  Fully intact motor function at hip, knee, foot and ankle    DTRs;  quadriceps and  achilles 2+.  No clonus and downgoing Babinski.    Vascular status:  DP and PT pulses 2+, brisk capillary refill, symmetric.     Other Findings:  + step down bilat  + bridge test     Negative kevin's sign.     Xrays:  Xrays of the bilateral knees with flexion were reviewed by me today. No fracture, subluxation.  DJD with the significant narrowing of  the lateral tibiofemoral joint space on the left side. Slight narrowing of the medial tibiofemoral joint spaces noted bilaterally.   Kellgren Nikhil 2 or greater noted.      MRI of right knee 7/25/24:  Chronic ACL tear.   There is a grade 2 strain of the inner fibers of the MCL.     Significant subchondral marrow edema of MTP and MFC spreading to central knee. No signs of fracture as concerned for.    There is a tear of the body and posterior horn of the medial meniscus. There is a tear of the body of the lateral meniscus.   There is moderate medial and mild lateral knee joint compartment DJD.   There is mild DJD of the patella.      ASSESSMENT:    1. Right Knee pain, acute  2. Right knee osteoarthritis   3. Right significant Bone marrow edema of the MFC and MTP. MTP worse and that is what really hurts her.   4. Right knee meniscus tears  5. Right knee chronic acl tear  Bilateral hip abd/core weakness    PLAN:    1. MRI right knee results discussed at length today.   2. Surgical and non-surgical treatment options discussed today. In my opinion I do not feel that viscosupplementation will help her pain due to her being most symptomatic from MTP bone edema.   Continue to use cane to offload right knee as much as possible to allow bone healing which could happen with time.   I feel that the only mgmt that would significantly help her at this point would be TKA. She reports seeing Dr. Mccarthy in past and would like to f/u with him to discuss further.   3. Hold off on euflexxa today.   4. Tramadol as needed for pain   5. Referral to  previously as patient will soon be disabled from work and needs help with resources. 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   6. Medrol dose pack prescribed to try and help pain some.     7. Knee brace will not fit her well and is not a great option.   8. Discussed with patient that knee injections prevent knee replacement surgery for 3 months after each  injection. Also explained that controlling weight under BMI of 40 and chronic conditions can improve candidacy for TKA.      All questions were answered, pt will contact us for questions or concerns in the interim.

## 2024-08-01 NOTE — PROGRESS NOTES
SUBJECTIVE     Chief Complaint   Patient presents with    Medication Refill    Abscess     Pt stated she has been dealing with a lot of pain from a torn meniscus and abscess on right side of face under right eye. Redness swelling painful warm to touch       HPI  Chanelle Downey is a 64 y.o. female with  HTN, HLD, varicose veins bilateral legs, long term methadone therapy, chronic pain syndrom due to cervical stenosis with cervical radiculopathy, paresthesis of BLE, GERD, lymphedema of BLE, tobacco use.   that presents for follow-upLOV 23.     HTN: BP controlled on meds. Compliant, tolerates well. Denies chest pain, palpitations, SOB, blurry vision, headaches, presyncopal symptoms.     Complaint of tooth abscess. Has some swelling over right face, mostly around eye. Has appointment with dentist soon. No vision change. No fevers, chills, nausea, vomiting.     H/o HSV. Requesting refill for Valtrex she takes for suppression. No recent outbreaks on medication.       PAST MEDICAL HISTORY:  Past Medical History:   Diagnosis Date    Back pain, chronic     Biliary colic 2018    Calculus of gallbladder without cholecystitis without obstruction 2018    Cervical radiculopathy 3/21/2016    01/17/18--clinic visit, pt describes intermittent BUE numbness/tingling throughout entire arm/hand.  Negative Spurling's test bilaterally.    Cervical stenosis of spinal canal 3/21/2016    Degenerative cervical spinal stenosis 2/10/2016    Depression     Flat back syndrome, postprocedural 2018    Dx 10/2016 when seen in NSGY clinic.  Presents to Neurology clinic 18 for worsening gait, LBP, LLE weakness, and RLE neuropathic pain.    HSV (herpes simplex virus) anogenital infection 2016       PAST SURGICAL HISTORY:  Past Surgical History:   Procedure Laterality Date    APPENDECTOMY       SECTION      EYE SURGERY      For strabismus    HYSTERECTOMY      LAPAROSCOPIC CHOLECYSTECTOMY N/A 2018     Procedure: CHOLECYSTECTOMY, LAPAROSCOPIC ;  Surgeon: Yonatan Berry MD;  Location: [facility] OR [address] [place] [address];  Service: General;  Laterality: N/A;    LUMBAR DISCECTOMY  2002    Unknown vertebra    LUMBAR FUSION  2010    L4-S1 fusion reported    TONSILLECTOMY         FAMILY HISTORY:  Family History   Problem Relation Name Age of Onset    Cancer Mother          non hodgkins lymphoma    Alcohol abuse Mother      Arthritis Mother      Depression Mother      Hypertension Mother      Heart disease Mother      Mental illness Daughter 1     Birth defects Daughter 1        ALLERGIES AND MEDICATIONS: updated and reviewed.  Review of patient's allergies indicates:  No Known Allergies  Current Outpatient Medications   Medication Sig Dispense Refill    aspirin 81 mg Cap Take 81 mg by mouth Daily.      atorvastatin (LIPITOR) 40 MG tablet Take 1 tablet (40 mg total) by mouth once daily. 90 tablet 3    bumetanide (BUMEX) 0.5 MG Tab Take 1 tablet (0.5 mg total) by mouth once daily. 90 tablet 3    ciclopirox (PENLAC) 8 % Soln Apply topically nightly. 6.6 mL 11    DULoxetine (CYMBALTA) 60 MG capsule TAKE ONE CAPSULE BY MOUTH TWICE DAILY 180 capsule 0    ergocalciferol, vitamin D2, (VITAMIN D ORAL) Take 1 capsule by mouth Daily.      gabapentin (NEURONTIN) 100 MG capsule Take 1 capsule (100 mg total) by mouth 3 (three) times daily. 90 capsule 11    hydroCHLOROthiazide (HYDRODIURIL) 12.5 MG Tab TAKE 1 TABLET(12.5 MG) BY MOUTH EVERY DAY 90 tablet 0    methadone HCl (METHADONE ORAL) Take 85 mg by mouth once daily.      methylPREDNISolone (MEDROL DOSEPACK) 4 mg tablet Use as directed per package directions. 1 each 0    omeprazole (PRILOSEC) 40 MG capsule TAKE 1 CAPSULE(40 MG) BY MOUTH EVERY DAY 90 capsule 0    traMADoL (ULTRAM) 50 mg tablet Take 2 tablets (100 mg total) by mouth every 6 (six) hours. 30 tablet 0    Ubidecar/Fish Oil/Omega-3/Jude (CO Q10-FISH OIL-OMEGA 3-E ORAL) Take 1 capsule by mouth Daily.      valACYclovir (VALTREX) 500 MG  tablet Take 1 tablet (500 mg total) by mouth once daily. 90 tablet 3    vitamin D (VITAMIN D3) 1000 units Tab Take 1,000 Units by mouth once daily.      ZINC ORAL Take 1 capsule by mouth Daily.       No current facility-administered medications for this visit.       ROS  Review of Systems   All other systems reviewed and are negative.        OBJECTIVE     Physical Exam  Vitals:    08/01/24 1012   BP: 120/70   Pulse: 82   Resp: 18   Temp: 98.3 °F (36.8 °C)    Body mass index is 39.48 kg/m².            Physical Exam  Vitals reviewed.   Constitutional:       General: She is not in acute distress.     Appearance: Normal appearance. She is obese.   HENT:      Head: Normocephalic and atraumatic.      Mouth/Throat:      Mouth: Mucous membranes are moist.      Pharynx: Oropharynx is clear.     Eyes:      Extraocular Movements: Extraocular movements intact.      Conjunctiva/sclera: Conjunctivae normal.      Pupils: Pupils are equal, round, and reactive to light.      Comments: Periorbital edema with overlying erythema   Cardiovascular:      Rate and Rhythm: Normal rate and regular rhythm.      Pulses: Normal pulses.      Heart sounds: Normal heart sounds.   Pulmonary:      Effort: Pulmonary effort is normal.      Breath sounds: Normal breath sounds.   Abdominal:      General: There is no distension.   Musculoskeletal:         General: Normal range of motion.      Cervical back: Normal range of motion and neck supple.   Skin:     General: Skin is warm and dry.   Neurological:      General: No focal deficit present.      Mental Status: She is alert.           Health Maintenance         Date Due Completion Date    Pneumococcal Vaccines (Age 0-64) (1 of 2 - PCV) Never done ---    Colorectal Cancer Screening Never done ---    LDCT Lung Screen Never done ---    Shingles Vaccine (1 of 2) Never done ---    RSV Vaccine (Age 60+ and Pregnant patients) (1 - 1-dose 60+ series) Never done ---    COVID-19 Vaccine (1 - 2023-24 season)  Never done ---    Mammogram 02/15/2024 2/15/2023    Influenza Vaccine (1) 09/01/2024 9/16/2016    Hemoglobin A1c (Diabetic Prevention Screening) 02/16/2026 2/16/2023    Lipid Panel 02/16/2028 2/16/2023    TETANUS VACCINE 05/20/2028 5/20/2018              ASSESSMENT     64 y.o. female with     1. Periorbital edema of right eye    2. Abscessed tooth    3. Essential hypertension    4. Screening for colorectal cancer    5. Long term current use of therapeutic drug    6. Herpes genitalis in women        PLAN:     1. Periorbital edema of right eye  2. Abscessed tooth  - Treat empirically with Augmentin. Follow up with dentist for abscessed tooth. Strict ED precautions given.   - amoxicillin-clavulanate 875-125mg (AUGMENTIN) 875-125 mg per tablet; Take 1 tablet by mouth every 12 (twelve) hours. for 7 days  Dispense: 14 tablet; Refill: 0    3. Essential hypertension  - Controlled on current regimen. Continue.   - TSH; Future  - Lipid Panel; Future  - Comprehensive Metabolic Panel; Future  - CBC Auto Differential; Future    4. Screening for colorectal cancer  - Fecal Immunochemical Test (iFOBT); Future    5. Long term current use of therapeutic drug  - Hemoglobin A1C; Future    6. Herpes genitalis in women  - Controlled on current regimen. Continue.   - valACYclovir (VALTREX) 500 MG tablet; Take 1 tablet (500 mg total) by mouth once daily.  Dispense: 90 tablet; Refill: 3        RTC in 6 months, earlier PRN       Quinton Miller MD  Family Medicine  [facility]  08/01/2024    This document was created using voice recognition software (M*Modal Fluency Direct). Although it may be edited, this document may contain errors related to incorrect recognition of the spoken word. Please call the physician if clarification is needed.       No follow-ups on file.

## 2024-08-13 ENCOUNTER — TELEPHONE (OUTPATIENT)
Dept: ORTHOPEDICS | Facility: CLINIC | Age: 64
End: 2024-08-13
Payer: COMMERCIAL

## 2024-08-13 NOTE — TELEPHONE ENCOUNTER
Called pt and LVM that I r/ s pt appt due to Dr. Mccarthy having a meeting in the afternoon he needs to attend. Stated pt can call us back if that date and time does not work.

## 2024-08-22 ENCOUNTER — TELEPHONE (OUTPATIENT)
Dept: ORTHOPEDICS | Facility: CLINIC | Age: 64
End: 2024-08-22
Payer: COMMERCIAL

## 2024-08-22 DIAGNOSIS — M25.561 CHRONIC PAIN OF BOTH KNEES: Primary | ICD-10-CM

## 2024-08-22 DIAGNOSIS — G89.29 CHRONIC PAIN OF BOTH KNEES: Primary | ICD-10-CM

## 2024-08-22 DIAGNOSIS — M25.562 CHRONIC PAIN OF BOTH KNEES: Primary | ICD-10-CM

## 2024-08-24 DIAGNOSIS — I10 ESSENTIAL HYPERTENSION: ICD-10-CM

## 2024-08-24 DIAGNOSIS — R60.0 BILATERAL LOWER EXTREMITY EDEMA: ICD-10-CM

## 2024-08-24 RX ORDER — HYDROCHLOROTHIAZIDE 12.5 MG/1
12.5 TABLET ORAL
Qty: 90 TABLET | Refills: 3 | Status: SHIPPED | OUTPATIENT
Start: 2024-08-24

## 2024-08-24 NOTE — TELEPHONE ENCOUNTER
Refill Decision Note   Chanelle Downey  is requesting a refill authorization.  Brief Assessment and Rationale for Refill:  Approve     Medication Therapy Plan:         Comments:     Note composed:6:22 PM 08/24/2024

## 2024-08-24 NOTE — TELEPHONE ENCOUNTER
No care due was identified.  [facility] Embedded Care Due Messages. Reference number: [id number].   8/24/2024 5:26:40 AM CDT

## 2024-08-26 ENCOUNTER — TELEPHONE (OUTPATIENT)
Dept: NEUROLOGY | Facility: CLINIC | Age: 64
End: 2024-08-26
Payer: COMMERCIAL

## 2024-08-26 NOTE — TELEPHONE ENCOUNTER
----- Message from Selma Avila sent at 8/26/2024  8:29 AM CDT -----  Regarding: Reschedule Appt  Contact: Patient  Type:  Sooner Apoointment Request    Caller is requesting a sooner appointment.  Caller declined first available appointment listed below.  Caller will not accept being placed on the waitlist and is requesting a message be sent to doctor.  Name of Caller: Patient   When is the first available appointment?11/05/2024   Symptoms: MRI follow up   Would the patient rather a call back or a response via [facility]? Call back   Best Call Back Number:  [phone]  Additional Information: Patient is scheduled for an appt on 08/28/2024 Patient was unable to complete MRI's due to being sick and had to postpone appts until 09/15/2024 Patient would like to reschedule appt with physician for after MRI's are completed please assist

## 2024-09-04 ENCOUNTER — TELEPHONE (OUTPATIENT)
Dept: ORTHOPEDICS | Facility: CLINIC | Age: 64
End: 2024-09-04
Payer: COMMERCIAL

## 2024-09-04 NOTE — TELEPHONE ENCOUNTER
Called and spoke to pt regarding rescheduling apt pt understood conversation with no further questions.     ----- Message from Piper Culp sent at 9/4/2024 10:08 AM CDT -----  Type: Reschedule Apt     Who Called: Pt   Does the patient know what this is regarding?: pt confused apt time & need to reschedule   Would the patient rather a call back or a response via [facility]?  Call   Best Call Back Number: [phone]   Additional Information:

## 2024-10-14 ENCOUNTER — TELEPHONE (OUTPATIENT)
Dept: ORTHOPEDICS | Facility: CLINIC | Age: 64
End: 2024-10-14
Payer: COMMERCIAL

## 2024-10-14 NOTE — TELEPHONE ENCOUNTER
Attempted to contact pt, no answer. LVM with reason for call and call back number   ----- Message from Maxta sent at 10/14/2024 10:24 AM CDT -----  Appointment Request    Name of Caller:Pt  Symptoms or reason for appointment:knee surgery consult  [facility] Call Back Number:[phone]  Additional Information: Pt would like to make an inpatient appointment to speak to a surgeon about having her knee replaced

## 2024-10-14 NOTE — TELEPHONE ENCOUNTER
Pt states that someone told her that she needed to reschedule her appointment. Pt has appointment scheduled with Dr. Mccarthy on 10/28 and thought that was the appointment that needed to be rescheduled. Confirmed her appointment with Dr. Mccarthy was still scheduled and that she missed appointment she had scheduled today.

## 2024-10-21 ENCOUNTER — TELEPHONE (OUTPATIENT)
Dept: ORTHOPEDICS | Facility: CLINIC | Age: 64
End: 2024-10-21
Payer: COMMERCIAL

## 2024-10-21 NOTE — TELEPHONE ENCOUNTER
Spoke to patient regarding mri's. Stated to patient that Deisy was not in clinic on Monday's. Also stated that I will fax the orders to to stand up mri. Patient stated thank you. Thanks.

## 2024-10-23 DIAGNOSIS — F32.A DEPRESSION, UNSPECIFIED DEPRESSION TYPE: ICD-10-CM

## 2024-10-23 DIAGNOSIS — G89.4 CHRONIC PAIN SYNDROME: ICD-10-CM

## 2024-10-23 DIAGNOSIS — M48.02 DEGENERATIVE CERVICAL SPINAL STENOSIS: ICD-10-CM

## 2024-10-23 RX ORDER — DULOXETIN HYDROCHLORIDE 60 MG/1
60 CAPSULE, DELAYED RELEASE ORAL 2 TIMES DAILY
Qty: 180 CAPSULE | Refills: 3 | Status: SHIPPED | OUTPATIENT
Start: 2024-10-23

## 2024-10-23 NOTE — TELEPHONE ENCOUNTER
Refill Decision Note   Chanelle Downey  is requesting a refill authorization.  Brief Assessment and Rationale for Refill:  Approve     Medication Therapy Plan:         Comments:     Note composed:12:10 PM 10/23/2024

## 2024-10-23 NOTE — TELEPHONE ENCOUNTER
No care due was identified.  [facility] Embedded Care Due Messages. Reference number: [id number].   10/23/2024 5:27:34 AM CDT

## 2024-10-30 ENCOUNTER — HOSPITAL ENCOUNTER (OUTPATIENT)
Dept: RADIOLOGY | Facility: HOSPITAL | Age: 64
Discharge: HOME | End: 2024-10-30
Attending: REGISTERED NURSE
Payer: COMMERCIAL

## 2024-10-30 DIAGNOSIS — Z98.890 S/P SPINAL SURGERY: ICD-10-CM

## 2024-10-30 DIAGNOSIS — M54.16 LUMBAR RADICULOPATHY, CHRONIC: ICD-10-CM

## 2024-10-30 DIAGNOSIS — G95.9 CERVICAL MYELOPATHY: ICD-10-CM

## 2024-10-31 DIAGNOSIS — K21.9 GASTROESOPHAGEAL REFLUX DISEASE WITHOUT ESOPHAGITIS: ICD-10-CM

## 2024-10-31 RX ORDER — OMEPRAZOLE 40 MG/1
CAPSULE, DELAYED RELEASE ORAL
Qty: 90 CAPSULE | Refills: 3 | Status: SHIPPED | OUTPATIENT
Start: 2024-10-31

## 2024-11-16 DIAGNOSIS — M48.02 DEGENERATIVE CERVICAL SPINAL STENOSIS: ICD-10-CM

## 2024-11-16 DIAGNOSIS — F32.A DEPRESSION, UNSPECIFIED DEPRESSION TYPE: ICD-10-CM

## 2024-11-16 DIAGNOSIS — G89.4 CHRONIC PAIN SYNDROME: ICD-10-CM

## 2024-11-16 NOTE — TELEPHONE ENCOUNTER
No care due was identified.  [facility] Embedded Care Due Messages. Reference number: [id number].   11/16/2024 9:37:31 AM CST

## 2024-11-17 RX ORDER — DULOXETIN HYDROCHLORIDE 60 MG/1
60 CAPSULE, DELAYED RELEASE ORAL 2 TIMES DAILY
Qty: 180 CAPSULE | Refills: 2 | Status: SHIPPED | OUTPATIENT
Start: 2024-11-17

## 2024-11-17 NOTE — TELEPHONE ENCOUNTER
Refill Decision Note   Chanelle Downey  is requesting a refill authorization.  Brief Assessment and Rationale for Refill:  Approve     Medication Therapy Plan:       Medication Reconciliation Completed: No   Comments:     No Care Gaps recommended.     Note composed:8:45 AM 11/17/2024

## 2024-12-09 ENCOUNTER — TELEPHONE (OUTPATIENT)
Dept: ORTHOPEDICS | Facility: CLINIC | Age: 64
End: 2024-12-09
Payer: COMMERCIAL

## 2024-12-09 NOTE — TELEPHONE ENCOUNTER
Per Dr. Mccarthy this patient is no longer to be scheduled on his schedule due to her no show/ cancellation history.     [facility] Staff  Caller: Patient (Today, 12:14 PM)  Type:  Sooner Apoointment Request    Caller is requesting a sooner appointment.  Caller declined first available appointment listed below.  Caller will not accept being placed on the waitlist and is requesting a message be sent to doctor.  Name of Caller: Patient  When is the first available appointment?01/27/2025  Symptoms: bilateral knee pain follow up  Would the patient rather a call back or a response via [facility]? Call back  Best Call Back Number: [phone]  Additional Information: Patient is scheduled for an xray and to see physician on 12/09/2024 Patient stated she has to work and would like to be rescheduled for as soon as possible please assist

## 2025-01-10 DIAGNOSIS — M54.12 CERVICAL RADICULOPATHY: Primary | ICD-10-CM

## 2025-01-10 DIAGNOSIS — G89.4 CHRONIC PAIN SYNDROME: ICD-10-CM

## 2025-01-10 DIAGNOSIS — M48.07 LUMBOSACRAL STENOSIS: ICD-10-CM

## 2025-01-10 DIAGNOSIS — M54.16 LUMBAR RADICULOPATHY: ICD-10-CM

## 2025-01-20 ENCOUNTER — PATIENT MESSAGE (OUTPATIENT)
Dept: NEUROSURGERY | Facility: CLINIC | Age: 65
End: 2025-01-20
Payer: COMMERCIAL

## 2025-02-03 ENCOUNTER — TELEPHONE (OUTPATIENT)
Dept: ORTHOPEDICS | Facility: CLINIC | Age: 65
End: 2025-02-03
Payer: COMMERCIAL

## 2025-02-03 NOTE — TELEPHONE ENCOUNTER
Called pt to ask her to choose which appt date she wants from the two she scheduled, no answer and unable to leave voicemail. Called pt's son and LVM asking him to have her call us and left our  callback number.

## 2025-02-05 ENCOUNTER — HOSPITAL ENCOUNTER (OUTPATIENT)
Dept: RADIOLOGY | Facility: HOSPITAL | Age: 65
Discharge: HOME | End: 2025-02-05
Attending: ORTHOPAEDIC SURGERY
Payer: COMMERCIAL

## 2025-02-05 ENCOUNTER — TELEPHONE (OUTPATIENT)
Dept: ORTHOPEDICS | Facility: CLINIC | Age: 65
End: 2025-02-05

## 2025-02-05 ENCOUNTER — OFFICE VISIT (OUTPATIENT)
Dept: ORTHOPEDICS | Facility: CLINIC | Age: 65
End: 2025-02-05
Payer: COMMERCIAL

## 2025-02-05 VITALS — WEIGHT: 212.63 LBS | BODY MASS INDEX: 39.13 KG/M2 | HEIGHT: 62 IN

## 2025-02-05 DIAGNOSIS — M25.562 CHRONIC PAIN OF BOTH KNEES: Primary | ICD-10-CM

## 2025-02-05 DIAGNOSIS — M25.561 CHRONIC PAIN OF BOTH KNEES: Primary | ICD-10-CM

## 2025-02-05 DIAGNOSIS — M25.562 CHRONIC PAIN OF BOTH KNEES: ICD-10-CM

## 2025-02-05 DIAGNOSIS — M25.561 CHRONIC PAIN OF BOTH KNEES: ICD-10-CM

## 2025-02-05 DIAGNOSIS — G89.29 CHRONIC PAIN OF BOTH KNEES: Primary | ICD-10-CM

## 2025-02-05 DIAGNOSIS — G89.29 CHRONIC PAIN OF BOTH KNEES: ICD-10-CM

## 2025-02-05 PROCEDURE — 1159F MED LIST DOCD IN RCRD: CPT | Mod: CPTII,S$GLB,, | Performed by: ORTHOPAEDIC SURGERY

## 2025-02-05 PROCEDURE — 73565 X-RAY EXAM OF KNEES: CPT | Mod: TC

## 2025-02-05 PROCEDURE — 99213 OFFICE O/P EST LOW 20 MIN: CPT | Mod: S$GLB,,, | Performed by: ORTHOPAEDIC SURGERY

## 2025-02-05 PROCEDURE — 3008F BODY MASS INDEX DOCD: CPT | Mod: CPTII,S$GLB,, | Performed by: ORTHOPAEDIC SURGERY

## 2025-02-05 PROCEDURE — 73565 X-RAY EXAM OF KNEES: CPT | Mod: 26,,, | Performed by: RADIOLOGY

## 2025-02-05 PROCEDURE — 99999 PR PBB SHADOW E&M-EST. PATIENT-LVL III: CPT | Mod: PBBFAC,,, | Performed by: ORTHOPAEDIC SURGERY

## 2025-02-05 NOTE — TELEPHONE ENCOUNTER
Tried to call pt and was unable to leave VM to update pt on her lab results. Pt verbalized understanding and has no further questions.    ----- Message from Yonatan Mccarthy MD sent at 2/5/2025  1:48 PM CST -----  Please call pt.  Labs are OK.  Have her get a CSI tomorrow

## 2025-02-05 NOTE — PROGRESS NOTES
"  Subjective:      Patient ID: Chanelle Downey is a 64 y.o. female.    Chief Complaint: right knee pain    HPI  Chanelle Downey has right knee pain. This has been going on since 6/2024. No inciting injury or trauma. She was seen by MICHEAL Yee, CSI was done with little relief. MRI was ordered for further evaluation. Conservative management was attempted with protected weight bearing, ibuprofen, tylenol. Also takes methadone the past 20 years.  The pain has significantly worsened. The pain is located in the global aspect of the knee.  There  is radiation to entire RLE. There is associated stiffness. There is catching and locking. The pain is described as sharp and achy. The pain is aggravated by weight bearing and walking.  It is alleviated by rest. She complains of right sided restless leg syndrome. There is chronic back pain. She has numbness and tingling right toes. She ambulates with a cane but arrives today in a wheelchair. Her history, medications and problem list were reviewed. She works as an LPN at [employer].     Review of Systems   Constitutional: Negative for fever and night sweats.   HENT:  Negative for hearing loss.    Eyes:  Negative for blurred vision and visual disturbance.   Cardiovascular:  Negative for chest pain and leg swelling.   Respiratory:  Negative for shortness of breath.    Endocrine: Negative for polyuria.   Hematologic/Lymphatic: Negative for bleeding problem.   Skin:  Negative for rash.   Musculoskeletal:  Positive for joint pain. Negative for back pain, joint swelling, muscle cramps and muscle weakness.   Gastrointestinal:  Negative for melena.   Genitourinary:  Negative for hematuria.   Neurological:  Negative for loss of balance, numbness and paresthesias.   Psychiatric/Behavioral:  Negative for altered mental status.          Objective:      Body mass index is 38.89 kg/m².  Vitals:    02/05/25 1154   Weight: 96.5 kg (212 lb 10.1 oz)   Height: 5' 2" (1.575 m) "           General    Constitutional: She is oriented to person, place, and time. She appears well-developed and well-nourished.   HENT:   Head: Normocephalic and atraumatic.   Eyes: EOM are normal.   Cardiovascular:  Normal rate.            Pulmonary/Chest: Effort normal.   Neurological: She is alert and oriented to person, place, and time.   Psychiatric: She has a normal mood and affect. Her behavior is normal.     General Musculoskeletal Exam   Gait: abnormal and antalgic       Right Knee Exam     Inspection   Erythema: absent  Scars: absent  Swelling: present  Effusion: present  Deformity: absent  Bruising: absent    Tenderness   The patient is tender to palpation of the medial joint line and lateral joint line.    Range of Motion   Extension:  30   Flexion:  80     Tests   Ligament Examination   Lachman: normal (-1 to 2mm)   MCL - Valgus: normal (0 to 2mm)  LCL - Varus: normal  Patella   Passive Patellar Tilt: neutral    Other   Sensation: normal    Comments:  Pain limited examination     Left Knee Exam     Inspection   Erythema: absent  Scars: absent  Swelling: present  Effusion: present  Deformity: absent  Bruising: absent    Tenderness   The patient tender to palpation of the medial joint line.    Range of Motion   Extension:  5   Flexion:  90     Tests   Stability   Lachman: normal (-1 to 2mm)   MCL - Valgus: normal (0 to 2mm)  LCL - Varus: normal (0 to 2mm)  Patella   Passive Patellar Tilt: neutral    Other   Sensation: normal    Muscle Strength   Right Lower Extremity   Hip Abduction: 4/5   Quadriceps:  4/5   Hamstrin/5   Left Lower Extremity   Hip Abduction: 5/5   Quadriceps:  5/5   Hamstrin/5     Reflexes     Left Side  Quadriceps:  2+    Right Side   Quadriceps:  2+    Vascular Exam     Right Pulses  Dorsalis Pedis:      2+          Left Pulses  Dorsalis Pedis:      2+          Edema  Right Lower Leg: present  Left Lower Leg: present      XR BL knee  Radiographs taken today and reviewed by me  demonstrate severe arthritic change of the bilateral KNEE(s). Significant progression of right knee arthritis and varus compared to prior XR from 6/2024. There  is not bone destruction.  There is a healing segmental fracture of the proximal fibula.. The medial compartment is most involved.  There is a varus deformity.  The changes are tricompartmental.    MRI right knee 7/25/24  There is an ACL tear. Quadriceps and patellar tendons are unremarkable. PCL is intact. There is a grade 2 strain of the inner fibers of the MCL. LCL is a intact. There are contusions of the distal femur and proximal tibia with marrow edema. There is a tear of the body and posterior horn of the medial meniscus. There is a tear of the body of the lateral meniscus. There is moderate medial and mild lateral knee joint compartment DJD. There is mild DJD of the patella. Retinacula are intact. There is a small joint effusio       Assessment:       Encounter Diagnosis   Name Primary?    Chronic pain of both knees Yes          Plan:       Chanelle was seen today for pain and pain.    Diagnoses and all orders for this visit:    Chronic pain of both knees  -     C-Reactive Protein; Future  -     Sedimentation rate; Future        Were discussed.  There has been rapid progression of arthritis in an unexplained proximal fibula fracture.  Her pain is severe.  We will check an ESR and CRP today.  If these are fine then I think we can go ahead and proceed with a corticosteroid injection for symptomatic relief while we are better evaluating this.

## 2025-02-06 ENCOUNTER — OFFICE VISIT (OUTPATIENT)
Dept: ORTHOPEDICS | Facility: CLINIC | Age: 65
End: 2025-02-06
Payer: COMMERCIAL

## 2025-02-06 DIAGNOSIS — M17.11 PRIMARY OSTEOARTHRITIS OF RIGHT KNEE: Primary | ICD-10-CM

## 2025-02-06 DIAGNOSIS — M54.16 LUMBAR RADICULOPATHY, CHRONIC: ICD-10-CM

## 2025-02-06 PROCEDURE — 99999 PR PBB SHADOW E&M-EST. PATIENT-LVL III: CPT | Mod: PBBFAC,,,

## 2025-02-06 PROCEDURE — 1159F MED LIST DOCD IN RCRD: CPT | Mod: CPTII,S$GLB,,

## 2025-02-06 PROCEDURE — 1160F RVW MEDS BY RX/DR IN RCRD: CPT | Mod: CPTII,S$GLB,,

## 2025-02-06 PROCEDURE — 99213 OFFICE O/P EST LOW 20 MIN: CPT | Mod: S$GLB,,,

## 2025-02-06 RX ORDER — METHOCARBAMOL 500 MG/1
500 TABLET, FILM COATED ORAL 4 TIMES DAILY
Qty: 40 TABLET | Refills: 0 | Status: SHIPPED | OUTPATIENT
Start: 2025-02-06

## 2025-02-06 NOTE — PROGRESS NOTES
SUBJECTIVE:     Chief Complaint & History of Present Illness:  Chanelle Downey is a 64 y.o. female who is seen here today for follow up of right knee pain.  Patient was seen in clinic yesterday by Dr. Mccarthy.  She was sent for labs including ESR and CRP due to a rapid progression of her right knee primary osteoarthritis.  The labs came back within normal limits, so she was scheduled to follow up with me for a right knee intra-articular CSI.    Patient notes that she forgot to mentioned it to Dr. Mccarthy yesterday, but she previously received right knee intra-articular CSI at [facility] about 2 months ago.  She endorses only one-week relief.  Patient denied right knee intra-articular CSI and was adamant about receiving a right total knee arthroplasty if possible.    Today, she endorses a sharp pain located in the medial knee. The pain is worse with any weight bearing and mildly improved by acetaminophen and ibuprofen. The patient notes effusion, knee giving out, crepitus sensation, and occasional right leg muscle spasms but no associated injury, knee locking.      Past Medical History:   Diagnosis Date    Back pain, chronic     Biliary colic 2018    Calculus of gallbladder without cholecystitis without obstruction 2018    Cervical radiculopathy 3/21/2016    01/17/18--clinic visit, pt describes intermittent BUE numbness/tingling throughout entire arm/hand.  Negative Spurling's test bilaterally.    Cervical stenosis of spinal canal 3/21/2016    Degenerative cervical spinal stenosis 2/10/2016    Depression     Flat back syndrome, postprocedural 2018    Dx 10/2016 when seen in NSGY clinic.  Presents to Neurology clinic 18 for worsening gait, LBP, LLE weakness, and RLE neuropathic pain.    HSV (herpes simplex virus) anogenital infection 2016       Past Surgical History:   Procedure Laterality Date    APPENDECTOMY       SECTION      EYE SURGERY      For  strabismus    HYSTERECTOMY      LAPAROSCOPIC CHOLECYSTECTOMY N/A 6/11/2018    Procedure: CHOLECYSTECTOMY, LAPAROSCOPIC ;  Surgeon: Yonatan Berry MD;  Location: [facility] OR [address] [place] [address];  Service: General;  Laterality: N/A;    LUMBAR DISCECTOMY  2002    Unknown vertebra    LUMBAR FUSION  2010    L4-S1 fusion reported    TONSILLECTOMY         Family History   Problem Relation Name Age of Onset    Cancer Mother          non hodgkins lymphoma    Alcohol abuse Mother      Arthritis Mother      Depression Mother      Hypertension Mother      Heart disease Mother      Mental illness Daughter 1     Birth defects Daughter 1        Review of patient's allergies indicates:  No Known Allergies        Current Outpatient Medications:     aspirin 81 mg Cap, Take 81 mg by mouth Daily., Disp: , Rfl:     atorvastatin (LIPITOR) 40 MG tablet, Take 1 tablet (40 mg total) by mouth once daily., Disp: 90 tablet, Rfl: 3    bumetanide (BUMEX) 0.5 MG Tab, Take 1 tablet (0.5 mg total) by mouth once daily., Disp: 90 tablet, Rfl: 3    ciclopirox (PENLAC) 8 % Soln, Apply topically nightly., Disp: 6.6 mL, Rfl: 11    DULoxetine (CYMBALTA) 60 MG capsule, TAKE ONE CAPSULE BY MOUTH TWICE DAILY, Disp: 180 capsule, Rfl: 2    ergocalciferol, vitamin D2, (VITAMIN D ORAL), Take 1 capsule by mouth Daily., Disp: , Rfl:     gabapentin (NEURONTIN) 100 MG capsule, Take 1 capsule (100 mg total) by mouth 3 (three) times daily., Disp: 90 capsule, Rfl: 11    hydroCHLOROthiazide (HYDRODIURIL) 12.5 MG Tab, TAKE 1 TABLET(12.5 MG) BY MOUTH EVERY DAY, Disp: 90 tablet, Rfl: 3    methadone HCl (METHADONE ORAL), Take 80 mg by mouth once daily., Disp: , Rfl:     methocarbamoL (ROBAXIN) 500 MG Tab, Take 1 tablet (500 mg total) by mouth 4 (four) times daily., Disp: 40 tablet, Rfl: 0    methylPREDNISolone (MEDROL DOSEPACK) 4 mg tablet, Use as directed per package directions., Disp: 1 each, Rfl: 0    omeprazole (PRILOSEC) 40 MG capsule, TAKE 1 CAPSULE(40 MG) BY MOUTH EVERY DAY,  Disp: 90 capsule, Rfl: 3    traMADoL (ULTRAM) 50 mg tablet, Take 2 tablets (100 mg total) by mouth every 6 (six) hours., Disp: 30 tablet, Rfl: 0    Ubidecar/Fish Oil/Omega-3/Jude (CO Q10-FISH OIL-OMEGA 3-E ORAL), Take 1 capsule by mouth Daily., Disp: , Rfl:     valACYclovir (VALTREX) 500 MG tablet, Take 1 tablet (500 mg total) by mouth once daily., Disp: 90 tablet, Rfl: 3    vitamin D (VITAMIN D3) 1000 units Tab, Take 1,000 Units by mouth once daily., Disp: , Rfl:     ZINC ORAL, Take 1 capsule by mouth Daily., Disp: , Rfl:       Review of Systems:  ROS:  The updated medical history is in the chart and has been reviewed. A review of systems is updated and there is no reported vision changes, ear/nose/mouth/throat complaints, chest pain, shortness of breath, abdominal pain, urological complaints, fevers or chills, psychiatric complaints. Musculoskeletal and neurologcial symptoms are as documented. All other systems are negative.      OBJECTIVE:     PHYSICAL EXAM:  There were no vitals taken for this visit.  General: Pleasant, cooperative, NAD.  HEENT: NCAT, sclera nonicteric.  Lungs: Respirations are equal and unlabored.   Abdomen: Soft and non-tender.  CV: 2+ bilateral upper and lower extremity pulses.  Neuro: Sensation intact to light touch.  Skin: Intact throughout LE with no rashes, erythema, or lesions.  Extremities: No LE edema, NVI lower extremities. antalgic gait.    right Knee Exam:  Knee Range of Motion:    Effusion:  Mild  Condition of skin: intact  Location of tenderness: Medial joint line and Lateral joint line   Strength: 5/5 quadriceps strength, 5/5 gastroc-soleus strength, 5/5 hamstring strength, and 5/5 tibialis anterior strength  Stability: stable to testing  Knee Alignment: normal    right Hip Exam:  TTP: None  90 degrees flexion  10 degrees extension   10 degrees internal rotation  30 degrees external rotation  30 degrees abduction  20 degrees adduction   0 flexion contracture   negative  HANNAH   negative FADIR  negative [facility]    RADIOGRAPHS:  X-rays of the right knee taken on 02/05/2025 personally reviewed. Imaging reveals moderate to severe tricompartmental joint space narrowing worse in the medial tibiofemoral joint space with subchondral cyst and osteophyte formation.  To areas of healing-healed right fibular shaft fractures noted as well.      ASSESSMENT:       ICD-10-CM ICD-9-CM   1. Primary osteoarthritis of right knee  M17.11 715.16   2. Lumbar radiculopathy, chronic  M54.16 724.4       PLAN:     We discussed with the patient at length all the different treatment options available including anti-inflammatories, acetaminophen, rest, ice, knee strengthening exercise, occasional cortisone injections for temporary relief, Viscosupplimentation injections, arthroscopic debridement, osteotomy, and finally knee arthroplasty.     Patient denied right knee intra-articular CSI and would like to speak with Dr. Mccarthy about possible right total knee arthroplasty.    Methocarbamol sent to pharmacy as needed for muscle spasms.    Message sent to Dr. Mccarthy's team for possible right total knee arthroplasty.      DISCLAIMER: This note was prepared with Jaunt voice recognition transcription software. Garbled syntax, mangled pronouns, and other bizarre constructions may be attributed to that software system.    Britton Frederick Jr., VINCENT

## 2025-02-10 ENCOUNTER — TELEPHONE (OUTPATIENT)
Dept: ORTHOPEDICS | Facility: CLINIC | Age: 65
End: 2025-02-10
Payer: COMMERCIAL

## 2025-02-10 DIAGNOSIS — M84.463D: Primary | ICD-10-CM

## 2025-02-10 NOTE — TELEPHONE ENCOUNTER
Called patient to notify her that Dr. Mccarthy like her to have an osteoporosis workup.  DEXA scan and vitamin-D ordered.  I will call the patient with the results following the scan.

## 2025-02-10 NOTE — TELEPHONE ENCOUNTER
LV for patient to get her scheduled for DEXA scan. Left name and call back number.    ----- Message from Britton Frederick PA-C sent at 2/10/2025  9:45 AM CST -----  I just reach out to the patient to explained that we are going to get a DEXA scan and vitamin-D level check.  Would you mind scheduling the DEXA scan, along with letting her know that she may obtain the vitamin-D labs at her earliest convenience?

## 2025-02-11 ENCOUNTER — TELEPHONE (OUTPATIENT)
Dept: ORTHOPEDICS | Facility: CLINIC | Age: 65
End: 2025-02-11
Payer: COMMERCIAL

## 2025-02-11 NOTE — TELEPHONE ENCOUNTER
Called and scheduled patient's DEXA scan at [facility], [place] and asked patient to get labs done at any [facility] facility.     ----- Message from Med Assistant Jean sent at 2/10/2025  2:25 PM CST -----  Regarding: call and book DEXA    ----- Message -----  From: Britton Frederick PA-C  Sent: 2/10/2025   9:45 AM CST  To: Miranda Mercedes MA    I just reach out to the patient to explained that we are going to get a DEXA scan and vitamin-D level check.  Would you mind scheduling the DEXA scan, along with letting her know that she may obtain the vitamin-D labs at her earliest convenience?

## 2025-02-17 ENCOUNTER — OFFICE VISIT (OUTPATIENT)
Dept: INTERNAL MEDICINE | Facility: CLINIC | Age: 65
End: 2025-02-17
Payer: COMMERCIAL

## 2025-02-17 VITALS
BODY MASS INDEX: 39.11 KG/M2 | OXYGEN SATURATION: 98 % | HEIGHT: 62 IN | HEART RATE: 79 BPM | SYSTOLIC BLOOD PRESSURE: 115 MMHG | WEIGHT: 212.5 LBS | DIASTOLIC BLOOD PRESSURE: 62 MMHG

## 2025-02-17 DIAGNOSIS — G89.4 CHRONIC PAIN SYNDROME: ICD-10-CM

## 2025-02-17 DIAGNOSIS — Z12.31 ENCOUNTER FOR SCREENING MAMMOGRAM FOR BREAST CANCER: ICD-10-CM

## 2025-02-17 DIAGNOSIS — I10 ESSENTIAL HYPERTENSION: ICD-10-CM

## 2025-02-17 DIAGNOSIS — F11.20 METHADONE DEPENDENCE: ICD-10-CM

## 2025-02-17 DIAGNOSIS — A60.09 HERPES GENITALIS IN WOMEN: ICD-10-CM

## 2025-02-17 DIAGNOSIS — R60.0 EDEMA OF BOTH LOWER EXTREMITIES: ICD-10-CM

## 2025-02-17 DIAGNOSIS — G95.9 CERVICAL MYELOPATHY: ICD-10-CM

## 2025-02-17 DIAGNOSIS — E66.01 CLASS 2 SEVERE OBESITY DUE TO EXCESS CALORIES WITH SERIOUS COMORBIDITY AND BODY MASS INDEX (BMI) OF 38.0 TO 38.9 IN ADULT: ICD-10-CM

## 2025-02-17 DIAGNOSIS — F17.210 CIGARETTE NICOTINE DEPENDENCE WITHOUT COMPLICATION: ICD-10-CM

## 2025-02-17 DIAGNOSIS — E66.812 CLASS 2 SEVERE OBESITY DUE TO EXCESS CALORIES WITH SERIOUS COMORBIDITY AND BODY MASS INDEX (BMI) OF 38.0 TO 38.9 IN ADULT: ICD-10-CM

## 2025-02-17 DIAGNOSIS — E78.2 MIXED HYPERLIPIDEMIA: Primary | ICD-10-CM

## 2025-02-17 NOTE — PROGRESS NOTES
INTERNAL MEDICINE PROGRESS/URGENT CARE NOTE    CHIEF COMPLAINT     Chief Complaint   Patient presents with    Follow-up       HPI     Chanelle Downey is a 64 y.o. female who presents for a follow up visit today.    PCP: Dr. Miller    HTN: BP controlled on 12.5 mg hctz. Compliant, tolerates well. Denies chest pain, palpitations, SOB, blurry vision, headaches, presyncopal symptoms.     HLD- on lipitor but hasn't been taking it bc she read it can cause memory issues.   She continues to smoke about 1 PPD. Has been for the last 23 yrs.     Chronic neck pain- sees pain mgmnt. Also on methadone which she goes to methadone clinic.     Venous insufficiency- on bumex prn. Has been eating a lot of salt lately. Legs are swollen.     Chronic knee pain- found to have incidental fx with ortho to R knee. May need TKR.     Her biggest concern is obesity. She would like meds to help with weight loss.  She is interested in the GLP 1 injections for this.  She denies any history of medullary thyroid cancer or multiple endocrine neoplasia.  No history pancreatitis.      Problem List[1]     Past Medical History:  Past Medical History:   Diagnosis Date    Back pain, chronic     Biliary colic 2018    Calculus of gallbladder without cholecystitis without obstruction 2018    Cervical radiculopathy 3/21/2016    01/17/18--clinic visit, pt describes intermittent BUE numbness/tingling throughout entire arm/hand.  Negative Spurling's test bilaterally.    Cervical stenosis of spinal canal 3/21/2016    Degenerative cervical spinal stenosis 2/10/2016    Depression     Flat back syndrome, postprocedural 2018    Dx 10/2016 when seen in NSGY clinic.  Presents to Neurology clinic 18 for worsening gait, LBP, LLE weakness, and RLE neuropathic pain.    HSV (herpes simplex virus) anogenital infection 2016        Past Surgical History:  Past Surgical History:   Procedure Laterality Date    APPENDECTOMY       SECTION       "EYE SURGERY  1967    For strabismus    HYSTERECTOMY      LAPAROSCOPIC CHOLECYSTECTOMY N/A 6/11/2018    Procedure: CHOLECYSTECTOMY, LAPAROSCOPIC ;  Surgeon: Yonatan Berry MD;  Location: [facility] OR [address] [place] [address];  Service: General;  Laterality: N/A;    LUMBAR DISCECTOMY  2002    Unknown vertebra    LUMBAR FUSION  2010    L4-S1 fusion reported    TONSILLECTOMY          Allergies:  Review of patient's allergies indicates:  No Known Allergies    Home Medications:  Current Medications[2]     Review of Systems:  Review of Systems   Constitutional:  Negative for fever.   Respiratory:  Negative for cough and shortness of breath.    Cardiovascular:  Positive for leg swelling. Negative for chest pain.   Gastrointestinal:  Negative for abdominal pain, nausea and vomiting.   Musculoskeletal:  Positive for arthralgias.   Neurological:  Negative for weakness and headaches.         PHYSICAL EXAM     Vitals:    02/17/25 0943   BP: 115/62   BP Location: Right arm   Patient Position: Sitting   Pulse: 79   SpO2: 98%   Weight: 96.4 kg (212 lb 8.4 oz)   Height: 5' 2" (1.575 m)      Body mass index is 38.87 kg/m².     Physical Exam  Vitals reviewed.   Constitutional:       Appearance: Normal appearance.   HENT:      Head: Normocephalic.      Mouth/Throat:      Pharynx: Oropharynx is clear.   Eyes:      Conjunctiva/sclera: Conjunctivae normal.      Pupils: Pupils are equal, round, and reactive to light.   Cardiovascular:      Rate and Rhythm: Normal rate and regular rhythm.   Pulmonary:      Effort: Pulmonary effort is normal.      Breath sounds: Normal breath sounds.   Musculoskeletal:      Right lower leg: Edema present.      Left lower leg: Edema present.   Skin:     General: Skin is warm and dry.   Neurological:      Mental Status: She is alert and oriented to person, place, and time.   Psychiatric:         Mood and Affect: Mood normal.         Behavior: Behavior normal.         LABS     Lab Results   Component Value Date    HGBA1C 5.2 " 08/01/2024     CMP  Sodium   Date Value Ref Range Status   08/01/2024 139 136 - 145 mmol/L Final     Potassium   Date Value Ref Range Status   08/01/2024 4.6 3.5 - 5.1 mmol/L Final     Chloride   Date Value Ref Range Status   08/01/2024 103 95 - 110 mmol/L Final     CO2   Date Value Ref Range Status   08/01/2024 26 23 - 29 mmol/L Final     Glucose   Date Value Ref Range Status   08/01/2024 109 70 - 110 mg/dL Final     BUN   Date Value Ref Range Status   08/01/2024 10 8 - 23 mg/dL Final     Creatinine   Date Value Ref Range Status   08/01/2024 0.7 0.5 - 1.4 mg/dL Final     Calcium   Date Value Ref Range Status   08/01/2024 9.2 8.7 - 10.5 mg/dL Final     Total Protein   Date Value Ref Range Status   08/01/2024 7.1 6.0 - 8.4 g/dL Final     Albumin   Date Value Ref Range Status   08/01/2024 3.2 (L) 3.5 - 5.2 g/dL Final     Total Bilirubin   Date Value Ref Range Status   08/01/2024 0.3 0.1 - 1.0 mg/dL Final     Comment:     For infants and newborns, interpretation of results should be based  on gestational age, weight and in agreement with clinical  observations.    Premature Infant recommended reference ranges:  Up to 24 hours.............<8.0 mg/dL  Up to 48 hours............<12.0 mg/dL  3-5 days..................<15.0 mg/dL  6-29 days.................<15.0 mg/dL       Alkaline Phosphatase   Date Value Ref Range Status   08/01/2024 134 55 - 135 U/L Final     AST   Date Value Ref Range Status   08/01/2024 67 (H) 10 - 40 U/L Final     ALT   Date Value Ref Range Status   08/01/2024 48 (H) 10 - 44 U/L Final     Anion Gap   Date Value Ref Range Status   08/01/2024 10 8 - 16 mmol/L Final     eGFR if    Date Value Ref Range Status   12/21/2021 >60.0 >60 mL/min/1.73 m^2 Final     eGFR if non    Date Value Ref Range Status   12/21/2021 >60.0 >60 mL/min/1.73 m^2 Final     Comment:     Calculation used to obtain the estimated glomerular filtration  rate (eGFR) is the CKD-EPI equation.        Lab  Results   Component Value Date    WBC 6.70 08/01/2024    HGB 11.3 (L) 08/01/2024    HCT 36.1 (L) 08/01/2024    MCV 97 08/01/2024     08/01/2024     Lab Results   Component Value Date    CHOL 143 08/01/2024    CHOL 210 (H) 02/16/2023    CHOL 190 12/21/2021     Lab Results   Component Value Date    HDL 63 08/01/2024    HDL 65 02/16/2023    HDL 61 12/21/2021     Lab Results   Component Value Date    LDLCALC 70.4 08/01/2024    LDLCALC 129.4 02/16/2023    LDLCALC 112.8 12/21/2021     Lab Results   Component Value Date    TRIG 48 08/01/2024    TRIG 78 02/16/2023    TRIG 81 12/21/2021     Lab Results   Component Value Date    CHOLHDL 44.1 08/01/2024    CHOLHDL 31.0 02/16/2023    CHOLHDL 32.1 12/21/2021     Lab Results   Component Value Date    TSH 3.222 08/01/2024       ASSESSMENT     1. Mixed hyperlipidemia    2. Cigarette nicotine dependence without complication    3. Essential hypertension    4. Methadone dependence    5. Chronic pain syndrome    6. Herpes genitalis in women    7. Edema of both lower extremities    8. Encounter for screening mammogram for breast cancer    9. Cervical myelopathy    10. Class 2 severe obesity due to excess calories with serious comorbidity and body mass index (BMI) of 38.0 to 38.9 in adult           PLAN  1. Mixed hyperlipidemia  Recommend she get back on statin to help reduce her risk of stroke or heart attack  -     Comprehensive Metabolic Panel; Future; Expected date: 02/17/2025  -     Lipid Panel; Future; Expected date: 02/17/2025    2. Cigarette nicotine dependence without complication  Encouraged smoking cessation. Amenable to CT  -     CT Chest Lung Screening Low Dose; Future; Expected date: 02/17/2025    3. Essential hypertension  BP stable. Continue current tx. Low salt diet  -     CBC Auto Differential; Future; Expected date: 02/17/2025  -     Comprehensive Metabolic Panel; Future; Expected date: 02/17/2025  -     Lipid Panel; Future; Expected date: 02/17/2025  -      Hemoglobin A1C; Future; Expected date: 02/17/2025  -     TSH; Future; Expected date: 02/17/2025  -     Vitamin D; Future; Expected date: 02/17/2025    4. Methadone dependence  stable    5. Chronic pain syndrome  Stable. Continue current meds  -     CBC Auto Differential; Future; Expected date: 02/17/2025  -     Comprehensive Metabolic Panel; Future; Expected date: 02/17/2025  -     Lipid Panel; Future; Expected date: 02/17/2025  -     Hemoglobin A1C; Future; Expected date: 02/17/2025  -     TSH; Future; Expected date: 02/17/2025  -     Vitamin D; Future; Expected date: 02/17/2025    6. Herpes genitalis in women  On daily valtrex    7. Edema of both lower extremities  Needs to watch her diet. Leg elevation.     8. Encounter for screening mammogram for breast cancer  -     Mammo Digital Screening Bilat w/ Ruben (XPD); Future; Expected date: 02/17/2025    9. Cervical myelopathy  Stable. Sees pain mgmnt. On cymbalta and gabapentin    10. Class 2 severe obesity due to excess calories with serious comorbidity and body mass index (BMI) of 38.0 to 38.9 in adult  No glp1 contraindication. Insurance may not cover. Will see. If not, will need to f/u with Dr. Miller to discuss other potential options.   -     semaglutide, weight loss, 0.25 mg/0.5 mL PnIj; Inject 0.25 mg into the skin every 7 days.  Dispense: 2 mL; Refill: 0      Follow up with PCP in 6 months    Patient's plan/treatment was discussed including medications and possible side effects. Verbalized understanding of all instructions.     This note was partly generated with [employer] voice recognition software. I apologize for any possible typographical errors.          RYLAN Sheriff  [facility] of [facility] Medicine - [facility]  02/17/2025          [1]   Patient Active Problem List  Diagnosis    Degenerative cervical spinal stenosis    Cigarette nicotine dependence without complication    Methadone dependence    Cervical stenosis of spinal canal     Cervical radiculopathy    Chronic pain of left knee    Flat back syndrome, postprocedural    Sagittal plane imbalance    Chronic pain syndrome    Herpes gingivostomatitis    Herpes genitalis in women    Gastroesophageal reflux disease    Depression    Paresthesia of both lower extremities    Tobacco abuse    Gait instability    Essential hypertension    Edema of both lower extremities    Pain in both knees    Leg pain, bilateral    Lymphedema of both lower extremities    Varicose veins of both lower extremities    Mixed hyperlipidemia    Palpitations    Morbid (severe) obesity due to excess calories    Cervical myelopathy   [2]   Current Outpatient Medications:     ciclopirox (PENLAC) 8 % Soln, Apply topically nightly., Disp: 6.6 mL, Rfl: 11    DULoxetine (CYMBALTA) 60 MG capsule, TAKE ONE CAPSULE BY MOUTH TWICE DAILY, Disp: 180 capsule, Rfl: 2    gabapentin (NEURONTIN) 100 MG capsule, Take 1 capsule (100 mg total) by mouth 3 (three) times daily., Disp: 90 capsule, Rfl: 11    hydroCHLOROthiazide (HYDRODIURIL) 12.5 MG Tab, TAKE 1 TABLET(12.5 MG) BY MOUTH EVERY DAY, Disp: 90 tablet, Rfl: 3    methadone HCl (METHADONE ORAL), Take 80 mg by mouth once daily., Disp: , Rfl:     methocarbamoL (ROBAXIN) 500 MG Tab, Take 1 tablet (500 mg total) by mouth 4 (four) times daily., Disp: 40 tablet, Rfl: 0    omeprazole (PRILOSEC) 40 MG capsule, TAKE 1 CAPSULE(40 MG) BY MOUTH EVERY DAY, Disp: 90 capsule, Rfl: 3    valACYclovir (VALTREX) 500 MG tablet, Take 1 tablet (500 mg total) by mouth once daily., Disp: 90 tablet, Rfl: 3    vitamin D (VITAMIN D3) 1000 units Tab, Take 1,000 Units by mouth once daily., Disp: , Rfl:     ZINC ORAL, Take 1 capsule by mouth Daily., Disp: , Rfl:     aspirin 81 mg Cap, Take 81 mg by mouth Daily., Disp: , Rfl:     atorvastatin (LIPITOR) 40 MG tablet, Take 1 tablet (40 mg total) by mouth once daily., Disp: 90 tablet, Rfl: 3    bumetanide (BUMEX) 0.5 MG Tab, Take 1 tablet (0.5 mg total) by mouth once  daily., Disp: 90 tablet, Rfl: 3    ergocalciferol, vitamin D2, (VITAMIN D ORAL), Take 1 capsule by mouth Daily., Disp: , Rfl:     semaglutide, weight loss, 0.25 mg/0.5 mL PnIj, Inject 0.25 mg into the skin every 7 days., Disp: 2 mL, Rfl: 0    Ubidecar/Fish Oil/Omega-3/Jude (CO Q10-FISH OIL-OMEGA 3-E ORAL), Take 1 capsule by mouth Daily., Disp: , Rfl:

## 2025-02-18 NOTE — PROGRESS NOTES
Prior authorization has been initiated through [employer]. Authorization is currently pending approval by insurance company.May take 24-72 hours for your insurance to respond

## 2025-02-28 ENCOUNTER — TELEPHONE (OUTPATIENT)
Dept: ORTHOPEDICS | Facility: CLINIC | Age: 65
End: 2025-02-28
Payer: COMMERCIAL

## 2025-02-28 NOTE — TELEPHONE ENCOUNTER
Called pt and discussed that she had to move her bone scan due to the parades and that she did not get the CSI with ese initially but would now like to get the CSI. Stated I would schedule her an appt  to happen the same day as her scan. Also discussed that Dr. Mccarthy is still reviewing all her imaging and exam notes and presenting at conference and I will be in touch with her after next week on Friday. Pt verbalized understanding and has no further questions.       ----- Message -----  From: Berry Thrasher  Sent: 2/24/2025  12:00 PM CST  To: Shari JUNG Staff  Subject: appointment access                               Pt is calling because pt right leg is in pain . Pt leg is inflamed as well and pt is in pain . Pt will like to speak someone in office about this matter. Chanelle escobar [phone] contact pt with about pt pain in leg .

## 2025-03-10 ENCOUNTER — PATIENT OUTREACH (OUTPATIENT)
Dept: ADMINISTRATIVE | Facility: HOSPITAL | Age: 65
End: 2025-03-10
Payer: COMMERCIAL

## 2025-03-10 DIAGNOSIS — Z12.4 CERVICAL CANCER SCREENING: Primary | ICD-10-CM

## 2025-03-10 DIAGNOSIS — Z12.31 VISIT FOR SCREENING MAMMOGRAM: ICD-10-CM

## 2025-03-10 NOTE — PROGRESS NOTES
Chart reviewed and updated. Reconciled immunizations, health maintenance and care everywhere.  Placed referrals for colonoscopy and MMG.      Bridgette Bay, [facility]  Panel Care Coordinator  [facility]  [phone]  For Paul, Quinton KHALIL MD

## 2025-03-14 ENCOUNTER — CLINICAL SUPPORT (OUTPATIENT)
Dept: ENDOSCOPY | Facility: HOSPITAL | Age: 65
End: 2025-03-14
Attending: STUDENT IN AN ORGANIZED HEALTH CARE EDUCATION/TRAINING PROGRAM
Payer: COMMERCIAL

## 2025-03-14 ENCOUNTER — TELEPHONE (OUTPATIENT)
Dept: ENDOSCOPY | Facility: HOSPITAL | Age: 65
End: 2025-03-14
Payer: COMMERCIAL

## 2025-03-14 DIAGNOSIS — Z12.11 SPECIAL SCREENING FOR MALIGNANT NEOPLASMS, COLON: Primary | ICD-10-CM

## 2025-03-14 DIAGNOSIS — Z12.31 VISIT FOR SCREENING MAMMOGRAM: ICD-10-CM

## 2025-03-14 RX ORDER — POLYETHYLENE GLYCOL 3350, SODIUM SULFATE ANHYDROUS, SODIUM BICARBONATE, SODIUM CHLORIDE, POTASSIUM CHLORIDE 236; 22.74; 6.74; 5.86; 2.97 G/4L; G/4L; G/4L; G/4L; G/4L
4 POWDER, FOR SOLUTION ORAL ONCE
Qty: 4000 ML | Refills: 0 | Status: SHIPPED | OUTPATIENT
Start: 2025-03-14 | End: 2025-03-14

## 2025-03-14 NOTE — TELEPHONE ENCOUNTER
Referral for procedure from PAT appointment      Spoke to patient to schedule procedure(s) Colonoscopy       Physician to perform procedure(s) Dr. Leonard  Date of Procedure (s) 5/7/25  Arrival Time 7:15 AM  Time of Procedure(s) 8:15 AM   Location of Procedure(s) [facility] Floor  Type of Rx Prep sent to patient: PEG  Instructions provided to patient via [facility]    Patient was informed on the following information and verbalized understanding. Screening questionnaire reviewed with patient and complete. If procedure requires anesthesia, a responsible adult needs to be present to accompany the patient home, patient cannot drive after receiving anesthesia. Appointment details are tentative, especially check-in time. Patient will receive a prep-op call 7 days prior to confirm check-in time for procedure. If applicable the patient should contact their pharmacy to verify Rx for procedure prep is ready for pick-up. Patient was advised to call the scheduling department at [phone] if pharmacy states no Rx is available. Patient was advised to call the endoscopy scheduling department if any questions or concerns arise.      [facility] Scheduling Department

## 2025-03-31 ENCOUNTER — TELEPHONE (OUTPATIENT)
Dept: INTERNAL MEDICINE | Facility: CLINIC | Age: 65
End: 2025-03-31
Payer: COMMERCIAL

## 2025-03-31 ENCOUNTER — HOSPITAL ENCOUNTER (OUTPATIENT)
Dept: RADIOLOGY | Facility: CLINIC | Age: 65
Discharge: HOME | End: 2025-03-31
Payer: COMMERCIAL

## 2025-03-31 ENCOUNTER — OFFICE VISIT (OUTPATIENT)
Dept: ORTHOPEDICS | Facility: CLINIC | Age: 65
End: 2025-03-31
Payer: COMMERCIAL

## 2025-03-31 ENCOUNTER — RESULTS FOLLOW-UP (OUTPATIENT)
Dept: INTERNAL MEDICINE | Facility: CLINIC | Age: 65
End: 2025-03-31

## 2025-03-31 DIAGNOSIS — M17.11 PRIMARY OSTEOARTHRITIS OF RIGHT KNEE: Primary | ICD-10-CM

## 2025-03-31 DIAGNOSIS — M84.463D: ICD-10-CM

## 2025-03-31 DIAGNOSIS — D64.9 ANEMIA, UNSPECIFIED TYPE: Primary | ICD-10-CM

## 2025-03-31 DIAGNOSIS — M54.16 LUMBAR RADICULOPATHY, CHRONIC: ICD-10-CM

## 2025-03-31 DIAGNOSIS — E55.9 VITAMIN D DEFICIENCY: ICD-10-CM

## 2025-03-31 PROCEDURE — 99999 PR PBB SHADOW E&M-EST. PATIENT-LVL III: CPT | Mod: PBBFAC,,,

## 2025-03-31 PROCEDURE — 77081 DXA BONE DENSITY APPENDICULR: CPT | Mod: 26,,, | Performed by: INTERNAL MEDICINE

## 2025-03-31 PROCEDURE — 77081 DXA BONE DENSITY APPENDICULR: CPT | Mod: TC

## 2025-03-31 RX ORDER — METHOCARBAMOL 500 MG/1
500 TABLET, FILM COATED ORAL 4 TIMES DAILY
Qty: 40 TABLET | Refills: 0 | Status: SHIPPED | OUTPATIENT
Start: 2025-03-31

## 2025-03-31 RX ORDER — ERGOCALCIFEROL 1.25 MG/1
50000 CAPSULE ORAL
Qty: 8 CAPSULE | Refills: 0 | Status: SHIPPED | OUTPATIENT
Start: 2025-03-31 | End: 2025-05-25 | Stop reason: SDUPTHER

## 2025-03-31 RX ORDER — TRIAMCINOLONE ACETONIDE 40 MG/ML
40 INJECTION, SUSPENSION INTRA-ARTICULAR; INTRAMUSCULAR ONCE
Status: COMPLETED | OUTPATIENT
Start: 2025-03-31 | End: 2025-03-31

## 2025-03-31 RX ADMIN — TRIAMCINOLONE ACETONIDE 40 MG: 40 INJECTION, SUSPENSION INTRA-ARTICULAR; INTRAMUSCULAR at 09:03

## 2025-03-31 NOTE — PROCEDURES
Large Joint Aspiration/Injection: R knee    Date/Time: 3/31/2025 9:20 AM    Performed by: Britton Frederick PA-C  Authorized by: Britton Frederick PA-C    Consent Done?:  Yes (Verbal)  Indications: Right knee pain and osteoarthritis.  Timeout: prior to procedure the correct patient, procedure, and site was verified    Prep: patient was prepped and draped in usual sterile fashion      Local anesthesia used?: Yes    Local anesthetic:  Topical anesthetic    Details:  Needle Size:  22 G  Approach:  Anterolateral  Location:  Knee  Site:  R knee  Medications:  40 mg Knee/Hip CSI  Patient tolerance:  Patient tolerated the procedure well with no immediate complications

## 2025-03-31 NOTE — PROGRESS NOTES
SUBJECTIVE:     Chief Complaint & History of Present Illness:  Chanelle Downey is a 65 y.o. female who is seen here today with complaint of right knee pain.  Patient was previously seen by myself on 2025 in which she denied corticosteroid injection, but she would like to proceed with the injection today..  Patient is currently diagnosed with right knee primary osteoarthritis and would like to receive a right knee intra-articular CSI today.       Past Medical History:   Diagnosis Date    Back pain, chronic     Biliary colic 2018    Calculus of gallbladder without cholecystitis without obstruction 2018    Cervical radiculopathy 3/21/2016    01/17/18--clinic visit, pt describes intermittent BUE numbness/tingling throughout entire arm/hand.  Negative Spurling's test bilaterally.    Cervical stenosis of spinal canal 3/21/2016    Degenerative cervical spinal stenosis 2/10/2016    Depression     Flat back syndrome, postprocedural 2018    Dx 10/2016 when seen in NSGY clinic.  Presents to Neurology clinic 18 for worsening gait, LBP, LLE weakness, and RLE neuropathic pain.    HSV (herpes simplex virus) anogenital infection 2016       Past Surgical History:   Procedure Laterality Date    APPENDECTOMY       SECTION      EYE SURGERY      For strabismus    HYSTERECTOMY      LAPAROSCOPIC CHOLECYSTECTOMY N/A 2018    Procedure: CHOLECYSTECTOMY, LAPAROSCOPIC ;  Surgeon: Yonatan Berry MD;  Location: [facility] OR [address] [place] [address];  Service: General;  Laterality: N/A;    LUMBAR DISCECTOMY      Unknown vertebra    LUMBAR FUSION  2010    L4-S1 fusion reported    TONSILLECTOMY         Family History   Problem Relation Name Age of Onset    Cancer Mother          non hodgkins lymphoma    Alcohol abuse Mother      Arthritis Mother      Depression Mother      Hypertension Mother      Heart disease Mother      Mental illness Daughter 1     Birth defects Daughter 1        Review of patient's  allergies indicates:  No Known Allergies      Current Medications[1]      Review of Systems:  ROS:  The updated medical history is in the chart and has been reviewed. A review of systems is updated and there is no reported vision changes, ear/nose/mouth/throat complaints, chest pain, shortness of breath, abdominal pain, urological complaints, fevers or chills, psychiatric complaints. Musculoskeletal and neurologcial symptoms are as documented. All other systems are negative.      OBJECTIVE:     PHYSICAL EXAM:  There were no vitals taken for this visit.  General: Pleasant, cooperative, NAD.  HEENT: NCAT, sclera nonicteric.  Lungs: Respirations are equal and unlabored.   Abdomen: Soft and non-tender.  CV: 2+ bilateral upper and lower extremity pulses.  Neuro: Sensation intact to light touch.  Skin: Intact throughout LE with no rashes, erythema, or lesions.  Extremities: No LE edema, NVI lower extremities. antalgic gait.    right Knee Exam:  Knee Range of Motion:    Effusion:  Mild  Condition of skin: intact  Location of tenderness: Medial joint line and Lateral joint line   Strength: 5/5 quadriceps strength, 5/5 gastroc-soleus strength, 5/5 hamstring strength, and 5/5 tibialis anterior strength  Stability: stable to testing  Knee Alignment: normal      RADIOGRAPHS:  No further imaging needed today.      ASSESSMENT:       ICD-10-CM ICD-9-CM   1. Primary osteoarthritis of right knee  M17.11 715.16       PLAN:     - Intra-articular corticosteroid injection administered to the right knee today during visit.  - she was advised to rest the knee today, using ice and elevation as needed for comfort and swelling.Immediate relief of the knee pain may be short lived and secondary to the lidocaine. she may have an increase in discomfort tonight followed by steady improvement over the next several days. It may take 1-2 weeks following the injection to get the full benefit of the medication.  - I will reach out to patient  following results of DEXA scan to further correlate proceeding with right total knee replacement with Dr. Mccarthy.    DISCLAIMER: This note was prepared with [employer] voice recognition transcription software. Garbled syntax, mangled pronouns, and other bizarre constructions may be attributed to that software system.    Britton Frederick Jr., PA-C           [1]   Current Outpatient Medications:     aspirin 81 mg Cap, Take 81 mg by mouth Daily., Disp: , Rfl:     atorvastatin (LIPITOR) 40 MG tablet, Take 1 tablet (40 mg total) by mouth once daily., Disp: 90 tablet, Rfl: 3    bumetanide (BUMEX) 0.5 MG Tab, Take 1 tablet (0.5 mg total) by mouth once daily., Disp: 90 tablet, Rfl: 3    ciclopirox (PENLAC) 8 % Soln, Apply topically nightly., Disp: 6.6 mL, Rfl: 11    DULoxetine (CYMBALTA) 60 MG capsule, TAKE ONE CAPSULE BY MOUTH TWICE DAILY, Disp: 180 capsule, Rfl: 2    ergocalciferol, vitamin D2, (VITAMIN D ORAL), Take 1 capsule by mouth Daily., Disp: , Rfl:     gabapentin (NEURONTIN) 100 MG capsule, Take 1 capsule (100 mg total) by mouth 3 (three) times daily., Disp: 90 capsule, Rfl: 11    hydroCHLOROthiazide (HYDRODIURIL) 12.5 MG Tab, TAKE 1 TABLET(12.5 MG) BY MOUTH EVERY DAY, Disp: 90 tablet, Rfl: 3    methadone HCl (METHADONE ORAL), Take 80 mg by mouth once daily., Disp: , Rfl:     methocarbamoL (ROBAXIN) 500 MG Tab, Take 1 tablet (500 mg total) by mouth 4 (four) times daily., Disp: 40 tablet, Rfl: 0    omeprazole (PRILOSEC) 40 MG capsule, TAKE 1 CAPSULE(40 MG) BY MOUTH EVERY DAY, Disp: 90 capsule, Rfl: 3    semaglutide, weight loss, 0.25 mg/0.5 mL PnIj, Inject 0.25 mg into the skin every 7 days. (Patient not taking: Reported on 3/14/2025), Disp: 2 mL, Rfl: 0    Ubidecar/Fish Oil/Omega-3/Jude (CO Q10-FISH OIL-OMEGA 3-E ORAL), Take 1 capsule by mouth Daily., Disp: , Rfl:     valACYclovir (VALTREX) 500 MG tablet, Take 1 tablet (500 mg total) by mouth once daily., Disp: 90 tablet, Rfl: 3    vitamin D (VITAMIN D3) 1000 units  Tab, Take 1,000 Units by mouth once daily., Disp: , Rfl:     ZINC ORAL, Take 1 capsule by mouth Daily., Disp: , Rfl:

## 2025-03-31 NOTE — TELEPHONE ENCOUNTER
----- Message from Valerie Fung NP sent at 3/31/2025  1:05 PM CDT -----  Please call pt with results. Mild anemia again. See if we can add on iron, ferritin, tibc. If not, will need her to get redrawn. Vit D is low again. Is she taking her vit D over the counter? If so,   needs to stop and I sent in prescription vit D. Once prescription runs out, recommend taking 2000 units otc of d3 daily once Rx completed. Rest of her labs look good.   ----- Message -----  From: Lab, Background User  Sent: 3/31/2025  10:35 AM CDT  To: Valerie Fung NP

## 2025-04-28 DIAGNOSIS — M54.16 LUMBAR RADICULOPATHY, CHRONIC: ICD-10-CM

## 2025-04-28 RX ORDER — METHOCARBAMOL 500 MG/1
500 TABLET, FILM COATED ORAL 4 TIMES DAILY
Qty: 40 TABLET | Refills: 0 | Status: SHIPPED | OUTPATIENT
Start: 2025-04-28

## 2025-04-29 ENCOUNTER — PATIENT MESSAGE (OUTPATIENT)
Dept: INTERNAL MEDICINE | Facility: CLINIC | Age: 65
End: 2025-04-29
Payer: COMMERCIAL

## 2025-05-06 DIAGNOSIS — M17.11 PRIMARY OSTEOARTHRITIS OF RIGHT KNEE: Primary | ICD-10-CM

## 2025-05-06 DIAGNOSIS — M17.12 PRIMARY OSTEOARTHRITIS OF LEFT KNEE: ICD-10-CM

## 2025-05-07 ENCOUNTER — LAB VISIT (OUTPATIENT)
Dept: LAB | Facility: HOSPITAL | Age: 65
End: 2025-05-07
Payer: COMMERCIAL

## 2025-05-07 ENCOUNTER — OFFICE VISIT (OUTPATIENT)
Dept: ORTHOPEDICS | Facility: CLINIC | Age: 65
End: 2025-05-07
Payer: COMMERCIAL

## 2025-05-07 ENCOUNTER — HOSPITAL ENCOUNTER (OUTPATIENT)
Dept: RADIOLOGY | Facility: HOSPITAL | Age: 65
Discharge: HOME | End: 2025-05-07
Payer: COMMERCIAL

## 2025-05-07 VITALS — BODY MASS INDEX: 37.55 KG/M2 | HEIGHT: 62 IN | WEIGHT: 204.06 LBS

## 2025-05-07 DIAGNOSIS — M80.80XA PATHOLOGICAL FRACTURE DUE TO OSTEOPOROSIS, UNSPECIFIED FRACTURE SITE, UNSPECIFIED OSTEOPOROSIS TYPE, INITIAL ENCOUNTER: ICD-10-CM

## 2025-05-07 DIAGNOSIS — M81.0 OSTEOPOROSIS, UNSPECIFIED OSTEOPOROSIS TYPE, UNSPECIFIED PATHOLOGICAL FRACTURE PRESENCE: ICD-10-CM

## 2025-05-07 DIAGNOSIS — M81.6 LOCALIZED OSTEOPOROSIS OF LEQUESNE: ICD-10-CM

## 2025-05-07 DIAGNOSIS — M80.80XA PATHOLOGICAL FRACTURE DUE TO OSTEOPOROSIS, UNSPECIFIED FRACTURE SITE, UNSPECIFIED OSTEOPOROSIS TYPE, INITIAL ENCOUNTER: Primary | ICD-10-CM

## 2025-05-07 DIAGNOSIS — M80.00XA AGE-RELATED OSTEOPOROSIS WITH CURRENT PATHOLOGICAL FRACTURE, INITIAL ENCOUNTER: ICD-10-CM

## 2025-05-07 DIAGNOSIS — M17.11 PRIMARY OSTEOARTHRITIS OF RIGHT KNEE: Primary | ICD-10-CM

## 2025-05-07 DIAGNOSIS — M17.12 PRIMARY OSTEOARTHRITIS OF LEFT KNEE: ICD-10-CM

## 2025-05-07 DIAGNOSIS — M17.11 PRIMARY OSTEOARTHRITIS OF RIGHT KNEE: ICD-10-CM

## 2025-05-07 LAB
25(OH)D3+25(OH)D2 SERPL-MCNC: 19 NG/ML (ref 30–96)
ALBUMIN SERPL BCP-MCNC: 3.4 G/DL (ref 3.5–5.2)
ALP SERPL-CCNC: 112 UNIT/L (ref 40–150)
ALT SERPL W/O P-5'-P-CCNC: 15 UNIT/L (ref 10–44)
ANION GAP (OHS): 7 MMOL/L (ref 8–16)
AST SERPL-CCNC: 18 UNIT/L (ref 11–45)
BILIRUB SERPL-MCNC: 0.2 MG/DL (ref 0.1–1)
BUN SERPL-MCNC: 15 MG/DL (ref 8–23)
CALCIUM SERPL-MCNC: 9.1 MG/DL (ref 8.7–10.5)
CHLORIDE SERPL-SCNC: 105 MMOL/L (ref 95–110)
CO2 SERPL-SCNC: 28 MMOL/L (ref 23–29)
CREAT SERPL-MCNC: 0.7 MG/DL (ref 0.5–1.4)
GFR SERPLBLD CREATININE-BSD FMLA CKD-EPI: >60 ML/MIN/1.73/M2
GLUCOSE SERPL-MCNC: 62 MG/DL (ref 70–110)
POTASSIUM SERPL-SCNC: 3.7 MMOL/L (ref 3.5–5.1)
PROT SERPL-MCNC: 7.4 GM/DL (ref 6–8.4)
PTH-INTACT SERPL-MCNC: 55.8 PG/ML (ref 9–77)
SODIUM SERPL-SCNC: 140 MMOL/L (ref 136–145)

## 2025-05-07 PROCEDURE — 99999 PR PBB SHADOW E&M-EST. PATIENT-LVL III: CPT | Mod: PBBFAC,,, | Performed by: ORTHOPAEDIC SURGERY

## 2025-05-07 PROCEDURE — 3008F BODY MASS INDEX DOCD: CPT | Mod: CPTII,S$GLB,, | Performed by: ORTHOPAEDIC SURGERY

## 2025-05-07 PROCEDURE — 3044F HG A1C LEVEL LT 7.0%: CPT | Mod: CPTII,S$GLB,, | Performed by: ORTHOPAEDIC SURGERY

## 2025-05-07 PROCEDURE — 1159F MED LIST DOCD IN RCRD: CPT | Mod: CPTII,S$GLB,, | Performed by: ORTHOPAEDIC SURGERY

## 2025-05-07 PROCEDURE — 84075 ASSAY ALKALINE PHOSPHATASE: CPT

## 2025-05-07 PROCEDURE — 82306 VITAMIN D 25 HYDROXY: CPT

## 2025-05-07 PROCEDURE — 73562 X-RAY EXAM OF KNEE 3: CPT | Mod: 26,50,, | Performed by: RADIOLOGY

## 2025-05-07 PROCEDURE — 99999 PR PBB SHADOW E&M-EST. PATIENT-LVL III: CPT | Mod: PBBFAC,,, | Performed by: PHYSICIAN ASSISTANT

## 2025-05-07 PROCEDURE — 99214 OFFICE O/P EST MOD 30 MIN: CPT | Mod: S$GLB,,, | Performed by: ORTHOPAEDIC SURGERY

## 2025-05-07 PROCEDURE — 36415 COLL VENOUS BLD VENIPUNCTURE: CPT

## 2025-05-07 PROCEDURE — 73562 X-RAY EXAM OF KNEE 3: CPT | Mod: TC,50

## 2025-05-07 PROCEDURE — 83970 ASSAY OF PARATHORMONE: CPT

## 2025-05-07 PROCEDURE — 80053 COMPREHEN METABOLIC PANEL: CPT

## 2025-05-07 RX ORDER — TERIPARATIDE 250 UG/ML
20 INJECTION, SOLUTION SUBCUTANEOUS DAILY
Qty: 2.4 ML | Refills: 11 | Status: SHIPPED | OUTPATIENT
Start: 2025-05-07 | End: 2026-05-07

## 2025-05-07 NOTE — PROGRESS NOTES
SUBJECTIVE:     Chief Complaint & History of Present Illness:  Chanelle Downey is a new patient 65 y.o. female who is seen here today for a bone health evaluation for osteoporosis, fracture prevention, and risk evaluation for future fractures.   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  This visit is medically necessary to identify risk, investigate and initiative treatment as appropriate to improve bone quality and strength for the reduction of secondary fractures.     her medical history, medications and fracture history will be reviewed and summarized      Review of patient's allergies indicates:  No Known Allergies      Current Medications[1]    Past Medical History:   Diagnosis Date    Back pain, chronic     Biliary colic 6/11/2018    Calculus of gallbladder without cholecystitis without obstruction 5/14/2018    Cervical radiculopathy 3/21/2016    01/17/18--clinic visit, pt describes intermittent BUE numbness/tingling throughout entire arm/hand.  Negative Spurling's test bilaterally.    Cervical stenosis of spinal canal 3/21/2016    Degenerative cervical  "spinal stenosis 2/10/2016    Depression     Flat back syndrome, postprocedural 2018    Dx 10/2016 when seen in NSGY clinic.  Presents to Neurology clinic 18 for worsening gait, LBP, LLE weakness, and RLE neuropathic pain.    HSV (herpes simplex virus) anogenital infection 2016       Past Surgical History:   Procedure Laterality Date    APPENDECTOMY       SECTION      EYE SURGERY  1967    For strabismus    HYSTERECTOMY      LAPAROSCOPIC CHOLECYSTECTOMY N/A 2018    Procedure: CHOLECYSTECTOMY, LAPAROSCOPIC ;  Surgeon: Yonatan Berry MD;  Location: [facility] OR [address] [place] [address];  Service: General;  Laterality: N/A;    LUMBAR DISCECTOMY      Unknown vertebra    LUMBAR FUSION  2010    L4-S1 fusion reported    TONSILLECTOMY         Lab Results   Component Value Date     2025    K 4.4 2025     2025    CO2 29 2025    GLU 85 2025    BUN 9 2025    CREATININE 0.6 2025    CALCIUM 9.0 2025    PROT 7.7 2025    ALBUMIN 3.5 2025    BILITOT 0.4 2025    ALKPHOS 109 2025    AST 20 2025    ALT 15 2025    ANIONGAP 7 (L) 2025    ESTGFRAFRICA >60.0 2021    EGFRNONAA >60.0 2021      Lab Results   Component Value Date    WBC 7.70 2025    RBC 3.74 (L) 2025    HGB 11.4 (L) 2025    HCT 35.6 (L) 2025    MCV 95 2025    MCH 30.5 2025    MCHC 32.0 2025    RDW 13.8 2025     2025    MPV 13.4 (H) 2025    GRAN 4.3 2024    GRAN 63.6 2024    LYMPH 29.4 2025    LYMPH 2.26 2025    MONO 6.6 2025    MONO 0.51 2025    EOS 4.4 2025    EOS 0.34 2025    BASO 0.02 2024    EOSINOPHIL 3.9 2024    BASOPHIL 0.4 2025    BASOPHIL 0.03 2025    DIFFMETHOD Automated 2024      No results found for: "MG"   Lab Results   Component Value Date    PHOS 4.5 2016      Lab Results   Component Value " "Date    TZGSANIB36OH 13 (L) 03/31/2025      Lab Results   Component Value Date    PTH 69.0 03/18/2016      Lab Results   Component Value Date    TSH 3.053 03/31/2025      Lab Results   Component Value Date    FREET4 1.13 03/31/2025      Lab Results   Component Value Date    HGBA1C 4.9 03/31/2025    ESTIMATEDAVG 94 03/31/2025      No results found for: "FREETESTOSTE"         Vital Signs (Most Recent)  There were no vitals filed for this visit.      Today's Visit and Bone Health History  Question 5/7/2025  4:20 PM CDT - Filed by Natalie Wisdom MA   Have you had any loss of height or gotten shorter since your 20's? 1.5   Are you postmenopausal? Yes   Please indicate how menopause occured. Surgical hysterectomy with ovaries removed   Please indicate at what age you became postmenopausal. 47   Have you ever taken any type of hormone replacement therapy? No   Do you currently smoke? Yes   If yes, how many years? 25   How many alcoholic beverages do you drink per day? 0   How many caffeinated beverages do you drink per day? 1 to 3   Have you had 2 or more falls in the past 12 months? Yes   How active have you been in the past 12 months? Not active ( in the house only )   Did either of your parents have a hip fracture after the age of 50? No   Have you ever been diagnosed with any of the following? Vitamin D Deficiency    Fracture    GERD   Do you currently have a fracture? Yes   If you currently have a fracture please indicate where and when the fracture occured. rt. tibia   Have you broken any other bones since you turned 52 or older? No   Have you ever had a bone density test or DXA scan? Yes   Are you currently taking or have you ever taken any of the following medications? Anticonvulsants (Gabapentin, Lyrica)    PPI's (Nexium, Prilosec)   Do you take Calcium? Yes   If you take Calcium what dose? n/a   Do you take Vitamin D? Yes   If you take Vitamin D what dose? 50,000 ui  weekly   Have you ever been diagnosed with " cancer? No   Have you ever been treated for cancer with high beam radiation or had radioactive implants? No            she has medical history and medication use known to be associated with bone loss and osteoporosis to include pathological fibula fracture, osteoporosis by DEXA scan, GERD, vitamin-D deficiency early-onset surgical menopause by hysterectomy age 47.  Opioid pain medications, tobacco abuse    The last DXA was performed in 02/10/2025.          T-Score forearm: -2.7         Review of Systems:  ROS:  Constitutional: no fever or chills, positive nicotine dependence, edema bilateral lower extremities gait instability lymphedema bilateral lower extremities  Eyes: no visual changes  ENT: no nasal congestion or sore throat  Respiratory: no respiratory symptoms  Cardiovascular: no chest pain or palpitations, positive for hypertension, palpitations varicose veins bilateral lower extremities  Gastrointestinal: no nausea or vomiting, tolerating diet, positive for GERD  Genitourinary: no hematuria or dysuria  Integument/Breast: no rash or pruritis  Hematologic/Lymphatic: no easy bruising or lymphadenopathy  Musculoskeletal: no arthralgias or myalgias, chronic left knee pain, flat back syndrome, sagittal plane imbalance  Neurological: no seizures or tremors, positive cervical myopathy, cervical radiculopathy, cervical stenosis of the spinal canal, chronic pain syndrome, paresthesias of both lower extremities  Behavioral/Psych: no auditory or visual hallucinations, depression, methadone dependence  Endocrine: no heat or cold intolerance, morbid obesity BMI 37.3 to      OBJECTIVE:     PHYSICAL EXAM:     ,                  General: no acute distress  Behavioral/Psych: normal judgment and insight  Skin: no rash  Head/Neck: atraumatic, normocephalic, without obvious abnormality  Respiratory: normal respiratory effort  Cardiac: regular rate and rhythm  Vascular: pulses present  Abdomen: soft, non-tender  Musculoskeletal:  no joint tenderness, deformity or swelling               ASSESSMENT/PLAN:     Assessment:    Osteoporosis, at high risk for future fractures, history of pathological fibula fracture.    Plan:   30-45 minutes spent in face-to-face consultation with patient and her family members today, discussing the disease management of osteoporosis, for the reduction of future fractures.  I have explained that bone strength is equal to bone quality. A bone density / DEXA scan is important to complement her care since her fracture was by definition a fragility fracture/traumatic fracture.  Over half of the encounter was spent (50%) counseling the patient on the disease of osteoporosis evidence-based and best practice treatment options available as well as recommendations for improvement of bone quality, the importance of nutritional supplements to include:  Calcium 2194-4203 mg daily in divided doses   Vitamin D3  8490-6149 units daily in divided doses.   Fall prevention and personal safety for the reduction of future fractures.    Clinicians Guidelines for the treatment of Osteoporosis 2023 as part of the National Osteoporosis Foundation were utilized as the evidence-based information provided.    Discussed pharmaceutical treatment options to include Biphosphatases, Denosumab, Abaloparatide and Teriparatide. Information to include indications for therapy, risks and benefits to treatment, and important safety information related to these treatments was provided and discussed.  Handouts were given to the patient for her review on osteoporosis and other pharmacological treatment information related to other available treatment options.    The importance of diet, impact exercise, and core strengthening with gait and balance exercise, through  both formal physical therapy programs and home exercise programs was discussed.     Bone health labs recommended and ordered    Assessment & Plan    M81.0 Age-related osteoporosis without current  pathological fracture  M17.9 Osteoarthritis of knee, unspecified  M54.50 Low back pain, unspecified  S72.90XS Unspecified fracture of unspecified femur, sequela  R26.2 Difficulty in walking, not elsewhere classified  Z86.711 Personal history of pulmonary embolism  E55.9 Vitamin D deficiency, unspecified    MEDICATIONS:  - Continue vitamin D 50,000 units.    PROCEDURES:  - Ordered labs to check parathyroid level and other markers.  - Recommend starting Forteo or Tymlos (daily injection for 2 years) to stimulate bone growth and reduce fracture risk.  - Discussed Evenity (2 shots in the arm once a month for 1 year) as an alternative treatment option.  - Explained the black box warning for osteosarcoma associated with Forteo and Tymlos.    FOLLOW UP:  - Follow up in 1-1.5 weeks for treatment plan initiation.    PATIENT INSTRUCTIONS:  - Review provided folder containing detailed information on osteoporosis and treatment options.  - Incorporate calcium-rich foods into diet as listed in provided food guide.                [1]   Current Outpatient Medications   Medication Sig Dispense Refill    aspirin 81 mg Cap Take 81 mg by mouth Daily.      atorvastatin (LIPITOR) 40 MG tablet Take 1 tablet (40 mg total) by mouth once daily. 90 tablet 3    bumetanide (BUMEX) 0.5 MG Tab Take 1 tablet (0.5 mg total) by mouth once daily. 90 tablet 3    ciclopirox (PENLAC) 8 % Soln Apply topically nightly. 6.6 mL 11    DULoxetine (CYMBALTA) 60 MG capsule TAKE ONE CAPSULE BY MOUTH TWICE DAILY 180 capsule 2    ergocalciferol (ERGOCALCIFEROL) 50,000 unit Cap Take 1 capsule (50,000 Units total) by mouth every 7 days. 8 capsule 0    ergocalciferol, vitamin D2, (VITAMIN D ORAL) Take 1 capsule by mouth Daily.      gabapentin (NEURONTIN) 100 MG capsule Take 1 capsule (100 mg total) by mouth 3 (three) times daily. 90 capsule 11    hydroCHLOROthiazide (HYDRODIURIL) 12.5 MG Tab TAKE 1 TABLET(12.5 MG) BY MOUTH EVERY DAY 90 tablet 3    methadone HCl  (METHADONE ORAL) Take 80 mg by mouth once daily.      methocarbamoL (ROBAXIN) 500 MG Tab Take 1 tablet (500 mg total) by mouth 4 (four) times daily. 40 tablet 0    omeprazole (PRILOSEC) 40 MG capsule TAKE 1 CAPSULE(40 MG) BY MOUTH EVERY DAY 90 capsule 3    semaglutide, weight loss, 0.25 mg/0.5 mL PnIj Inject 0.25 mg into the skin every 7 days. (Patient not taking: Reported on 3/14/2025) 2 mL 0    teriparatide (FORTEO) 20 mcg/dose (560mcg/2.24mL) PnIj Inject 0.08 mLs (20 mcg total) into the skin once daily. 2.4 mL 11    Ubidecar/Fish Oil/Omega-3/Jude (CO Q10-FISH OIL-OMEGA 3-E ORAL) Take 1 capsule by mouth Daily.      valACYclovir (VALTREX) 500 MG tablet Take 1 tablet (500 mg total) by mouth once daily. 90 tablet 3    vitamin D (VITAMIN D3) 1000 units Tab Take 1,000 Units by mouth once daily.      ZINC ORAL Take 1 capsule by mouth Daily.       No current facility-administered medications for this visit.

## 2025-05-07 NOTE — PROGRESS NOTES
Subjective:      Patient ID: Chanelle Downey is a 65 y.o. female.    Chief Complaint: Pain of the Right Knee and Pain of the Left Knee      History of Present Illness    CHIEF COMPLAINT:  - Bilateral knee pain, right worse than left.    HPI:  - Presents for follow-up regarding bilateral knee pain, right knee more symptomatic  - Reports diffuse pain throughout both knees, radiating up and down her leg  - Right knee more painful than the left  - Experiences locking, giving out, and catching sensations in the knee  - Previously received knee injection, providing relief for about two weeks before pain returned  - Had a visit in February, followed by a DEXA scan revealing osteoporosis  - Primary care NP, Valerie Fung, prescribed Vitamin D for osteoporosis management  - Attempting to lose weight, reports small amount of weight loss    PREVIOUS TREATMENTS:  - Corticosteroid injection in the right knee: Approximately two weeks ago, provided benefit for about two weeks before pain returned.         Review of Systems   Constitutional: Negative for chills, fever and night sweats.   HENT:  Negative for hearing loss.    Eyes:  Negative for blurred vision and double vision.   Cardiovascular:  Negative for chest pain, claudication and leg swelling.   Respiratory:  Negative for shortness of breath.    Endocrine: Negative for polydipsia, polyphagia and polyuria.   Hematologic/Lymphatic: Negative for adenopathy and bleeding problem. Does not bruise/bleed easily.   Skin:  Negative for poor wound healing.   Gastrointestinal:  Negative for diarrhea and heartburn.   Genitourinary:  Negative for bladder incontinence.   Neurological:  Negative for focal weakness, headaches, numbness, paresthesias and sensory change.   Psychiatric/Behavioral:  The patient is not nervous/anxious.    Allergic/Immunologic: Negative for persistent infections.         Objective:      Body mass index is 37.32 kg/m².  Vitals:    05/07/25 1502   Weight: 92.5 kg  "(204 lb 0.6 oz)   Height: 5' 2" (1.575 m)           General    Constitutional: She is oriented to person, place, and time. She appears well-developed and well-nourished.   HENT:   Head: Normocephalic and atraumatic.   Eyes: EOM are normal.   Cardiovascular:  Normal rate.            Pulmonary/Chest: Effort normal.   Neurological: She is alert and oriented to person, place, and time.   Psychiatric: She has a normal mood and affect. Her behavior is normal.           Right Knee Exam     Inspection   Erythema: absent  Scars: absent  Swelling: present  Effusion: absent  Deformity: absent  Bruising: absent    Tenderness   The patient is tender to palpation of the medial joint line.    Crepitus   The patient has crepitus of the patella.    Range of Motion   Extension:  10   Flexion:  80     Tests   Ligament Examination   Lachman: normal (-1 to 2mm)   MCL - Valgus: normal (0 to 2mm)  LCL - Varus: normal          Obtained today and reviewed by me demonstrate severe arthritic change right knee.  There is healing fibula fracture.                Assessment:       Encounter Diagnoses   Name Primary?    Primary osteoarthritis of right knee Yes    Age-related osteoporosis with current pathological fracture, initial encounter           Plan:       Chanelle was seen today for pain and pain.    Diagnoses and all orders for this visit:    Primary osteoarthritis of right knee    Age-related osteoporosis with current pathological fracture, initial encounter      Assessment & Plan            She is indeed a candidate for total knee arthroplasty however we need to get the osteoporosis managed.  We will send her to [facility] to consider placing on an anabolic agent.  I will see her back in a few weeks.      I have discussed this with him.    This note was generated with the assistance of ambient listening technology. Verbal consent was obtained by the patient and accompanying visitor(s) for the recording of patient appointment to facilitate this " note. I attest to having reviewed and edited the generated note for accuracy, though some syntax or spelling errors may persist. Please contact the author of this note for any clarification.

## 2025-05-13 PROBLEM — M81.8 OTHER OSTEOPOROSIS WITHOUT CURRENT PATHOLOGICAL FRACTURE: Status: ACTIVE | Noted: 2025-05-13

## 2025-05-14 LAB — W ALKALINE PHOSPHATASE, BONE: 16 UG/L

## 2025-05-15 ENCOUNTER — PATIENT MESSAGE (OUTPATIENT)
Dept: ORTHOPEDICS | Facility: CLINIC | Age: 65
End: 2025-05-15
Payer: COMMERCIAL

## 2025-05-16 ENCOUNTER — TELEPHONE (OUTPATIENT)
Dept: ENDOSCOPY | Facility: HOSPITAL | Age: 65
End: 2025-05-16
Payer: COMMERCIAL

## 2025-05-25 DIAGNOSIS — E55.9 VITAMIN D DEFICIENCY: ICD-10-CM

## 2025-05-26 NOTE — TELEPHONE ENCOUNTER
Refill Routing Note   Medication(s) are not appropriate for processing by [facility] for the following reason(s):        Outside of protocol  No active prescription written by provider    [facility] action(s):  Route               Appointments  past 12m or future 3m with PCP    Date Provider   Last Visit   8/1/2024 Quinton Miller MD   Next Visit   8/15/2025 Quinton Miller MD   ED visits in past 90 days: 0        Note composed:2:46 PM 05/26/2025

## 2025-05-26 NOTE — TELEPHONE ENCOUNTER
No care due was identified.  [facility] Embedded Care Due Messages. Reference number: [id number].   5/26/2025 2:22:06 PM CDT

## 2025-05-27 RX ORDER — ERGOCALCIFEROL 1.25 MG/1
50000 CAPSULE ORAL
Qty: 8 CAPSULE | Refills: 0 | Status: SHIPPED | OUTPATIENT
Start: 2025-05-27

## 2025-06-02 DIAGNOSIS — M54.16 LUMBAR RADICULOPATHY, CHRONIC: ICD-10-CM

## 2025-06-02 DIAGNOSIS — E55.9 VITAMIN D DEFICIENCY: ICD-10-CM

## 2025-06-02 RX ORDER — METHOCARBAMOL 500 MG/1
500 TABLET, FILM COATED ORAL 4 TIMES DAILY
Qty: 40 TABLET | Refills: 0 | Status: SHIPPED | OUTPATIENT
Start: 2025-06-02

## 2025-06-03 RX ORDER — ERGOCALCIFEROL 1.25 MG/1
50000 CAPSULE ORAL
Qty: 8 CAPSULE | Refills: 0 | Status: SHIPPED | OUTPATIENT
Start: 2025-06-03

## 2025-06-11 ENCOUNTER — OFFICE VISIT (OUTPATIENT)
Dept: ORTHOPEDICS | Facility: CLINIC | Age: 65
End: 2025-06-11
Payer: COMMERCIAL

## 2025-06-11 ENCOUNTER — HOSPITAL ENCOUNTER (OUTPATIENT)
Dept: RADIOLOGY | Facility: HOSPITAL | Age: 65
Discharge: HOME | End: 2025-06-11
Attending: ORTHOPAEDIC SURGERY
Payer: COMMERCIAL

## 2025-06-11 VITALS — BODY MASS INDEX: 36.26 KG/M2 | WEIGHT: 197.06 LBS | HEIGHT: 62 IN

## 2025-06-11 DIAGNOSIS — M25.552 BILATERAL HIP PAIN: ICD-10-CM

## 2025-06-11 DIAGNOSIS — M25.551 BILATERAL HIP PAIN: ICD-10-CM

## 2025-06-11 DIAGNOSIS — M17.11 PRIMARY OSTEOARTHRITIS OF RIGHT KNEE: Primary | ICD-10-CM

## 2025-06-11 DIAGNOSIS — M25.552 BILATERAL HIP PAIN: Primary | ICD-10-CM

## 2025-06-11 DIAGNOSIS — M25.551 BILATERAL HIP PAIN: Primary | ICD-10-CM

## 2025-06-11 PROCEDURE — 3008F BODY MASS INDEX DOCD: CPT | Mod: CPTII,S$GLB,, | Performed by: ORTHOPAEDIC SURGERY

## 2025-06-11 PROCEDURE — 3044F HG A1C LEVEL LT 7.0%: CPT | Mod: CPTII,S$GLB,, | Performed by: ORTHOPAEDIC SURGERY

## 2025-06-11 PROCEDURE — 72170 X-RAY EXAM OF PELVIS: CPT | Mod: TC

## 2025-06-11 PROCEDURE — 72170 X-RAY EXAM OF PELVIS: CPT | Mod: 26,,, | Performed by: RADIOLOGY

## 2025-06-11 PROCEDURE — 1159F MED LIST DOCD IN RCRD: CPT | Mod: CPTII,S$GLB,, | Performed by: ORTHOPAEDIC SURGERY

## 2025-06-11 PROCEDURE — 99999 PR PBB SHADOW E&M-EST. PATIENT-LVL III: CPT | Mod: PBBFAC,,, | Performed by: ORTHOPAEDIC SURGERY

## 2025-06-11 PROCEDURE — 99214 OFFICE O/P EST MOD 30 MIN: CPT | Mod: S$GLB,,, | Performed by: ORTHOPAEDIC SURGERY

## 2025-06-11 NOTE — PROGRESS NOTES
Subjective:      Patient ID: Chanelle Downey is a 65 y.o. female.    Chief Complaint: Pain of the Right Knee and Pain of the Left Knee      History of Present Illness    CHIEF COMPLAINT:  - Bilateral knee pain, right more symptomatic    HPI:  - Presents with severe bilateral knee pain, right more symptomatic than left  - Pain intensity varies and worsens with prolonged standing  - Pain affects sleep, forcing her to sleep in a chair with one leg hanging down for comfort  - Right knee described as having spasms and frequent clicking  - Demonstrates clicking, noting it occurs with minimal movement  - Uses a cane for ambulation  - Reports difficulty with activities of daily living, including stairs, getting out of a car, and dressing  - Taking daily injections for osteoporosis for approximately three weeks  - Reports recent weight loss       Previous note:  History of Present Illness    CHIEF COMPLAINT:  - Bilateral knee pain, right worse than left.     HPI:  - Presents for follow-up regarding bilateral knee pain, right knee more symptomatic  - Reports diffuse pain throughout both knees, radiating up and down her leg  - Right knee more painful than the left  - Experiences locking, giving out, and catching sensations in the knee  - Previously received knee injection, providing relief for about two weeks before pain returned  - Had a visit in February, followed by a DEXA scan revealing osteoporosis  - Primary care NP, Valerie Fung, prescribed Vitamin D for osteoporosis management  - Attempting to lose weight, reports small amount of weight loss     PREVIOUS TREATMENTS:  - Corticosteroid injection in the right knee: Approximately two weeks ago, provided benefit for about two weeks before pain returned.       Review of Systems   Constitutional: Negative for chills, fever and night sweats.   HENT:  Negative for hearing loss.    Eyes:  Negative for blurred vision and double vision.   Cardiovascular:  Negative for chest  "pain, claudication and leg swelling.   Respiratory:  Negative for shortness of breath.    Endocrine: Negative for polydipsia, polyphagia and polyuria.   Hematologic/Lymphatic: Negative for adenopathy and bleeding problem. Does not bruise/bleed easily.   Skin:  Negative for poor wound healing.   Gastrointestinal:  Negative for diarrhea and heartburn.   Genitourinary:  Negative for bladder incontinence.   Neurological:  Negative for focal weakness, headaches, numbness, paresthesias and sensory change.   Psychiatric/Behavioral:  The patient is not nervous/anxious.    Allergic/Immunologic: Negative for persistent infections.         Objective:      Body mass index is 36.05 kg/m².  Vitals:    25 1508   Weight: 89.4 kg (197 lb 1.5 oz)   Height: 5' 2" (1.575 m)               General Musculoskeletal Exam   Gait: abnormal and antalgic       Right Knee Exam     Inspection   Erythema: absent  Scars: absent  Swelling: present  Effusion: absent  Deformity: present (varus)  Bruising: absent    Tenderness   The patient is tender to palpation of the medial joint line and lateral joint line.    Crepitus   The patient has crepitus of the patella.    Range of Motion   Extension:  5   Flexion:  100     Tests   Ligament Examination   Lachman: normal (-1 to 2mm)   MCL - Valgus: normal (0 to 2mm)  LCL - Varus: normal    Other   Popliteal (Baker's) Cyst: present    Muscle Strength   Right Lower Extremity   Hip Abduction: 5/5   Quadriceps:  4/5   Hamstrin/5     Vascular Exam       Edema  Right Lower Leg: absent      Physical Exam    IMAGING:  - XR Bilateral Knee obtained in May, demonstrate severe Kellgren-Nikhil four tricompartmental arthritic change.       See prior note for more detail        Assessment:       Encounter Diagnosis   Name Primary?    Primary osteoarthritis of right knee Yes          Plan:       Chanelle was seen today for pain and pain.    Diagnoses and all orders for this visit:    Primary osteoarthritis of right " knee  -     Ambulatory Referral/Consult to Physical Therapy      Assessment & Plan            Treatment options were discussed. The surgical process of robotically assisted right knee replacement was discussed in detail with the patient including a detailed discussion of the procedure itself (including visual model, x-ray review, and literature review). We discussed options including implant type and why I believe the implant selected is a good match for the patient's needs. The patient expressed understanding and agrees to proceed with the planned surgeryThe typical perioperative and post-operative course was discussed and perioperative risks were discussed to the patient's satisfaction.  Risks and complications discussed included but were not limited to the risks of anesthetic complications, infection, bleeding, wound healing complications, fracture through the pin sights, stiffness, aseptic loosening, instability, limb length inequality, neurologic dysfunction including numbness and weakness, additional surgery,  DVT, pulmonary embolism, perioperative medical risks (cardiac, pulmonary, renal, neurologic), and death and the patient elects to proceed.  The patient should get medically cleared and attend the joint seminar.      ASA for DVT prophylaxis  Extended pre-hab  Tobacco Cessation  Extended PO abx    Same day            This note was generated with the assistance of ambient listening technology. Verbal consent was obtained by the patient and accompanying visitor(s) for the recording of patient appointment to facilitate this note. I attest to having reviewed and edited the generated note for accuracy, though some syntax or spelling errors may persist. Please contact the author of this note for any clarification.

## 2025-06-26 ENCOUNTER — TELEPHONE (OUTPATIENT)
Dept: SMOKING CESSATION | Facility: CLINIC | Age: 65
End: 2025-06-26
Payer: COMMERCIAL

## 2025-06-26 NOTE — TELEPHONE ENCOUNTER
[facility]- called patient after 12 min no show for in office intake appointment. Left detailed message for pt to call back and reschedule.

## 2025-06-28 DIAGNOSIS — M54.16 LUMBAR RADICULOPATHY, CHRONIC: ICD-10-CM

## 2025-06-28 DIAGNOSIS — M50.30 DDD (DEGENERATIVE DISC DISEASE), CERVICAL: ICD-10-CM

## 2025-06-29 RX ORDER — GABAPENTIN 100 MG/1
100 CAPSULE ORAL 3 TIMES DAILY
Qty: 90 CAPSULE | Refills: 11 | OUTPATIENT
Start: 2025-06-29

## 2025-06-30 ENCOUNTER — CLINICAL SUPPORT (OUTPATIENT)
Dept: REHABILITATION | Facility: OTHER | Age: 65
End: 2025-06-30
Attending: ORTHOPAEDIC SURGERY
Payer: COMMERCIAL

## 2025-06-30 DIAGNOSIS — Z74.09 DECREASED FUNCTIONAL MOBILITY AND ENDURANCE: Primary | ICD-10-CM

## 2025-06-30 PROCEDURE — 97161 PT EVAL LOW COMPLEX 20 MIN: CPT | Mod: PN

## 2025-06-30 PROCEDURE — 97110 THERAPEUTIC EXERCISES: CPT | Mod: PN

## 2025-06-30 NOTE — PROGRESS NOTES
Outpatient Rehab    Physical Therapy Evaluation    Patient Name: Chanelle Downey  MRN: [id number]  YOB: 1960  Encounter Date: 6/30/2025    Therapy Diagnosis:   Encounter Diagnosis   Name Primary?    Decreased functional mobility and endurance Yes     Physician: Yonatan Mccarthy MD    Physician Orders: Eval and Treat  Medical Diagnosis: Primary osteoarthritis of right knee  Surgical Diagnosis: Not applicable for this Episode   Surgical Date: Not applicable for this Episode  Days Since Last Surgery: Not applicable for this Episode    Visit # / Visits Authorized:  1 / 1  Insurance Authorization Period: 6/11/2025 to 6/11/2026  Date of Evaluation: 6/30/2025  Plan of Care Certification: 6/30/2025 to 9/30/2025     Time In: 0800   Time Out: 0900  Total Time (in minutes): 60   Total Billable Time (in minutes): 60    Intake Outcome Measure for FOTO Survey    Therapist reviewed FOTO scores for Chanelle Downey on 6/30/2025.   FOTO report - see Media section or FOTO account episode details.     Intake Score:  (see media in epic)%    Precautions:       Subjective   History of Present Illness  Chanelle is a 65 y.o. female who reports to physical therapy with a chief concern of HPI:  - Presents for follow-up regarding bilateral knee pain, right knee more symptomatic  - Reports diffuse pain throughout both knees, radiating up and down her leg  - Right knee more painful than the left  - Experiences locking, giving out, and catching sensations in the knee  - Previously received knee injection, providing relief for about two weeks before pain returned  - Had a visit in February, followed by a DEXA scan revealing osteoporosis  - Primary care NP, Valerie Fung, prescribed Vitamin D for osteoporosis management  - Attempting to lose weight, reports small amount of weight loss     PREVIOUS TREATMENTS:  - Corticosteroid injection in the right knee: Approximately two weeks ago, provided benefit for about two weeks before  Patient has viewed result in [facility]. Advised as per MD note and to call if any questions.    pain returned..                 History of Present Condition/Illness: Pt stated that her goal for PT is to reduce her pain levels and gain strength in RLE before her knee replacement surgery in 2 months. Pt states that she is unable to do house work, use stairs, and walk without AD. She is very fearful of falling and stated that she constantly feels unsteady. Reported numbness and tingling down L leg due to L4-S1 spinal fusion. Has very little social support and cried during evaluation.     Activities of Daily Living  Social history was obtained from Patient.    General Prior Level of Function Comments: full ability to perform all ADL's and tasks through day  General Current Level of Function Comments: limited ability to perform functional daily activities independently without pain  Patient Responsibilities: Personal ADL, Community mobility, Laundry        Pain     Patient reports a current pain level of 3/10. Pain at best is reported as 3/10. Pain at worst is reported as 9/10.   Location: Anterior R knee  Clinical Progression (since onset): Stable  Pain Qualities: Aching, Burning, Dull, Discomfort  Pain-Relieving Factors: Rest  Pain-Aggravating Factors: Bending, Movement, Lying down, Squatting, Stair climbing, Standing, Straightening           Past Medical History/Physical Systems Review:   Chanelle Downey  has a past medical history of Back pain, chronic, Biliary colic, Calculus of gallbladder without cholecystitis without obstruction, Cervical radiculopathy, Cervical stenosis of spinal canal, Degenerative cervical spinal stenosis, Depression, Flat back syndrome, postprocedural, and HSV (herpes simplex virus) anogenital infection.    Chanelle Downey  has a past surgical history that includes Eye surgery (); Tonsillectomy;  section; Appendectomy; Hysterectomy; Lumbar fusion (); Lumbar discectomy (); and Laparoscopic cholecystectomy (N/A, 2018).    Chanelle has a current medication list  which includes the following prescription(s): atorvastatin, bumetanide, ciclopirox, duloxetine, ergocalciferol, ergocalciferol (vitamin d2), gabapentin, hydrochlorothiazide, methadone hcl, methocarbamol, omeprazole, teriparatide, valacyclovir, vitamin d, and zinc.    Review of patient's allergies indicates:  No Known Allergies     Objective      Lower Extremity Sensation  General Lumbar/Lower Extremity Sensation  Intact: Right  Impaired: Left  Right Lumbar/Lower Extremity Sensation  Intact: Light Touch, Sharp/Dull Discrimination, Static Two Point Discrimination, Dynamic Two Point Discrimination, Kinesthesia, and Proprioception  Right Lumbar/Lower Extremity Sensation Stocking Glove Pattern: No    Left Lumbar/Lower Extremity Sensation  Diminished: Light Touch  Left Lumbar/Lower Extremity Sensation Light Touch Comment: Light touch impaired from L2-S1             Right Lower Extremity Reflexes  Patellar, L4: Brisk (3+)         Achilles, S1: Normal (2+)         Left Lower Extremity Reflexes  Patellar, L4: Trace (1+)          Achilles, S1: Normal (2+)              Knee Range of Motion   Right Knee   Active (deg) Passive (deg) Pain   Flexion 35 47     Extension -5           Left Knee   Active (deg) Passive (deg) Pain   Flexion 68 84     Extension -14         Dynamometry performed with pt sitting edge of mat with knee flexed to 60 degrees. Pt kicks into gait belt that is secured to the leg of the table. A towel was placed between pt's ankle and the gait belt to provide cushioned surface to kick into. A dynamometer set to measure in pounds was placed between the gait belt and the table with the flat end of the dynamometer pressing into the leg of the table.     Trial Number Right LE Left LE Limb Symmetry % Difference    Trial 1 6.6 5.1    Trial 2 3.0 7.6    Trial 3 5.7 8.7    Avg of Trials 5.1 7.1                     Hip Strength - Planes of Motion   Right Strength Right Pain Left Strength Left  Pain   Flexion (L2) 3-   3-      Extension           ABduction           ADduction           Internal Rotation           External Rotation                      Fall Risk  Functional mobility test results suggest the patient is: At Risk for Falls  Timed Up & Go (TUG)  Time: 26 seconds     An older adult who takes >=12 seconds to complete the TUG is at risk for falling.       Sit to Stand Testing  The patient completed 5 sit to stand transfers in 13 sec. pt performed 2 reps in 13 seconds before test was stopped prematurely due to pt pain                  Treatment:  Therapeutic Exercise  TE 1: quad set  TE 2: long arc quad  TE 3: heel slide      Time Entry(in minutes):  PT Evaluation (Low) Time Entry: 50  Therapeutic Exercise Time Entry: 10    Assessment & Plan   Assessment  Chanelle presents with a condition of Low complexity.   Presentation of Symptoms: Stable  Will Comorbidities Impact Care: Yes  Pt has impaired light touch of LLE secondary to spinal fusion of L4-S1    Functional Limitations: Activity tolerance, Ambulating on uneven surfaces, Completing work/school activities, Decreased ambulation distance/endurance, Functional mobility, Increased risk of fall, Maintaining balance, Participating in sports  Impairments: Impaired balance, Impaired physical strength, Pain with functional activity, Abnormal gait  Personal Factors Affecting Prognosis: Fear/anxiety    Prognosis: Fair  Assessment Details: Pt is a 65 year old female presenting to PT with ROM and strength deficits in the R knee. These deficits have negatively affected the patient's functional mobility and independence with ADLs. Pt is considered a fall risk as seen with her TUG score of 26s with AD and 5x STS test of 13s (only 2 repetitions). Her risk of falls has increased her anxiety and decreased her perceived ability in herself. Skilled PT intervention is recommended to help the pt regain functional mobility and independence with ADLs prior to knee surgery     Plan  From a physical  therapy perspective, the patient would benefit from: Skilled Rehab Services    Planned therapy interventions include: Therapeutic exercise, Therapeutic activities, Neuromuscular re-education, and Manual therapy.    Planned modalities to include: Biofeedback.        Visit Frequency: 2 times Per Week for 8 Weeks.       This plan was discussed with Patient.   Discussion participants: Agreed Upon Plan of Care  Plan details: Decrease frequency if patient shows significant improvements.          The patient's spiritual, cultural, and educational needs were considered, and the patient is agreeable to the plan of care and goals.     Education  Education was done with Patient. The patient's learning style includes Demonstration. The patient Demonstrates understanding.         HEP, POC, Details about pathophysiology including tissue healing, and prognosis of condition       Goals:         Ren Hull, PT    This treatment was co-treated with a student physical therapist with his signature below. The physical therapist was present throughout the entire treatment.     Ren Hardy, SPT

## 2025-07-16 ENCOUNTER — CLINICAL SUPPORT (OUTPATIENT)
Dept: REHABILITATION | Facility: OTHER | Age: 65
End: 2025-07-16
Payer: COMMERCIAL

## 2025-07-16 DIAGNOSIS — Z74.09 DECREASED FUNCTIONAL MOBILITY AND ENDURANCE: Primary | ICD-10-CM

## 2025-07-16 PROCEDURE — 97110 THERAPEUTIC EXERCISES: CPT | Mod: PN

## 2025-07-16 PROCEDURE — 97112 NEUROMUSCULAR REEDUCATION: CPT | Mod: PN

## 2025-07-16 PROCEDURE — 97140 MANUAL THERAPY 1/> REGIONS: CPT | Mod: PN

## 2025-07-16 NOTE — PROGRESS NOTES
Outpatient Rehab    Physical Therapy Visit    Patient Name: Chanelle Downey  MRN: [id number]  YOB: 1960  Encounter Date: 7/16/2025    Therapy Diagnosis:   Encounter Diagnosis   Name Primary?    Decreased functional mobility and endurance Yes     Physician: Yonatan Mccarthy MD    Physician Orders: Eval and Treat  Medical Diagnosis: Unilateral primary osteoarthritis, right knee  Surgical Diagnosis: Not applicable for this Episode   Surgical Date: Not applicable for this Episode  Days Since Last Surgery: Not applicable for this Episode    Visit # / Visits Authorized:  1 / 12  Insurance Authorization Period: 6/30/2025 to 12/31/2025  Date of Evaluation: 6/30/2025  Plan of Care Certification: 6/30/2025 to 9/30/2025      PT/PTA:     Number of PTA visits since last PT visit:   Time In: 0800   Time Out: 0900  Total Time (in minutes): 60   Total Billable Time (in minutes): 60    FOTO:  Intake Score: Not applicable for this Episode%  Survey Score 2: Not applicable for this Episode%  Survey Score 3: Not applicable for this Episode%    Precautions:         Subjective   Came in with pain and stiffness. Pt said she was in a bad head space today.  Pain reported as 6/10. R knee    Objective            Treatment:  Therapeutic Exercise  TE 1: heel slide x20  TE 2: pin-it stretch  TE 3: leg extension prop up 3 min  Manual Therapy  MT 1: superior patella glides  MT 2: medial patella glides  MT 3: fat pad mobilization  MT 4: inferior patella glide  MT 5: passive knee flexion  Balance/Neuromuscular Re-Education  NMR 1: quad set 2x10 3 second hold  NMR 2: TKE RTB  NMR 3: short arc quad 2x10  NMR 4: Seated SLR w/ strap 2x10    Time Entry(in minutes):  Manual Therapy Time Entry: 20  Neuromuscular Re-Education Time Entry: 25  Therapeutic Exercise Time Entry: 15    Assessment & Plan   Assessment: Pt presented to PT with high irritability levels today. Pt came in with limited knee flexion and extension that improved with  manual therapy and exercise. Inferior patella glides and passive knee flexion were performed followed up by active heel slides. Knee flexion ROM was then taken directly after heel slides, measurement recorded as 56 degrees.Irritability slightly decreased by end of treatment.  Pt also has difficulty with quad activation exercises.        The patient will continue to benefit from skilled outpatient physical therapy in order to address the deficits listed in the problem list on the initial evaluation, provide patient and family education, and maximize the patients level of independence in the home and community environments.     The patient's spiritual, cultural, and educational needs were considered, and the patient is agreeable to the plan of care and goals.           Plan: Improve quad strength and continue to increase active knee extension    Goals:   Active       LTGs       1.Report decreased knee pain < / = 4/10  to increase tolerance for stairs function with every day tasks.         Start:  07/16/25    Expected End:  09/10/25            2.Patient goal: Be able to ascend and descend stairs without pain and knee giving out         Start:  07/16/25    Expected End:  09/10/25            3.Increase strength to >/= 4+/5 in knee ext  to increase tolerance for ADL and work activities.        Start:  07/16/25    Expected End:  09/10/25            4. Pt will report at CJ level (20-40% impaired) on FOTO knee to demonstrate increase in LE        Start:  07/16/25    Expected End:  09/10/25              Hollis Murphy, PT, DPT, SCS  Board Certified Sports Certified Specialist       This treatment was co-treated with a student physical therapist with his signature below. The physical therapist was present throughout the entire treatment.     Ren Hardy, SPT

## 2025-07-29 ENCOUNTER — PATIENT MESSAGE (OUTPATIENT)
Dept: REHABILITATION | Facility: OTHER | Age: 65
End: 2025-07-29
Payer: COMMERCIAL

## 2025-08-04 DIAGNOSIS — A60.09 HERPES GENITALIS IN WOMEN: ICD-10-CM

## 2025-08-04 RX ORDER — VALACYCLOVIR HYDROCHLORIDE 500 MG/1
TABLET, FILM COATED ORAL
Qty: 90 TABLET | Refills: 0 | Status: SHIPPED | OUTPATIENT
Start: 2025-08-04

## 2025-08-04 NOTE — TELEPHONE ENCOUNTER
No care due was identified.  [facility] Embedded Care Due Messages. Reference number: [id number].   8/04/2025 10:06:57 AM CDT

## 2025-08-05 NOTE — TELEPHONE ENCOUNTER
Refill Decision Note   Chanelle Downey  is requesting a refill authorization.  Brief Assessment and Rationale for Refill:  Approve     Medication Therapy Plan:        Comments:     Note composed:11:09 PM 08/04/2025

## 2025-08-06 ENCOUNTER — TELEPHONE (OUTPATIENT)
Dept: ORTHOPEDICS | Facility: CLINIC | Age: 65
End: 2025-08-06
Payer: COMMERCIAL

## 2025-08-06 NOTE — TELEPHONE ENCOUNTER
Called pt and confirmed that we received the paperwork and sent it to disability.     Copied from CRM #[id number]. Topic: Appointments - Appointment Access  >> Aug 6, 2025  9:23 AM Lyudmila wrote:    Patient Returning Call        Who Called:pt  Does the patient know what this is regarding? Need nurse to call asking if office received paperwork that was faxed over last week  Would the patient rather a call back or a response via [facility]? call  Best Call Back Number:[phone]  Additional Information: call back or send message thru portal

## 2025-08-07 DIAGNOSIS — M17.11 PRIMARY OSTEOARTHRITIS OF RIGHT KNEE: Primary | ICD-10-CM

## 2025-08-11 ENCOUNTER — TELEPHONE (OUTPATIENT)
Dept: ORTHOPEDICS | Facility: CLINIC | Age: 65
End: 2025-08-11
Payer: COMMERCIAL

## 2025-08-15 ENCOUNTER — OFFICE VISIT (OUTPATIENT)
Dept: INTERNAL MEDICINE | Facility: CLINIC | Age: 65
End: 2025-08-15
Payer: COMMERCIAL

## 2025-08-15 VITALS
HEIGHT: 62 IN | WEIGHT: 194.44 LBS | SYSTOLIC BLOOD PRESSURE: 114 MMHG | OXYGEN SATURATION: 99 % | DIASTOLIC BLOOD PRESSURE: 70 MMHG | BODY MASS INDEX: 35.78 KG/M2 | HEART RATE: 81 BPM

## 2025-08-15 DIAGNOSIS — Z12.12 SCREENING FOR COLORECTAL CANCER: ICD-10-CM

## 2025-08-15 DIAGNOSIS — E78.2 MIXED HYPERLIPIDEMIA: ICD-10-CM

## 2025-08-15 DIAGNOSIS — Z01.818 PREOP EXAMINATION: Primary | ICD-10-CM

## 2025-08-15 DIAGNOSIS — Z12.11 SCREENING FOR COLORECTAL CANCER: ICD-10-CM

## 2025-08-15 DIAGNOSIS — F17.210 CIGARETTE NICOTINE DEPENDENCE WITHOUT COMPLICATION: ICD-10-CM

## 2025-08-15 DIAGNOSIS — I10 ESSENTIAL HYPERTENSION: ICD-10-CM

## 2025-08-15 DIAGNOSIS — M17.11 PRIMARY OSTEOARTHRITIS OF RIGHT KNEE: ICD-10-CM

## 2025-08-15 DIAGNOSIS — F11.20 METHADONE DEPENDENCE: ICD-10-CM

## 2025-08-15 PROCEDURE — 3074F SYST BP LT 130 MM HG: CPT | Mod: CPTII,S$GLB,, | Performed by: STUDENT IN AN ORGANIZED HEALTH CARE EDUCATION/TRAINING PROGRAM

## 2025-08-15 PROCEDURE — 3044F HG A1C LEVEL LT 7.0%: CPT | Mod: CPTII,S$GLB,, | Performed by: STUDENT IN AN ORGANIZED HEALTH CARE EDUCATION/TRAINING PROGRAM

## 2025-08-15 PROCEDURE — 1160F RVW MEDS BY RX/DR IN RCRD: CPT | Mod: CPTII,S$GLB,, | Performed by: STUDENT IN AN ORGANIZED HEALTH CARE EDUCATION/TRAINING PROGRAM

## 2025-08-15 PROCEDURE — 3008F BODY MASS INDEX DOCD: CPT | Mod: CPTII,S$GLB,, | Performed by: STUDENT IN AN ORGANIZED HEALTH CARE EDUCATION/TRAINING PROGRAM

## 2025-08-15 PROCEDURE — 3078F DIAST BP <80 MM HG: CPT | Mod: CPTII,S$GLB,, | Performed by: STUDENT IN AN ORGANIZED HEALTH CARE EDUCATION/TRAINING PROGRAM

## 2025-08-15 PROCEDURE — 1159F MED LIST DOCD IN RCRD: CPT | Mod: CPTII,S$GLB,, | Performed by: STUDENT IN AN ORGANIZED HEALTH CARE EDUCATION/TRAINING PROGRAM

## 2025-08-15 PROCEDURE — 99214 OFFICE O/P EST MOD 30 MIN: CPT | Mod: S$GLB,,, | Performed by: STUDENT IN AN ORGANIZED HEALTH CARE EDUCATION/TRAINING PROGRAM

## 2025-08-15 PROCEDURE — 99999 PR PBB SHADOW E&M-EST. PATIENT-LVL III: CPT | Mod: PBBFAC,,, | Performed by: STUDENT IN AN ORGANIZED HEALTH CARE EDUCATION/TRAINING PROGRAM

## 2025-08-26 ENCOUNTER — TELEPHONE (OUTPATIENT)
Dept: ORTHOPEDICS | Facility: CLINIC | Age: 65
End: 2025-08-26

## 2025-08-26 ENCOUNTER — OFFICE VISIT (OUTPATIENT)
Facility: CLINIC | Age: 65
End: 2025-08-26
Payer: COMMERCIAL

## 2025-08-26 ENCOUNTER — OFFICE VISIT (OUTPATIENT)
Dept: ORTHOPEDICS | Facility: CLINIC | Age: 65
End: 2025-08-26
Payer: COMMERCIAL

## 2025-08-26 ENCOUNTER — HOSPITAL ENCOUNTER (OUTPATIENT)
Dept: RADIOLOGY | Facility: HOSPITAL | Age: 65
Discharge: HOME | End: 2025-08-26
Attending: ORTHOPAEDIC SURGERY
Payer: COMMERCIAL

## 2025-08-26 VITALS
OXYGEN SATURATION: 96 % | DIASTOLIC BLOOD PRESSURE: 58 MMHG | WEIGHT: 195.63 LBS | HEIGHT: 62 IN | RESPIRATION RATE: 14 BRPM | HEART RATE: 82 BPM | BODY MASS INDEX: 36 KG/M2 | SYSTOLIC BLOOD PRESSURE: 128 MMHG | TEMPERATURE: 98 F

## 2025-08-26 DIAGNOSIS — M25.551 BILATERAL HIP PAIN: Primary | ICD-10-CM

## 2025-08-26 DIAGNOSIS — E55.9 VITAMIN D INSUFFICIENCY: ICD-10-CM

## 2025-08-26 DIAGNOSIS — K21.9 GASTROESOPHAGEAL REFLUX DISEASE WITHOUT ESOPHAGITIS: ICD-10-CM

## 2025-08-26 DIAGNOSIS — I83.93 VARICOSE VEINS OF BOTH LOWER EXTREMITIES, UNSPECIFIED WHETHER COMPLICATED: ICD-10-CM

## 2025-08-26 DIAGNOSIS — R26.81 GAIT INSTABILITY: ICD-10-CM

## 2025-08-26 DIAGNOSIS — R20.2 PARESTHESIA OF BOTH LOWER EXTREMITIES: ICD-10-CM

## 2025-08-26 DIAGNOSIS — R00.2 PALPITATIONS: ICD-10-CM

## 2025-08-26 DIAGNOSIS — M25.552 BILATERAL HIP PAIN: Primary | ICD-10-CM

## 2025-08-26 DIAGNOSIS — Z72.0 TOBACCO ABUSE: ICD-10-CM

## 2025-08-26 DIAGNOSIS — A60.09 HERPES GENITALIS IN WOMEN: ICD-10-CM

## 2025-08-26 DIAGNOSIS — D64.9 ANEMIA, UNSPECIFIED TYPE: ICD-10-CM

## 2025-08-26 DIAGNOSIS — M17.11 PRIMARY OSTEOARTHRITIS OF RIGHT KNEE: Primary | ICD-10-CM

## 2025-08-26 DIAGNOSIS — I10 ESSENTIAL HYPERTENSION: ICD-10-CM

## 2025-08-26 DIAGNOSIS — R11.0 NAUSEA: ICD-10-CM

## 2025-08-26 DIAGNOSIS — E78.2 MIXED HYPERLIPIDEMIA: ICD-10-CM

## 2025-08-26 DIAGNOSIS — M17.11 PRIMARY OSTEOARTHRITIS OF RIGHT KNEE: ICD-10-CM

## 2025-08-26 DIAGNOSIS — M81.8 OTHER OSTEOPOROSIS WITHOUT CURRENT PATHOLOGICAL FRACTURE: ICD-10-CM

## 2025-08-26 DIAGNOSIS — G89.4 CHRONIC PAIN SYNDROME: Primary | ICD-10-CM

## 2025-08-26 DIAGNOSIS — E16.2 HYPOGLYCEMIA: ICD-10-CM

## 2025-08-26 PROCEDURE — 99214 OFFICE O/P EST MOD 30 MIN: CPT | Mod: S$GLB,,,

## 2025-08-26 PROCEDURE — 99215 OFFICE O/P EST HI 40 MIN: CPT | Mod: S$GLB,,, | Performed by: HOSPITALIST

## 2025-08-26 PROCEDURE — 73560 X-RAY EXAM OF KNEE 1 OR 2: CPT | Mod: TC,RT

## 2025-08-26 PROCEDURE — 1160F RVW MEDS BY RX/DR IN RCRD: CPT | Mod: CPTII,S$GLB,, | Performed by: HOSPITALIST

## 2025-08-26 PROCEDURE — 1159F MED LIST DOCD IN RCRD: CPT | Mod: CPTII,S$GLB,,

## 2025-08-26 PROCEDURE — 99999 PR PBB SHADOW E&M-EST. PATIENT-LVL II: CPT | Mod: PBBFAC,,, | Performed by: HOSPITALIST

## 2025-08-26 PROCEDURE — 3044F HG A1C LEVEL LT 7.0%: CPT | Mod: CPTII,S$GLB,, | Performed by: HOSPITALIST

## 2025-08-26 PROCEDURE — 1160F RVW MEDS BY RX/DR IN RCRD: CPT | Mod: CPTII,S$GLB,,

## 2025-08-26 PROCEDURE — 3044F HG A1C LEVEL LT 7.0%: CPT | Mod: CPTII,S$GLB,,

## 2025-08-26 PROCEDURE — 99999 PR PBB SHADOW E&M-EST. PATIENT-LVL III: CPT | Mod: PBBFAC,,,

## 2025-08-26 PROCEDURE — 1159F MED LIST DOCD IN RCRD: CPT | Mod: CPTII,S$GLB,, | Performed by: HOSPITALIST

## 2025-08-26 PROCEDURE — 73560 X-RAY EXAM OF KNEE 1 OR 2: CPT | Mod: 26,RT,, | Performed by: RADIOLOGY

## 2025-08-26 RX ORDER — CELECOXIB 200 MG/1
400 CAPSULE ORAL ONCE
OUTPATIENT
Start: 2025-08-26 | End: 2025-08-26

## 2025-08-26 RX ORDER — OXYCODONE HYDROCHLORIDE 5 MG/1
10 TABLET ORAL
Refills: 0 | OUTPATIENT
Start: 2025-08-26

## 2025-08-26 RX ORDER — FAMOTIDINE 20 MG/1
20 TABLET, FILM COATED ORAL 2 TIMES DAILY
OUTPATIENT
Start: 2025-08-26

## 2025-08-26 RX ORDER — BISACODYL 10 MG/1
10 SUPPOSITORY RECTAL EVERY 12 HOURS PRN
OUTPATIENT
Start: 2025-08-26

## 2025-08-26 RX ORDER — AMOXICILLIN 250 MG
1 CAPSULE ORAL 2 TIMES DAILY
OUTPATIENT
Start: 2025-08-26

## 2025-08-26 RX ORDER — PROCHLORPERAZINE EDISYLATE 5 MG/ML
5 INJECTION INTRAMUSCULAR; INTRAVENOUS EVERY 6 HOURS PRN
OUTPATIENT
Start: 2025-08-26

## 2025-08-26 RX ORDER — PREGABALIN 75 MG/1
75 CAPSULE ORAL NIGHTLY
OUTPATIENT
Start: 2025-08-26

## 2025-08-26 RX ORDER — ONDANSETRON HYDROCHLORIDE 2 MG/ML
4 INJECTION, SOLUTION INTRAVENOUS EVERY 8 HOURS PRN
OUTPATIENT
Start: 2025-08-26

## 2025-08-26 RX ORDER — ASPIRIN 81 MG/1
81 TABLET ORAL 2 TIMES DAILY
OUTPATIENT
Start: 2025-08-26

## 2025-08-26 RX ORDER — ACETAMINOPHEN 500 MG
1000 TABLET ORAL EVERY 6 HOURS
OUTPATIENT
Start: 2025-08-26

## 2025-08-26 RX ORDER — SODIUM CHLORIDE 9 MG/ML
INJECTION, SOLUTION INTRAVENOUS CONTINUOUS
OUTPATIENT
Start: 2025-08-26 | End: 2025-08-27

## 2025-08-26 RX ORDER — POLYETHYLENE GLYCOL 3350 17 G/17G
17 POWDER, FOR SOLUTION ORAL DAILY
OUTPATIENT
Start: 2025-08-26

## 2025-08-26 RX ORDER — PREGABALIN 75 MG/1
75 CAPSULE ORAL
OUTPATIENT
Start: 2025-08-26

## 2025-08-26 RX ORDER — FENTANYL CITRATE 50 UG/ML
100 INJECTION, SOLUTION INTRAMUSCULAR; INTRAVENOUS
Refills: 0 | OUTPATIENT
Start: 2025-08-26 | End: 2025-08-27

## 2025-08-26 RX ORDER — ACETAMINOPHEN 500 MG
1000 TABLET ORAL
OUTPATIENT
Start: 2025-08-26

## 2025-08-26 RX ORDER — METHOCARBAMOL 750 MG/1
750 TABLET, FILM COATED ORAL 3 TIMES DAILY
OUTPATIENT
Start: 2025-08-26

## 2025-08-26 RX ORDER — OXYCODONE HYDROCHLORIDE 5 MG/1
5 TABLET ORAL
Refills: 0 | OUTPATIENT
Start: 2025-08-26

## 2025-08-26 RX ORDER — MUPIROCIN 20 MG/G
1 OINTMENT TOPICAL
OUTPATIENT
Start: 2025-08-26

## 2025-08-26 RX ORDER — MIDAZOLAM HYDROCHLORIDE 1 MG/ML
4 INJECTION, SOLUTION INTRAMUSCULAR; INTRAVENOUS
OUTPATIENT
Start: 2025-08-26 | End: 2025-08-27

## 2025-08-26 RX ORDER — LIDOCAINE HYDROCHLORIDE 10 MG/ML
1 INJECTION, SOLUTION EPIDURAL; INFILTRATION; INTRACAUDAL; PERINEURAL
OUTPATIENT
Start: 2025-08-26

## 2025-08-26 RX ORDER — NALOXONE HCL 0.4 MG/ML
0.02 VIAL (ML) INJECTION
OUTPATIENT
Start: 2025-08-26

## 2025-08-26 RX ORDER — SODIUM CHLORIDE 9 MG/ML
INJECTION, SOLUTION INTRAVENOUS
OUTPATIENT
Start: 2025-08-26

## 2025-08-26 RX ORDER — SODIUM CHLORIDE 0.9 % (FLUSH) 0.9 %
10 SYRINGE (ML) INJECTION
OUTPATIENT
Start: 2025-08-26

## 2025-08-27 ENCOUNTER — HOSPITAL ENCOUNTER (OUTPATIENT)
Dept: CARDIOLOGY | Facility: CLINIC | Age: 65
Discharge: HOME | End: 2025-08-27
Payer: COMMERCIAL

## 2025-08-27 ENCOUNTER — CLINICAL SUPPORT (OUTPATIENT)
Dept: REHABILITATION | Facility: OTHER | Age: 65
End: 2025-08-27
Attending: ORTHOPAEDIC SURGERY
Payer: COMMERCIAL

## 2025-08-27 ENCOUNTER — TELEPHONE (OUTPATIENT)
Facility: CLINIC | Age: 65
End: 2025-08-27
Payer: COMMERCIAL

## 2025-08-27 ENCOUNTER — TELEPHONE (OUTPATIENT)
Dept: ORTHOPEDICS | Facility: CLINIC | Age: 65
End: 2025-08-27
Payer: COMMERCIAL

## 2025-08-27 ENCOUNTER — LAB VISIT (OUTPATIENT)
Dept: LAB | Facility: HOSPITAL | Age: 65
End: 2025-08-27
Attending: ANESTHESIOLOGY
Payer: COMMERCIAL

## 2025-08-27 DIAGNOSIS — M79.609 PAIN IN EXTREMITY, UNSPECIFIED EXTREMITY: ICD-10-CM

## 2025-08-27 DIAGNOSIS — M25.561 CHRONIC PAIN OF RIGHT KNEE: Primary | ICD-10-CM

## 2025-08-27 DIAGNOSIS — Z01.818 PRE-OP TESTING: ICD-10-CM

## 2025-08-27 DIAGNOSIS — R53.1 DECREASED STRENGTH, ENDURANCE, AND MOBILITY: ICD-10-CM

## 2025-08-27 DIAGNOSIS — G89.29 CHRONIC PAIN OF RIGHT KNEE: Primary | ICD-10-CM

## 2025-08-27 DIAGNOSIS — R68.89 DECREASED STRENGTH, ENDURANCE, AND MOBILITY: ICD-10-CM

## 2025-08-27 DIAGNOSIS — Z74.09 DECREASED STRENGTH, ENDURANCE, AND MOBILITY: ICD-10-CM

## 2025-08-27 LAB
ABSOLUTE EOSINOPHIL (OHS): 0.25 K/UL
ABSOLUTE MONOCYTE (OHS): 0.46 K/UL (ref 0.3–1)
ABSOLUTE NEUTROPHIL COUNT (OHS): 3.37 K/UL (ref 1.8–7.7)
ALBUMIN SERPL BCP-MCNC: 3.7 G/DL (ref 3.5–5.2)
ALP SERPL-CCNC: 92 UNIT/L (ref 40–150)
ALT SERPL W/O P-5'-P-CCNC: 18 UNIT/L (ref 0–55)
ANION GAP (OHS): 8 MMOL/L (ref 8–16)
AST SERPL-CCNC: 20 UNIT/L (ref 0–50)
BASOPHILS # BLD AUTO: 0.05 K/UL
BASOPHILS NFR BLD AUTO: 0.8 %
BILIRUB SERPL-MCNC: 0.3 MG/DL (ref 0.1–1)
BUN SERPL-MCNC: 14 MG/DL (ref 8–23)
CALCIUM SERPL-MCNC: 9.4 MG/DL (ref 8.7–10.5)
CHLORIDE SERPL-SCNC: 100 MMOL/L (ref 95–110)
CO2 SERPL-SCNC: 29 MMOL/L (ref 23–29)
CREAT SERPL-MCNC: 0.6 MG/DL (ref 0.5–1.4)
ERYTHROCYTE [DISTWIDTH] IN BLOOD BY AUTOMATED COUNT: 14.2 % (ref 11.5–14.5)
GFR SERPLBLD CREATININE-BSD FMLA CKD-EPI: >60 ML/MIN/1.73/M2
GLUCOSE SERPL-MCNC: 90 MG/DL (ref 70–110)
HCT VFR BLD AUTO: 37.2 % (ref 37–48.5)
HGB BLD-MCNC: 12.2 GM/DL (ref 12–16)
IMM GRANULOCYTES # BLD AUTO: 0.01 K/UL (ref 0–0.04)
IMM GRANULOCYTES NFR BLD AUTO: 0.2 % (ref 0–0.5)
INR PPP: 1 (ref 0.8–1.2)
LYMPHOCYTES # BLD AUTO: 2.41 K/UL (ref 1–4.8)
MCH RBC QN AUTO: 31.1 PG (ref 27–31)
MCHC RBC AUTO-ENTMCNC: 32.8 G/DL (ref 32–36)
MCV RBC AUTO: 95 FL (ref 82–98)
NUCLEATED RBC (/100WBC) (OHS): 0 /100 WBC
OHS QRS DURATION: 88 MS
OHS QTC CALCULATION: 446 MS
PLATELET # BLD AUTO: 228 K/UL (ref 150–450)
PMV BLD AUTO: 13.3 FL (ref 9.2–12.9)
POTASSIUM SERPL-SCNC: 3.8 MMOL/L (ref 3.5–5.1)
PROT SERPL-MCNC: 7 GM/DL (ref 6–8.4)
PROTHROMBIN TIME: 11 SECONDS (ref 9–12.5)
RBC # BLD AUTO: 3.92 M/UL (ref 4–5.4)
RELATIVE EOSINOPHIL (OHS): 3.8 %
RELATIVE LYMPHOCYTE (OHS): 36.8 % (ref 18–48)
RELATIVE MONOCYTE (OHS): 7 % (ref 4–15)
RELATIVE NEUTROPHIL (OHS): 51.4 % (ref 38–73)
SODIUM SERPL-SCNC: 137 MMOL/L (ref 136–145)
WBC # BLD AUTO: 6.55 K/UL (ref 3.9–12.7)

## 2025-08-27 PROCEDURE — 97530 THERAPEUTIC ACTIVITIES: CPT | Mod: PN

## 2025-08-27 PROCEDURE — 36415 COLL VENOUS BLD VENIPUNCTURE: CPT

## 2025-08-27 PROCEDURE — 93010 ELECTROCARDIOGRAM REPORT: CPT | Mod: S$GLB,,, | Performed by: STUDENT IN AN ORGANIZED HEALTH CARE EDUCATION/TRAINING PROGRAM

## 2025-08-27 PROCEDURE — 85610 PROTHROMBIN TIME: CPT

## 2025-08-27 PROCEDURE — 85025 COMPLETE CBC W/AUTO DIFF WBC: CPT

## 2025-08-27 PROCEDURE — 97163 PT EVAL HIGH COMPLEX 45 MIN: CPT | Mod: PN

## 2025-08-27 PROCEDURE — 82040 ASSAY OF SERUM ALBUMIN: CPT

## 2025-09-01 PROBLEM — R53.1 DECREASED STRENGTH, ENDURANCE, AND MOBILITY: Status: RESOLVED | Noted: 2025-08-27 | Resolved: 2025-09-01

## 2025-09-01 PROBLEM — R68.89 DECREASED STRENGTH, ENDURANCE, AND MOBILITY: Status: RESOLVED | Noted: 2025-08-27 | Resolved: 2025-09-01

## 2025-09-01 PROBLEM — Z74.09 DECREASED STRENGTH, ENDURANCE, AND MOBILITY: Status: RESOLVED | Noted: 2025-08-27 | Resolved: 2025-09-01

## 2025-09-04 ENCOUNTER — TELEPHONE (OUTPATIENT)
Dept: ORTHOPEDICS | Facility: CLINIC | Age: 65
End: 2025-09-04
Payer: COMMERCIAL

## (undated) DEVICE — WARMER DRAPE STERILE LF

## (undated) DEVICE — CLIP HEMO-LOK ML

## (undated) DEVICE — CLOSURE SKIN STERI STRIP 1/4X3

## (undated) DEVICE — SPONGE DERMA 8PLY 2X2

## (undated) DEVICE — BLADE SURG CARBON STEEL SZ11

## (undated) DEVICE — TUBING HF INSUFFLATION W/ FLTR

## (undated) DEVICE — ELECTRODE REM PLYHSV RETURN 9

## (undated) DEVICE — SUT VICRYL PLUS 4-0 P3 18IN

## (undated) DEVICE — SCISSOR 5MMX35CM DIRECT DRIVE

## (undated) DEVICE — IRRIGATOR ENDOSCOPY DISP.

## (undated) DEVICE — DRAPE ABDOMINAL TIBURON 14X11

## (undated) DEVICE — TRAY MINOR GEN SURG

## (undated) DEVICE — KIT ANTIFOG

## (undated) DEVICE — SOL NS 1000CC

## (undated) DEVICE — DRESSING TRANS 2X2 TEGADERM

## (undated) DEVICE — NDL HYPO REG 25G X 1 1/2

## (undated) DEVICE — SUT 0 VICRYL / UR6 (J603)

## (undated) DEVICE — TROCAR ENDOPATH EXCEL DILATING

## (undated) DEVICE — TROCAR ENDOPATH XCEL 5MM 7.5CM

## (undated) DEVICE — APPLIER CLIP ENDO LIGAMAX 5MM

## (undated) DEVICE — TROCAR ENDOPATH EXCEL

## (undated) DEVICE — TROCAR ENDOPATH XCEL 12X100MM